# Patient Record
Sex: MALE | Race: WHITE | NOT HISPANIC OR LATINO | Employment: OTHER | URBAN - NONMETROPOLITAN AREA
[De-identification: names, ages, dates, MRNs, and addresses within clinical notes are randomized per-mention and may not be internally consistent; named-entity substitution may affect disease eponyms.]

---

## 2023-03-16 DIAGNOSIS — R91.8 MULTIPLE PULMONARY NODULES DETERMINED BY COMPUTED TOMOGRAPHY OF LUNG: Primary | ICD-10-CM

## 2023-03-16 DIAGNOSIS — J44.0 CHRONIC OBSTRUCTIVE PULMONARY DISEASE WITH ACUTE LOWER RESPIRATORY INFECTION (MULTI): Primary | ICD-10-CM

## 2023-03-16 PROBLEM — U07.1 COVID-19 VIRUS INFECTION: Status: RESOLVED | Noted: 2023-03-16 | Resolved: 2023-03-16

## 2023-03-16 PROBLEM — M79.10 MUSCLE PAIN: Status: RESOLVED | Noted: 2023-03-16 | Resolved: 2023-03-16

## 2023-03-16 PROBLEM — I25.10 CORONARY ARTERY DISEASE: Status: ACTIVE | Noted: 2023-03-16

## 2023-03-16 PROBLEM — G45.9 TRANSIENT ISCHEMIC ATTACK (TIA): Status: ACTIVE | Noted: 2023-03-16

## 2023-03-16 PROBLEM — S51.012A LACERATION OF LEFT ELBOW: Status: RESOLVED | Noted: 2023-03-16 | Resolved: 2023-03-16

## 2023-03-16 PROBLEM — J44.9 CHRONIC OBSTRUCTIVE PULMONARY DISEASE (MULTI): Status: ACTIVE | Noted: 2023-03-16

## 2023-03-16 RX ORDER — EZETIMIBE 10 MG/1
1 TABLET ORAL DAILY
COMMUNITY
Start: 2021-12-22

## 2023-03-16 RX ORDER — NITROGLYCERIN 0.4 MG/1
0.4 TABLET SUBLINGUAL EVERY 5 MIN PRN
COMMUNITY
Start: 2013-04-12

## 2023-03-16 RX ORDER — PREDNISONE 20 MG/1
40 TABLET ORAL DAILY
Qty: 10 TABLET | Refills: 0 | Status: SHIPPED | OUTPATIENT
Start: 2023-03-16 | End: 2023-03-21

## 2023-03-16 RX ORDER — ASPIRIN 81 MG/1
81 TABLET ORAL DAILY
COMMUNITY
Start: 2013-09-28

## 2023-03-16 RX ORDER — DOXYCYCLINE 100 MG/1
100 CAPSULE ORAL 2 TIMES DAILY
Qty: 20 CAPSULE | Refills: 0 | Status: SHIPPED | OUTPATIENT
Start: 2023-03-16 | End: 2023-03-26

## 2023-04-02 PROBLEM — E11.9 DIABETES MELLITUS, TYPE 2 (MULTI): Status: ACTIVE | Noted: 2023-04-02

## 2023-04-02 PROBLEM — R09.89 ABDOMINAL BRUIT: Status: ACTIVE | Noted: 2023-04-02

## 2023-04-02 PROBLEM — Z86.711 HISTORY OF PULMONARY EMBOLISM: Status: ACTIVE | Noted: 2023-04-02

## 2023-04-02 PROBLEM — K29.70 GASTRITIS: Status: ACTIVE | Noted: 2023-04-02

## 2023-04-02 PROBLEM — J40 BRONCHITIS: Status: ACTIVE | Noted: 2023-04-02

## 2023-04-02 PROBLEM — K21.9 GERD (GASTROESOPHAGEAL REFLUX DISEASE): Status: ACTIVE | Noted: 2023-04-02

## 2023-04-02 PROBLEM — M25.522 LEFT ELBOW PAIN: Status: ACTIVE | Noted: 2023-04-02

## 2023-04-02 PROBLEM — M25.552 HIP PAIN, LEFT: Status: ACTIVE | Noted: 2023-04-02

## 2023-04-02 PROBLEM — M13.0 POLYARTHRITIS: Status: ACTIVE | Noted: 2023-04-02

## 2023-04-02 PROBLEM — K22.70 BARRETT ESOPHAGUS: Status: ACTIVE | Noted: 2023-04-02

## 2023-04-02 PROBLEM — F17.200 NICOTINE DEPENDENCE: Status: ACTIVE | Noted: 2023-04-02

## 2023-04-02 PROBLEM — N52.9 ERECTILE DYSFUNCTION: Status: ACTIVE | Noted: 2023-04-02

## 2023-04-02 PROBLEM — E55.9 VITAMIN D DEFICIENCY: Status: ACTIVE | Noted: 2023-04-02

## 2023-04-02 PROBLEM — I10 HYPERTENSION: Status: ACTIVE | Noted: 2023-04-02

## 2023-04-02 PROBLEM — E78.5 HYPERLIPIDEMIA: Status: ACTIVE | Noted: 2023-04-02

## 2023-04-02 PROBLEM — J30.9 ALLERGIC RHINITIS: Status: ACTIVE | Noted: 2023-04-02

## 2023-04-02 RX ORDER — METOPROLOL SUCCINATE 25 MG/1
1 TABLET, EXTENDED RELEASE ORAL DAILY
COMMUNITY
Start: 2018-05-07

## 2023-04-02 RX ORDER — ROSUVASTATIN CALCIUM 20 MG/1
1 TABLET, COATED ORAL DAILY
COMMUNITY
Start: 2023-02-09

## 2023-04-03 ENCOUNTER — OFFICE VISIT (OUTPATIENT)
Dept: PRIMARY CARE | Facility: CLINIC | Age: 63
End: 2023-04-03
Payer: COMMERCIAL

## 2023-04-03 VITALS
DIASTOLIC BLOOD PRESSURE: 86 MMHG | BODY MASS INDEX: 28.31 KG/M2 | WEIGHT: 180.4 LBS | SYSTOLIC BLOOD PRESSURE: 122 MMHG | OXYGEN SATURATION: 96 % | HEART RATE: 65 BPM | HEIGHT: 67 IN

## 2023-04-03 DIAGNOSIS — R05.9 COUGH IN ADULT: ICD-10-CM

## 2023-04-03 DIAGNOSIS — R10.30 LOWER ABDOMINAL PAIN: ICD-10-CM

## 2023-04-03 DIAGNOSIS — J44.9 CHRONIC OBSTRUCTIVE PULMONARY DISEASE, UNSPECIFIED COPD TYPE (MULTI): Primary | ICD-10-CM

## 2023-04-03 PROCEDURE — 3074F SYST BP LT 130 MM HG: CPT | Performed by: FAMILY MEDICINE

## 2023-04-03 PROCEDURE — 3079F DIAST BP 80-89 MM HG: CPT | Performed by: FAMILY MEDICINE

## 2023-04-03 PROCEDURE — 99214 OFFICE O/P EST MOD 30 MIN: CPT | Performed by: FAMILY MEDICINE

## 2023-04-03 RX ORDER — LEVOFLOXACIN 500 MG/1
500 TABLET, FILM COATED ORAL DAILY
Qty: 10 TABLET | Refills: 0 | Status: SHIPPED | OUTPATIENT
Start: 2023-04-03 | End: 2023-04-13

## 2023-04-03 ASSESSMENT — ENCOUNTER SYMPTOMS: DEPRESSION: 0

## 2023-04-03 NOTE — PROGRESS NOTES
"Subjective   Patient ID: Jatinder Keenan is a 62 y.o. male who presents for Cough (Still has productive cough,also c/o across lower abdomen ).    HPI   Still coughing , some out at times clear whitish   Doxy and prednisone  did not help  Former heavy smoker  Had recent CT low-dose lung cancer screening which did show hazy area right lower lobe  Denies pain lung area  No fever chills    He does however have pain in abdomen  Lower mid abd pain   1yr ago belt buckle injury to lower abdomen in this area  Lifting object belt buckle came up sideways and went right in the abdomen had significant pain afterwards  Bms same   Does at times have some discomfort with full bladder  Otherwise significant change has not occurred regarding urinary function    Review of Systems    Objective   /86 (BP Location: Left arm, Patient Position: Sitting, BP Cuff Size: Large adult)   Pulse 65   Ht 1.689 m (5' 6.5\")   Wt 81.8 kg (180 lb 6.4 oz)   SpO2 96%   BMI 28.68 kg/m²     Physical Exam:  General: alert, no apparent distress, good hygiene   HEAD:  Normocephalic, atraumatic    EARS:  EAC patent, TMs normal,   EYES:  sclera white, YOBANI, conjunctiva noninjected  NOSE: Nasal passages patent   MOUTH: Pharynx clear, tongue uvula midline, grade 3 airway  Neck:  supple, no masses, thyroid non enlarged non nodular, no cervical adenopathy,  Lungs:  no wheezing, no rales , no rhonchi, normal respiratory pattern, breath sounds are  diminished  Heart:  regular rate and  rhythm, no murmur, no ectopy, no S3 or S4, no carotid bruits  Abdomen:  soft NT,BS + ,  no organomegaly, no masses, no bruits, area of pain is just suprapubic approximately area of 12 cm across lower abdomen midline.  Bladder does not feel enlarged no obvious hernia in this area or periumbilical  Extremities:  no edema, no cyanosis, no clubbing,  2+ posterior tibialis pulse      Assessment/Plan   Problem List Items Addressed This Visit       Chronic obstructive " pulmonary disease (CMS/HCC) - Primary    Relevant Medications    levoFLOXacin (Levaquin) 500 mg tablet     Other Visit Diagnoses       Lower abdominal pain        Relevant Orders    POCT UA (nonautomated) manually resulted    Cough in adult            Patient informed if cough does not improve he should have the CT low-dose lung follow-up approximately 1 month rather than 3  Former heavy smoker abnormalities on low-dose scan  Abdominal pain does not improved patient have UA possible further evaluation may need to consider CT scan.

## 2024-06-22 ENCOUNTER — OFFICE VISIT (OUTPATIENT)
Dept: PRIMARY CARE | Facility: CLINIC | Age: 64
End: 2024-06-22
Payer: COMMERCIAL

## 2024-06-22 VITALS
SYSTOLIC BLOOD PRESSURE: 117 MMHG | HEART RATE: 60 BPM | DIASTOLIC BLOOD PRESSURE: 74 MMHG | OXYGEN SATURATION: 96 % | BODY MASS INDEX: 26.71 KG/M2 | TEMPERATURE: 98.3 F | WEIGHT: 168 LBS

## 2024-06-22 DIAGNOSIS — K59.09 OTHER CONSTIPATION: Primary | ICD-10-CM

## 2024-06-22 PROBLEM — R73.9 HYPERGLYCEMIA: Status: ACTIVE | Noted: 2024-06-22

## 2024-06-22 PROBLEM — Z98.61 HISTORY OF PERCUTANEOUS CORONARY INTERVENTION: Status: ACTIVE | Noted: 2023-02-09

## 2024-06-22 PROBLEM — L23.7 CONTACT DERMATITIS DUE TO POISON IVY: Status: RESOLVED | Noted: 2024-06-22 | Resolved: 2024-06-22

## 2024-06-22 PROBLEM — R79.89 ABNORMAL LIVER FUNCTION TESTS: Status: ACTIVE | Noted: 2024-06-22

## 2024-06-22 PROBLEM — G81.94 LEFT HEMIPARESIS (MULTI): Status: ACTIVE | Noted: 2024-06-22

## 2024-06-22 PROBLEM — I25.2 HISTORY OF MYOCARDIAL INFARCTION: Status: ACTIVE | Noted: 2024-06-22

## 2024-06-22 PROCEDURE — 99213 OFFICE O/P EST LOW 20 MIN: CPT | Performed by: FAMILY MEDICINE

## 2024-06-22 PROCEDURE — 3078F DIAST BP <80 MM HG: CPT | Performed by: FAMILY MEDICINE

## 2024-06-22 PROCEDURE — 3074F SYST BP LT 130 MM HG: CPT | Performed by: FAMILY MEDICINE

## 2024-06-22 RX ORDER — SYRING-NEEDL,DISP,INSUL,0.3 ML 29 G X1/2"
296 SYRINGE, EMPTY DISPOSABLE MISCELLANEOUS ONCE
Qty: 296 ML | Refills: 0 | Status: ON HOLD | OUTPATIENT
Start: 2024-06-22 | End: 2024-06-22

## 2024-06-22 ASSESSMENT — PATIENT HEALTH QUESTIONNAIRE - PHQ9
1. LITTLE INTEREST OR PLEASURE IN DOING THINGS: NOT AT ALL
2. FEELING DOWN, DEPRESSED OR HOPELESS: NOT AT ALL
SUM OF ALL RESPONSES TO PHQ9 QUESTIONS 1 AND 2: 0

## 2024-06-22 NOTE — PROGRESS NOTES
Subjective   Patient ID: Jatinder Keenan is a 63 y.o. male who presents for Constipation (Last Bowel movement 10-11 days ago).  HPI  Having constipation for the last 10-11 days when thinks got the Viral GE (he got this instead of diarrhea)- wife had it  Can only pass some gas  Feels pressure  Some chills, fever off and on  No hematochezia, melena  No vomiting  Slight nausea    Has not tried anything OTC  No changes in diet    Current Outpatient Medications:     aspirin 81 mg EC tablet, Take 1 tablet (81 mg) by mouth once daily., Disp: , Rfl:     ezetimibe (Zetia) 10 mg tablet, Take 1 tablet (10 mg) by mouth once daily., Disp: , Rfl:     metoprolol succinate XL (Toprol-XL) 25 mg 24 hr tablet, Take 1 tablet (25 mg) by mouth once daily., Disp: , Rfl:     nitroglycerin (Nitrostat) 0.4 mg SL tablet, Place 1 tablet (0.4 mg) under the tongue every 5 minutes if needed., Disp: , Rfl:     rosuvastatin (Crestor) 20 mg tablet, Take 1 tablet (20 mg) by mouth once daily., Disp: , Rfl:     apixaban (Eliquis) 5 mg tablet, Take 1 tablet (5 mg) by mouth twice a day., Disp: , Rfl:     magnesium citrate solution, Take 296 mL by mouth 1 time for 1 dose., Disp: 296 mL, Rfl: 0   Past Surgical History:   Procedure Laterality Date    COLONOSCOPY  03/29/2017    Complete Colonoscopy    CORONARY ANGIOPLASTY WITH STENT PLACEMENT  02/06/2018    Cath Stent Placement    ESOPHAGOGASTRODUODENOSCOPY  03/29/2017    Diagnostic Esophagogastroduodenoscopy    HERNIA REPAIR  06/03/2013    Hernia Repair    LUNG SURGERY  09/28/2013    Lung Surgery    MR HEAD ANGIO WO IV CONTRAST  4/30/2018    MR HEAD ANGIO WO IV CONTRAST 4/30/2018 [facility]    MR NECK ANGIO WO IV CONTRAST  4/30/2018    MR NECK ANGIO WO IV CONTRAST 4/30/2018 [facility]    OTHER SURGICAL HISTORY  06/03/2013    Spinal Diskectomy Lumbar      Past Medical History:   Diagnosis Date    Acute maxillary sinusitis, unspecified 04/03/2015    Acute maxillary sinusitis    Body  mass index (BMI) 37.0-37.9, adult 07/17/2019    BMI 37.0-37.9, adult    Chest pain on breathing 09/10/2015    Chest pain on respiration    Contact dermatitis due to poison ivy 06/22/2024    COVID-19 virus infection 03/16/2023    Laceration of left elbow 03/16/2023    Lumbago with sciatica, left side 07/19/2017    Acute left-sided low back pain with left-sided sciatica    Muscle pain 03/16/2023    Other specified abnormal findings of blood chemistry     Abnormal liver function test    Personal history of diseases of the skin and subcutaneous tissue 01/30/2014    History of folliculitis    Personal history of other diseases of the digestive system 03/29/2017    History of lower gastrointestinal bleeding    Personal history of other diseases of the digestive system 02/12/2018    History of gastrointestinal hemorrhage    Personal history of other specified conditions 01/30/2014    History of headache    Personal history of other specified conditions 11/15/2018    History of epigastric pain    Personal history of pulmonary embolism 06/20/2022    History of pulmonary embolism    Polymyalgia rheumatica (Multi) 11/15/2018    Polymyalgia    Weakness 05/07/2018    Left-sided weakness     Social History     Tobacco Use    Smoking status: Some Days     Current packs/day: 0.25     Average packs/day: 0.3 packs/day for 15.0 years (3.8 ttl pk-yrs)     Types: Cigarettes    Smokeless tobacco: Never      Family History   Problem Relation Name Age of Onset    Diabetes Mother      Heart failure Father        Review of Systems    Objective   /74   Pulse 60   Temp 36.8 °C (98.3 °F)   Wt 76.2 kg (168 lb)   SpO2 96%   BMI 26.71 kg/m²    Physical Exam  Vitals and nursing note reviewed.   Constitutional:       General: He is not in acute distress.     Appearance: Normal appearance. He is not ill-appearing.   HENT:      Head: Normocephalic and atraumatic.   Cardiovascular:      Rate and Rhythm: Normal rate and regular rhythm.       Pulses: Normal pulses.      Heart sounds: Normal heart sounds.   Pulmonary:      Effort: Pulmonary effort is normal.      Breath sounds: Normal breath sounds.   Abdominal:      General: Abdomen is flat. Bowel sounds are normal.      Palpations: Abdomen is soft. There is no mass.      Tenderness: There is abdominal tenderness. There is no right CVA tenderness, left CVA tenderness, guarding or rebound.       Skin:     Capillary Refill: Capillary refill takes less than 2 seconds.   Neurological:      General: No focal deficit present.      Mental Status: He is alert and oriented to person, place, and time.   Psychiatric:         Mood and Affect: Mood normal.         Behavior: Behavior normal.         Assessment/Plan   Problem List Items Addressed This Visit    None  Visit Diagnoses       Other constipation    -  Primary    Relevant Medications    magnesium citrate solution    Other Relevant Orders    XR abdomen 3+ views        Magnesium citrate  Abdominal X-ray if that bell snot work  Fluids/fiber      Patient understands and agrees with treatment plan    Raza Sharma DO

## 2024-06-23 ENCOUNTER — HOSPITAL ENCOUNTER (INPATIENT)
Facility: HOSPITAL | Age: 64
End: 2024-06-23
Attending: EMERGENCY MEDICINE | Admitting: INTERNAL MEDICINE
Payer: COMMERCIAL

## 2024-06-23 ENCOUNTER — APPOINTMENT (OUTPATIENT)
Dept: RADIOLOGY | Facility: HOSPITAL | Age: 64
DRG: 331 | End: 2024-06-23
Payer: COMMERCIAL

## 2024-06-23 DIAGNOSIS — K57.92 DIVERTICULITIS: ICD-10-CM

## 2024-06-23 DIAGNOSIS — K57.20 DIVERTICULITIS OF LARGE INTESTINE WITH ABSCESS WITHOUT BLEEDING: ICD-10-CM

## 2024-06-23 DIAGNOSIS — K57.20 DIVERTICULITIS OF COLON WITH PERFORATION: Primary | ICD-10-CM

## 2024-06-23 LAB
ALBUMIN SERPL BCP-MCNC: 3.6 G/DL (ref 3.4–5)
ALP SERPL-CCNC: 53 U/L (ref 33–136)
ALT SERPL W P-5'-P-CCNC: 20 U/L (ref 10–52)
ANION GAP SERPL CALC-SCNC: 23 MMOL/L (ref 10–20)
AST SERPL W P-5'-P-CCNC: 34 U/L (ref 9–39)
BASOPHILS # BLD AUTO: 0.06 X10*3/UL (ref 0–0.1)
BASOPHILS NFR BLD AUTO: 0.4 %
BILIRUB SERPL-MCNC: 0.5 MG/DL (ref 0–1.2)
BUN SERPL-MCNC: 14 MG/DL (ref 6–23)
CALCIUM SERPL-MCNC: 9 MG/DL (ref 8.6–10.3)
CHLORIDE SERPL-SCNC: 97 MMOL/L (ref 98–107)
CO2 SERPL-SCNC: 21 MMOL/L (ref 21–32)
CREAT SERPL-MCNC: 0.82 MG/DL (ref 0.5–1.3)
CRP SERPL-MCNC: 12.31 MG/DL
EGFRCR SERPLBLD CKD-EPI 2021: >90 ML/MIN/1.73M*2
EOSINOPHIL # BLD AUTO: 0.12 X10*3/UL (ref 0–0.7)
EOSINOPHIL NFR BLD AUTO: 0.7 %
ERYTHROCYTE [DISTWIDTH] IN BLOOD BY AUTOMATED COUNT: 11.5 % (ref 11.5–14.5)
GLUCOSE SERPL-MCNC: 114 MG/DL (ref 74–99)
HCT VFR BLD AUTO: 41.1 % (ref 41–52)
HGB BLD-MCNC: 14.1 G/DL (ref 13.5–17.5)
IMM GRANULOCYTES # BLD AUTO: 0.07 X10*3/UL (ref 0–0.7)
IMM GRANULOCYTES NFR BLD AUTO: 0.4 % (ref 0–0.9)
LACTATE SERPL-SCNC: 0.7 MMOL/L (ref 0.4–2)
LYMPHOCYTES # BLD AUTO: 2.58 X10*3/UL (ref 1.2–4.8)
LYMPHOCYTES NFR BLD AUTO: 15.2 %
MAGNESIUM SERPL-MCNC: 2.32 MG/DL (ref 1.6–2.4)
MCH RBC QN AUTO: 33.1 PG (ref 26–34)
MCHC RBC AUTO-ENTMCNC: 34.3 G/DL (ref 32–36)
MCV RBC AUTO: 97 FL (ref 80–100)
MONOCYTES # BLD AUTO: 1.45 X10*3/UL (ref 0.1–1)
MONOCYTES NFR BLD AUTO: 8.5 %
NEUTROPHILS # BLD AUTO: 12.72 X10*3/UL (ref 1.2–7.7)
NEUTROPHILS NFR BLD AUTO: 74.8 %
NRBC BLD-RTO: 0 /100 WBCS (ref 0–0)
PLATELET # BLD AUTO: 323 X10*3/UL (ref 150–450)
POTASSIUM SERPL-SCNC: 3.8 MMOL/L (ref 3.5–5.3)
POTASSIUM SERPL-SCNC: 6.2 MMOL/L (ref 3.5–5.3)
PROT SERPL-MCNC: 7.4 G/DL (ref 6.4–8.2)
RBC # BLD AUTO: 4.26 X10*6/UL (ref 4.5–5.9)
SODIUM SERPL-SCNC: 135 MMOL/L (ref 136–145)
WBC # BLD AUTO: 17 X10*3/UL (ref 4.4–11.3)

## 2024-06-23 PROCEDURE — 2500000004 HC RX 250 GENERAL PHARMACY W/ HCPCS (ALT 636 FOR OP/ED): Performed by: PHYSICIAN ASSISTANT

## 2024-06-23 PROCEDURE — 1200000002 HC GENERAL ROOM WITH TELEMETRY DAILY

## 2024-06-23 PROCEDURE — 99285 EMERGENCY DEPT VISIT HI MDM: CPT | Mod: 25

## 2024-06-23 PROCEDURE — 96375 TX/PRO/DX INJ NEW DRUG ADDON: CPT

## 2024-06-23 PROCEDURE — 2500000004 HC RX 250 GENERAL PHARMACY W/ HCPCS (ALT 636 FOR OP/ED)

## 2024-06-23 PROCEDURE — 36415 COLL VENOUS BLD VENIPUNCTURE: CPT | Performed by: PHYSICIAN ASSISTANT

## 2024-06-23 PROCEDURE — 2500000004 HC RX 250 GENERAL PHARMACY W/ HCPCS (ALT 636 FOR OP/ED): Performed by: EMERGENCY MEDICINE

## 2024-06-23 PROCEDURE — 96374 THER/PROPH/DIAG INJ IV PUSH: CPT | Mod: 59

## 2024-06-23 PROCEDURE — 80053 COMPREHEN METABOLIC PANEL: CPT | Performed by: PHYSICIAN ASSISTANT

## 2024-06-23 PROCEDURE — 96361 HYDRATE IV INFUSION ADD-ON: CPT

## 2024-06-23 PROCEDURE — 83605 ASSAY OF LACTIC ACID: CPT | Performed by: PHYSICIAN ASSISTANT

## 2024-06-23 PROCEDURE — 83735 ASSAY OF MAGNESIUM: CPT | Performed by: PHYSICIAN ASSISTANT

## 2024-06-23 PROCEDURE — 86140 C-REACTIVE PROTEIN: CPT

## 2024-06-23 PROCEDURE — 2550000001 HC RX 255 CONTRASTS: Performed by: PHYSICIAN ASSISTANT

## 2024-06-23 PROCEDURE — 74177 CT ABD & PELVIS W/CONTRAST: CPT

## 2024-06-23 PROCEDURE — 85025 COMPLETE CBC W/AUTO DIFF WBC: CPT | Performed by: PHYSICIAN ASSISTANT

## 2024-06-23 PROCEDURE — 74177 CT ABD & PELVIS W/CONTRAST: CPT | Performed by: RADIOLOGY

## 2024-06-23 PROCEDURE — 84132 ASSAY OF SERUM POTASSIUM: CPT | Performed by: PHYSICIAN ASSISTANT

## 2024-06-23 PROCEDURE — 99222 1ST HOSP IP/OBS MODERATE 55: CPT

## 2024-06-23 PROCEDURE — 96365 THER/PROPH/DIAG IV INF INIT: CPT

## 2024-06-23 PROCEDURE — 87040 BLOOD CULTURE FOR BACTERIA: CPT | Mod: GEALAB | Performed by: EMERGENCY MEDICINE

## 2024-06-23 RX ORDER — POLYETHYLENE GLYCOL 3350 17 G/17G
17 POWDER, FOR SOLUTION ORAL DAILY
Status: DISPENSED | OUTPATIENT
Start: 2024-06-24

## 2024-06-23 RX ORDER — EZETIMIBE 10 MG/1
10 TABLET ORAL DAILY
Status: DISPENSED | OUTPATIENT
Start: 2024-06-24

## 2024-06-23 RX ORDER — METRONIDAZOLE 500 MG/100ML
500 INJECTION, SOLUTION INTRAVENOUS ONCE
Status: COMPLETED | OUTPATIENT
Start: 2024-06-23 | End: 2024-06-23

## 2024-06-23 RX ORDER — SODIUM CHLORIDE 9 MG/ML
125 INJECTION, SOLUTION INTRAVENOUS CONTINUOUS
Status: DISCONTINUED | OUTPATIENT
Start: 2024-06-23 | End: 2024-06-23

## 2024-06-23 RX ORDER — MORPHINE SULFATE 2 MG/ML
2 INJECTION, SOLUTION INTRAMUSCULAR; INTRAVENOUS EVERY 4 HOURS PRN
Status: DISCONTINUED | OUTPATIENT
Start: 2024-06-23 | End: 2024-06-28

## 2024-06-23 RX ORDER — SYRING-NEEDL,DISP,INSUL,0.3 ML 29 G X1/2"
296 SYRINGE, EMPTY DISPOSABLE MISCELLANEOUS ONCE
Qty: 296 ML | Refills: 0 | Status: ON HOLD | OUTPATIENT
Start: 2024-06-23 | End: 2024-06-23

## 2024-06-23 RX ORDER — NITROGLYCERIN 0.4 MG/1
0.4 TABLET SUBLINGUAL EVERY 5 MIN PRN
Status: ACTIVE | OUTPATIENT
Start: 2024-06-23

## 2024-06-23 RX ORDER — ROSUVASTATIN CALCIUM 20 MG/1
20 TABLET, COATED ORAL DAILY
Status: DISPENSED | OUTPATIENT
Start: 2024-06-24

## 2024-06-23 RX ORDER — MORPHINE SULFATE 4 MG/ML
4 INJECTION INTRAVENOUS ONCE
Status: COMPLETED | OUTPATIENT
Start: 2024-06-23 | End: 2024-06-23

## 2024-06-23 RX ORDER — ONDANSETRON 4 MG/1
4 TABLET, FILM COATED ORAL EVERY 8 HOURS PRN
Status: DISPENSED | OUTPATIENT
Start: 2024-06-23

## 2024-06-23 RX ORDER — ONDANSETRON HYDROCHLORIDE 2 MG/ML
4 INJECTION, SOLUTION INTRAVENOUS EVERY 8 HOURS PRN
Status: DISPENSED | OUTPATIENT
Start: 2024-06-23

## 2024-06-23 RX ORDER — SODIUM CHLORIDE, SODIUM LACTATE, POTASSIUM CHLORIDE, CALCIUM CHLORIDE 600; 310; 30; 20 MG/100ML; MG/100ML; MG/100ML; MG/100ML
75 INJECTION, SOLUTION INTRAVENOUS CONTINUOUS
Status: DISCONTINUED | OUTPATIENT
Start: 2024-06-23 | End: 2024-06-25

## 2024-06-23 RX ORDER — CIPROFLOXACIN 2 MG/ML
400 INJECTION, SOLUTION INTRAVENOUS ONCE
Status: COMPLETED | OUTPATIENT
Start: 2024-06-23 | End: 2024-06-23

## 2024-06-23 RX ORDER — MORPHINE SULFATE 4 MG/ML
4 INJECTION INTRAVENOUS EVERY 4 HOURS PRN
Status: DISCONTINUED | OUTPATIENT
Start: 2024-06-23 | End: 2024-06-28

## 2024-06-23 RX ORDER — CEFEPIME 1 G/50ML
2 INJECTION, SOLUTION INTRAVENOUS EVERY 8 HOURS
Status: DISPENSED | OUTPATIENT
Start: 2024-06-23

## 2024-06-23 RX ORDER — METOPROLOL SUCCINATE 25 MG/1
25 TABLET, EXTENDED RELEASE ORAL DAILY
Status: DISPENSED | OUTPATIENT
Start: 2024-06-24

## 2024-06-23 RX ORDER — ASPIRIN 81 MG/1
81 TABLET ORAL DAILY
Status: DISPENSED | OUTPATIENT
Start: 2024-06-24

## 2024-06-23 RX ORDER — KETOROLAC TROMETHAMINE 15 MG/ML
15 INJECTION, SOLUTION INTRAMUSCULAR; INTRAVENOUS EVERY 6 HOURS PRN
Status: DISCONTINUED | OUTPATIENT
Start: 2024-06-23 | End: 2024-06-24

## 2024-06-23 RX ORDER — HYDROMORPHONE HYDROCHLORIDE 1 MG/ML
1 INJECTION, SOLUTION INTRAMUSCULAR; INTRAVENOUS; SUBCUTANEOUS ONCE
Status: COMPLETED | OUTPATIENT
Start: 2024-06-23 | End: 2024-06-23

## 2024-06-23 RX ORDER — METRONIDAZOLE 500 MG/100ML
500 INJECTION, SOLUTION INTRAVENOUS EVERY 8 HOURS
Status: DISPENSED | OUTPATIENT
Start: 2024-06-23

## 2024-06-23 RX ORDER — ONDANSETRON HYDROCHLORIDE 2 MG/ML
4 INJECTION, SOLUTION INTRAVENOUS ONCE
Status: COMPLETED | OUTPATIENT
Start: 2024-06-23 | End: 2024-06-23

## 2024-06-23 RX ORDER — KETOROLAC TROMETHAMINE 15 MG/ML
15 INJECTION, SOLUTION INTRAMUSCULAR; INTRAVENOUS ONCE
Status: COMPLETED | OUTPATIENT
Start: 2024-06-23 | End: 2024-06-23

## 2024-06-23 RX ORDER — HEPARIN SODIUM 5000 [USP'U]/ML
5000 INJECTION, SOLUTION INTRAVENOUS; SUBCUTANEOUS EVERY 8 HOURS
Status: DISCONTINUED | OUTPATIENT
Start: 2024-06-23 | End: 2024-06-28

## 2024-06-23 RX ORDER — KETOROLAC TROMETHAMINE 15 MG/ML
INJECTION, SOLUTION INTRAMUSCULAR; INTRAVENOUS
Status: COMPLETED
Start: 2024-06-23 | End: 2024-06-23

## 2024-06-23 RX ADMIN — KETOROLAC TROMETHAMINE 15 MG: 15 INJECTION, SOLUTION INTRAMUSCULAR; INTRAVENOUS at 22:55

## 2024-06-23 RX ADMIN — CEFEPIME 2 G: 1 INJECTION, SOLUTION INTRAVENOUS at 23:35

## 2024-06-23 RX ADMIN — HYDROMORPHONE HYDROCHLORIDE 1 MG: 1 INJECTION, SOLUTION INTRAMUSCULAR; INTRAVENOUS; SUBCUTANEOUS at 20:55

## 2024-06-23 RX ADMIN — ONDANSETRON 4 MG: 2 INJECTION INTRAMUSCULAR; INTRAVENOUS at 15:54

## 2024-06-23 RX ADMIN — KETOROLAC TROMETHAMINE 15 MG: 15 INJECTION, SOLUTION INTRAMUSCULAR; INTRAVENOUS at 15:52

## 2024-06-23 RX ADMIN — MORPHINE SULFATE 4 MG: 4 INJECTION INTRAVENOUS at 23:56

## 2024-06-23 RX ADMIN — SODIUM CHLORIDE 1000 ML: 9 INJECTION, SOLUTION INTRAVENOUS at 15:57

## 2024-06-23 RX ADMIN — IOHEXOL 75 ML: 350 INJECTION, SOLUTION INTRAVENOUS at 18:23

## 2024-06-23 RX ADMIN — SODIUM CHLORIDE, POTASSIUM CHLORIDE, SODIUM LACTATE AND CALCIUM CHLORIDE 125 ML/HR: 600; 310; 30; 20 INJECTION, SOLUTION INTRAVENOUS at 22:54

## 2024-06-23 RX ADMIN — CIPROFLOXACIN 400 MG: 2 INJECTION, SOLUTION INTRAVENOUS at 20:56

## 2024-06-23 RX ADMIN — HEPARIN SODIUM 5000 UNITS: 5000 INJECTION INTRAVENOUS; SUBCUTANEOUS at 22:55

## 2024-06-23 RX ADMIN — MORPHINE SULFATE 4 MG: 4 INJECTION INTRAVENOUS at 19:01

## 2024-06-23 RX ADMIN — METRONIDAZOLE 500 MG: 500 INJECTION, SOLUTION INTRAVENOUS at 21:59

## 2024-06-23 SDOH — SOCIAL STABILITY: SOCIAL INSECURITY: ABUSE: ADULT

## 2024-06-23 SDOH — SOCIAL STABILITY: SOCIAL INSECURITY: ARE THERE ANY APPARENT SIGNS OF INJURIES/BEHAVIORS THAT COULD BE RELATED TO ABUSE/NEGLECT?: NO

## 2024-06-23 SDOH — SOCIAL STABILITY: SOCIAL INSECURITY: HAS ANYONE EVER THREATENED TO HURT YOUR FAMILY OR YOUR PETS?: NO

## 2024-06-23 SDOH — SOCIAL STABILITY: SOCIAL INSECURITY: ARE YOU OR HAVE YOU BEEN THREATENED OR ABUSED PHYSICALLY, EMOTIONALLY, OR SEXUALLY BY ANYONE?: NO

## 2024-06-23 SDOH — SOCIAL STABILITY: SOCIAL INSECURITY: HAVE YOU HAD THOUGHTS OF HARMING ANYONE ELSE?: NO

## 2024-06-23 SDOH — SOCIAL STABILITY: SOCIAL INSECURITY: DOES ANYONE TRY TO KEEP YOU FROM HAVING/CONTACTING OTHER FRIENDS OR DOING THINGS OUTSIDE YOUR HOME?: NO

## 2024-06-23 SDOH — SOCIAL STABILITY: SOCIAL INSECURITY: DO YOU FEEL UNSAFE GOING BACK TO THE PLACE WHERE YOU ARE LIVING?: NO

## 2024-06-23 SDOH — SOCIAL STABILITY: SOCIAL INSECURITY: DO YOU FEEL ANYONE HAS EXPLOITED OR TAKEN ADVANTAGE OF YOU FINANCIALLY OR OF YOUR PERSONAL PROPERTY?: NO

## 2024-06-23 SDOH — SOCIAL STABILITY: SOCIAL INSECURITY: HAVE YOU HAD ANY THOUGHTS OF HARMING ANYONE ELSE?: NO

## 2024-06-23 ASSESSMENT — ACTIVITIES OF DAILY LIVING (ADL)
GROOMING: DEPENDENT
WALKS IN HOME: DEPENDENT
JUDGMENT_ADEQUATE_SAFELY_COMPLETE_DAILY_ACTIVITIES: YES
DRESSING YOURSELF: DEPENDENT
FEEDING YOURSELF: DEPENDENT
BATHING: DEPENDENT
PATIENT'S MEMORY ADEQUATE TO SAFELY COMPLETE DAILY ACTIVITIES?: YES
TOILETING: DEPENDENT
HEARING - RIGHT EAR: FUNCTIONAL
ADEQUATE_TO_COMPLETE_ADL: YES
LACK_OF_TRANSPORTATION: NO
HEARING - LEFT EAR: FUNCTIONAL

## 2024-06-23 ASSESSMENT — PAIN SCALES - GENERAL
PAINLEVEL_OUTOF10: 10 - WORST POSSIBLE PAIN
PAINLEVEL_OUTOF10: 10 - WORST POSSIBLE PAIN
PAINLEVEL_OUTOF10: 7
PAINLEVEL_OUTOF10: 10 - WORST POSSIBLE PAIN

## 2024-06-23 ASSESSMENT — LIFESTYLE VARIABLES
HOW OFTEN DO YOU HAVE 6 OR MORE DRINKS ON ONE OCCASION: LESS THAN MONTHLY
EVER FELT BAD OR GUILTY ABOUT YOUR DRINKING: NO
AUDIT-C TOTAL SCORE: 2
EVER HAD A DRINK FIRST THING IN THE MORNING TO STEADY YOUR NERVES TO GET RID OF A HANGOVER: NO
HOW OFTEN DO YOU HAVE A DRINK CONTAINING ALCOHOL: MONTHLY OR LESS
HAVE PEOPLE ANNOYED YOU BY CRITICIZING YOUR DRINKING: NO
TOTAL SCORE: 0
HAVE YOU EVER FELT YOU SHOULD CUT DOWN ON YOUR DRINKING: NO
HOW MANY STANDARD DRINKS CONTAINING ALCOHOL DO YOU HAVE ON A TYPICAL DAY: 1 OR 2
SKIP TO QUESTIONS 9-10: 0
AUDIT-C TOTAL SCORE: 2

## 2024-06-23 ASSESSMENT — PAIN DESCRIPTION - FREQUENCY: FREQUENCY: CONSTANT/CONTINUOUS

## 2024-06-23 ASSESSMENT — COGNITIVE AND FUNCTIONAL STATUS - GENERAL
DAILY ACTIVITIY SCORE: 24
PATIENT BASELINE BEDBOUND: NO
MOBILITY SCORE: 24

## 2024-06-23 ASSESSMENT — PAIN DESCRIPTION - LOCATION
LOCATION: ABDOMEN

## 2024-06-23 ASSESSMENT — PATIENT HEALTH QUESTIONNAIRE - PHQ9
2. FEELING DOWN, DEPRESSED OR HOPELESS: NOT AT ALL
1. LITTLE INTEREST OR PLEASURE IN DOING THINGS: NOT AT ALL
SUM OF ALL RESPONSES TO PHQ9 QUESTIONS 1 & 2: 0

## 2024-06-23 ASSESSMENT — PAIN DESCRIPTION - PAIN TYPE: TYPE: ACUTE PAIN

## 2024-06-23 ASSESSMENT — PAIN DESCRIPTION - ONSET: ONSET: ONGOING

## 2024-06-23 ASSESSMENT — PAIN - FUNCTIONAL ASSESSMENT
PAIN_FUNCTIONAL_ASSESSMENT: 0-10
PAIN_FUNCTIONAL_ASSESSMENT: 0-10

## 2024-06-23 ASSESSMENT — PAIN DESCRIPTION - PROGRESSION: CLINICAL_PROGRESSION: NOT CHANGED

## 2024-06-23 NOTE — ED NOTES
Pt came into the ED today due to he was told to from his PCP.  He saw his PCP yesterday and has been constipated for the last two weeks.  Took mag-citrate yesterday and it did not move him  He states he has been burping more.  He states he has nausea denies vomiting he states his ABD pain is 10/10 Wife is bedside     Abbie Desouza RN  06/23/24 [id number]

## 2024-06-23 NOTE — ED PROVIDER NOTES
HPI   Chief Complaint   Patient presents with    Constipation     No BM X 2 weeks       Is a 63-year-old male coming in for abdominal discomfort.  He states that he has been constipated and has not had a bowel movement in 2 weeks.  Patient saw his PCP yesterday and was given magnesium citrate and was advised to get x-rays.  He is coming today because he called them again stating no bowel movement and worsening pain.  Patient's PCP advised him to come in.  Patient states he is no longer having flatulence.  He has had nausea but denies vomiting.  Patient denies any fevers.  He denies any urinary symptoms.      History provided by:  Patient and spouse                      Celine Coma Scale Score: 15                     Patient History   Past Medical History:   Diagnosis Date    Acute maxillary sinusitis, unspecified 04/03/2015    Acute maxillary sinusitis    Body mass index (BMI) 37.0-37.9, adult 07/17/2019    BMI 37.0-37.9, adult    Chest pain on breathing 09/10/2015    Chest pain on respiration    Contact dermatitis due to poison ivy 06/22/2024    COVID-19 virus infection 03/16/2023    Laceration of left elbow 03/16/2023    Lumbago with sciatica, left side 07/19/2017    Acute left-sided low back pain with left-sided sciatica    Muscle pain 03/16/2023    Other specified abnormal findings of blood chemistry     Abnormal liver function test    Personal history of diseases of the skin and subcutaneous tissue 01/30/2014    History of folliculitis    Personal history of other diseases of the digestive system 03/29/2017    History of lower gastrointestinal bleeding    Personal history of other diseases of the digestive system 02/12/2018    History of gastrointestinal hemorrhage    Personal history of other specified conditions 01/30/2014    History of headache    Personal history of other specified conditions 11/15/2018    History of epigastric pain    Personal history of pulmonary embolism 06/20/2022    History of  pulmonary embolism    Polymyalgia rheumatica (Multi) 11/15/2018    Polymyalgia    Weakness 05/07/2018    Left-sided weakness     Past Surgical History:   Procedure Laterality Date    COLONOSCOPY  03/29/2017    Complete Colonoscopy    CORONARY ANGIOPLASTY WITH STENT PLACEMENT  02/06/2018    Cath Stent Placement    ESOPHAGOGASTRODUODENOSCOPY  03/29/2017    Diagnostic Esophagogastroduodenoscopy    HERNIA REPAIR  06/03/2013    Hernia Repair    LUNG SURGERY  09/28/2013    Lung Surgery    MR HEAD ANGIO WO IV CONTRAST  4/30/2018    MR HEAD ANGIO WO IV CONTRAST 4/30/2018 [facility]    MR NECK ANGIO WO IV CONTRAST  4/30/2018    MR NECK ANGIO WO IV CONTRAST 4/30/2018 [facility]    OTHER SURGICAL HISTORY  06/03/2013    Spinal Diskectomy Lumbar     Family History   Problem Relation Name Age of Onset    Diabetes Mother      Heart failure Father       Social History     Tobacco Use    Smoking status: Some Days     Current packs/day: 0.25     Average packs/day: 0.3 packs/day for 15.0 years (3.8 ttl pk-yrs)     Types: Cigarettes    Smokeless tobacco: Never   Substance Use Topics    Alcohol use: Not on file    Drug use: Not on file       Physical Exam   ED Triage Vitals   Temperature Heart Rate Respirations BP   06/23/24 1532 06/23/24 1532 06/23/24 1532 06/23/24 1530   36.6 °C (97.9 °F) 84 18 129/78      Pulse Ox Temp Source Heart Rate Source Patient Position   06/23/24 1532 06/23/24 1532 -- 06/23/24 1532   97 % Temporal  Sitting      BP Location FiO2 (%)     06/23/24 1532 --     Left arm        Physical Exam  Vitals and nursing note reviewed.   Constitutional:       General: He is not in acute distress.     Appearance: Normal appearance. He is not toxic-appearing.   HENT:      Head: Normocephalic and atraumatic.      Nose: Nose normal.      Mouth/Throat:      Mouth: Mucous membranes are moist.      Pharynx: Oropharynx is clear.   Eyes:      Extraocular Movements: Extraocular movements intact.       Conjunctiva/sclera: Conjunctivae normal.      Pupils: Pupils are equal, round, and reactive to light.   Cardiovascular:      Rate and Rhythm: Normal rate and regular rhythm.      Pulses: Normal pulses.      Heart sounds: Normal heart sounds.   Pulmonary:      Effort: Pulmonary effort is normal. No respiratory distress.      Breath sounds: Normal breath sounds.   Abdominal:      General: Abdomen is flat. Bowel sounds are normal.      Palpations: Abdomen is soft.      Tenderness: There is abdominal tenderness in the right lower quadrant, suprapubic area and left lower quadrant. There is no guarding or rebound.   Musculoskeletal:         General: Normal range of motion.      Cervical back: Normal range of motion and neck supple.   Skin:     General: Skin is warm and dry.      Coloration: Skin is not jaundiced or pale.      Findings: No bruising.   Neurological:      General: No focal deficit present.      Mental Status: He is alert and oriented to person, place, and time. Mental status is at baseline.   Psychiatric:         Mood and Affect: Mood normal.         Behavior: Behavior normal.         ED Course & MDM        Medical Decision Making  Summary:  Medical Decision Making:   Patient presented as described in HPI. Patient case including ROS, PE, and treatment and plan discussed with ED attending if attached as cosigner. Results from labs and or imaging included below if completed. Jatinder ESPERANZA Keenan  is a 63 y.o. coming in for Patient presents with:  Constipation: No BM X 2 weeks  .  Patient reports no bowel movement for 2 weeks.  Lab work is completed as well as CT scan.  CT scan is pending.  Lab work showed elevated potassium however hemolyzed.  Redraw will be completed.  Redraw was normal.  Patient was given IV hydration. Patient's white blood cell count was elevated.  CT scan is pending.  He is given IV morphine because his pain started to return.  CT scan was reviewed and does show an abnormality on my  interpretation.  Results are pending.          Labs Reviewed  CBC WITH AUTO DIFFERENTIAL - Abnormal     WBC                           17.0 (*)               nRBC                          0.0                    RBC                           4.26 (*)               Hemoglobin                    14.1                   Hematocrit                    41.1                   MCV                           97                     MCH                           33.1                   MCHC                          34.3                   RDW                           11.5                   Platelets                     323                    Neutrophils %                 74.8                   Immature Granulocytes %, Automated   0.4                    Lymphocytes %                 15.2                   Monocytes %                   8.5                    Eosinophils %                 0.7                    Basophils %                   0.4                    Neutrophils Absolute          12.72 (*)               Immature Granulocytes Absolute, Au*   0.07                   Lymphocytes Absolute          2.58                   Monocytes Absolute            1.45 (*)               Eosinophils Absolute          0.12                   Basophils Absolute            0.06                COMPREHENSIVE METABOLIC PANEL - Abnormal     Glucose                       114 (*)                Sodium                        135 (*)                Potassium                     6.2 (*)                Chloride                      97 (*)                 Bicarbonate                   21                     Anion Gap                     23 (*)                 Urea Nitrogen                 14                     Creatinine                    0.82                   eGFR                          >90                    Calcium                       9.0                    Albumin                       3.6                    Alkaline Phosphatase          53                      Total Protein                 7.4                    AST                           34                     Bilirubin, Total              0.5                    ALT                           20                  MAGNESIUM - Normal     Magnesium                     2.32                LACTATE - Normal     Lactate                       0.7                      Narrative: Venipuncture immediately after or during the administration of Metamizole may lead to falsely low results. Testing should be performed immediately                prior to Metamizole dosing.  POTASSIUM - Normal     Potassium                     3.8                 URINALYSIS WITH REFLEX CULTURE AND MICROSCOPIC       Narrative: The following orders were created for panel order Urinalysis with Reflex Culture and Microscopic.                Procedure                               Abnormality         Status                                   ---------                               -----------         ------                                   Urinalysis with Reflex C...[[id number]]                                                               Extra Urine Gray Tube[[id number]]                                                                                     Please view results for these tests on the individual orders.  URINALYSIS WITH REFLEX CULTURE AND MICROSCOPIC  EXTRA URINE GRAY TUBE   CT abdomen pelvis w IV contrast    (Results Pending)                         Tests/Medications/Escalations of Care considered but not given: As in MDM    Patient care discussed with: N/A  Social Determinants affecting care: N/A    Final diagnosis and disposition as documented              This note has been transcribed using voice recognition and may contain grammatical errors, misplaced words, incorrect words, incorrect phrases or other errors.         Procedure  Procedures     Ricardo Newsome PA-C  06/23/24 [id number]

## 2024-06-24 LAB
APTT PPP: 31 SECONDS (ref 27–38)
ERYTHROCYTE [DISTWIDTH] IN BLOOD BY AUTOMATED COUNT: 11.2 % (ref 11.5–14.5)
HCT VFR BLD AUTO: 39.1 % (ref 41–52)
HGB BLD-MCNC: 12.8 G/DL (ref 13.5–17.5)
INR PPP: 1.4 (ref 0.9–1.1)
LACTATE SERPL-SCNC: 0.8 MMOL/L (ref 0.4–2)
MAGNESIUM SERPL-MCNC: 2 MG/DL (ref 1.6–2.4)
MCH RBC QN AUTO: 32.6 PG (ref 26–34)
MCHC RBC AUTO-ENTMCNC: 32.7 G/DL (ref 32–36)
MCV RBC AUTO: 100 FL (ref 80–100)
NRBC BLD-RTO: 0 /100 WBCS (ref 0–0)
PLATELET # BLD AUTO: 279 X10*3/UL (ref 150–450)
PROTHROMBIN TIME: 15.6 SECONDS (ref 9.8–12.8)
RBC # BLD AUTO: 3.93 X10*6/UL (ref 4.5–5.9)
WBC # BLD AUTO: 13.8 X10*3/UL (ref 4.4–11.3)

## 2024-06-24 PROCEDURE — 85027 COMPLETE CBC AUTOMATED: CPT

## 2024-06-24 PROCEDURE — 0W9G30Z DRAINAGE OF PERITONEAL CAVITY WITH DRAINAGE DEVICE, PERCUTANEOUS APPROACH: ICD-10-PCS | Performed by: RADIOLOGY

## 2024-06-24 PROCEDURE — 83735 ASSAY OF MAGNESIUM: CPT

## 2024-06-24 PROCEDURE — 85610 PROTHROMBIN TIME: CPT

## 2024-06-24 PROCEDURE — 2500000004 HC RX 250 GENERAL PHARMACY W/ HCPCS (ALT 636 FOR OP/ED): Performed by: FAMILY MEDICINE

## 2024-06-24 PROCEDURE — 2500000001 HC RX 250 WO HCPCS SELF ADMINISTERED DRUGS (ALT 637 FOR MEDICARE OP): Performed by: SURGERY

## 2024-06-24 PROCEDURE — 36415 COLL VENOUS BLD VENIPUNCTURE: CPT

## 2024-06-24 PROCEDURE — 2500000002 HC RX 250 W HCPCS SELF ADMINISTERED DRUGS (ALT 637 FOR MEDICARE OP, ALT 636 FOR OP/ED)

## 2024-06-24 PROCEDURE — 99232 SBSQ HOSP IP/OBS MODERATE 35: CPT | Performed by: FAMILY MEDICINE

## 2024-06-24 PROCEDURE — 99222 1ST HOSP IP/OBS MODERATE 55: CPT | Performed by: SURGERY

## 2024-06-24 PROCEDURE — 1200000002 HC GENERAL ROOM WITH TELEMETRY DAILY

## 2024-06-24 PROCEDURE — 83605 ASSAY OF LACTIC ACID: CPT

## 2024-06-24 PROCEDURE — 2500000004 HC RX 250 GENERAL PHARMACY W/ HCPCS (ALT 636 FOR OP/ED): Mod: JZ

## 2024-06-24 PROCEDURE — 2500000001 HC RX 250 WO HCPCS SELF ADMINISTERED DRUGS (ALT 637 FOR MEDICARE OP)

## 2024-06-24 RX ORDER — BISACODYL 10 MG/1
10 SUPPOSITORY RECTAL DAILY
Status: DISCONTINUED | OUTPATIENT
Start: 2024-06-24 | End: 2024-06-29

## 2024-06-24 RX ADMIN — MORPHINE SULFATE 4 MG: 4 INJECTION INTRAVENOUS at 18:19

## 2024-06-24 RX ADMIN — BISACODYL 10 MG: 10 SUPPOSITORY RECTAL at 09:01

## 2024-06-24 RX ADMIN — ROSUVASTATIN CALCIUM 20 MG: 20 TABLET, FILM COATED ORAL at 08:49

## 2024-06-24 RX ADMIN — METRONIDAZOLE 500 MG: 500 INJECTION, SOLUTION INTRAVENOUS at 05:01

## 2024-06-24 RX ADMIN — METOPROLOL SUCCINATE 25 MG: 25 TABLET, EXTENDED RELEASE ORAL at 08:49

## 2024-06-24 RX ADMIN — HEPARIN SODIUM 5000 UNITS: 5000 INJECTION INTRAVENOUS; SUBCUTANEOUS at 15:50

## 2024-06-24 RX ADMIN — EZETIMIBE 10 MG: 10 TABLET ORAL at 08:49

## 2024-06-24 RX ADMIN — CEFEPIME 2 G: 1 INJECTION, SOLUTION INTRAVENOUS at 12:30

## 2024-06-24 RX ADMIN — HEPARIN SODIUM 5000 UNITS: 5000 INJECTION INTRAVENOUS; SUBCUTANEOUS at 06:35

## 2024-06-24 RX ADMIN — CEFEPIME 2 G: 1 INJECTION, SOLUTION INTRAVENOUS at 21:26

## 2024-06-24 RX ADMIN — SODIUM CHLORIDE, POTASSIUM CHLORIDE, SODIUM LACTATE AND CALCIUM CHLORIDE 75 ML/HR: 600; 310; 30; 20 INJECTION, SOLUTION INTRAVENOUS at 19:39

## 2024-06-24 RX ADMIN — MORPHINE SULFATE 4 MG: 4 INJECTION INTRAVENOUS at 05:01

## 2024-06-24 RX ADMIN — HEPARIN SODIUM 5000 UNITS: 5000 INJECTION INTRAVENOUS; SUBCUTANEOUS at 22:18

## 2024-06-24 RX ADMIN — CEFEPIME 2 G: 1 INJECTION, SOLUTION INTRAVENOUS at 06:29

## 2024-06-24 RX ADMIN — ASPIRIN 81 MG: 81 TABLET, COATED ORAL at 08:49

## 2024-06-24 RX ADMIN — MORPHINE SULFATE 2 MG: 2 INJECTION, SOLUTION INTRAMUSCULAR; INTRAVENOUS at 12:19

## 2024-06-24 RX ADMIN — METRONIDAZOLE 500 MG: 500 INJECTION, SOLUTION INTRAVENOUS at 15:46

## 2024-06-24 RX ADMIN — SODIUM CHLORIDE, POTASSIUM CHLORIDE, SODIUM LACTATE AND CALCIUM CHLORIDE 125 ML/HR: 600; 310; 30; 20 INJECTION, SOLUTION INTRAVENOUS at 08:47

## 2024-06-24 RX ADMIN — METRONIDAZOLE 500 MG: 500 INJECTION, SOLUTION INTRAVENOUS at 22:18

## 2024-06-24 ASSESSMENT — COGNITIVE AND FUNCTIONAL STATUS - GENERAL
DAILY ACTIVITIY SCORE: 24
DAILY ACTIVITIY SCORE: 24
MOBILITY SCORE: 24
MOBILITY SCORE: 24

## 2024-06-24 ASSESSMENT — PAIN - FUNCTIONAL ASSESSMENT
PAIN_FUNCTIONAL_ASSESSMENT: 0-10

## 2024-06-24 ASSESSMENT — PAIN SCALES - GENERAL
PAINLEVEL_OUTOF10: 6
PAINLEVEL_OUTOF10: 4
PAINLEVEL_OUTOF10: 6
PAINLEVEL_OUTOF10: 7
PAINLEVEL_OUTOF10: 3
PAINLEVEL_OUTOF10: 5 - MODERATE PAIN
PAINLEVEL_OUTOF10: 4
PAINLEVEL_OUTOF10: 7
PAINLEVEL_OUTOF10: 5 - MODERATE PAIN
PAINLEVEL_OUTOF10: 6

## 2024-06-24 ASSESSMENT — PAIN DESCRIPTION - LOCATION
LOCATION: ABDOMEN

## 2024-06-24 ASSESSMENT — ACTIVITIES OF DAILY LIVING (ADL): LACK_OF_TRANSPORTATION: NO

## 2024-06-24 NOTE — PROGRESS NOTES
06/24/24 0810   Discharge Planning   Living Arrangements Spouse/significant other   Support Systems Spouse/significant other   Assistance Needed A&OX3; independent with ADLs with no DME; drives; room air baseline and currently room air   Type of Residence Private residence   Number of Stairs to Enter Residence 2   Number of Stairs Within Residence 14   Do you have animals or pets at home? No   Who is requesting discharge planning? Provider   Patient expects to be discharged to: Patient denies any home going needs   Does the patient need discharge transport arranged? No   Financial Resource Strain   How hard is it for you to pay for the very basics like food, housing, medical care, and heating? Not hard   Housing Stability   In the last 12 months, was there a time when you were not able to pay the mortgage or rent on time? N   In the last 12 months, how many places have you lived? 1   In the last 12 months, was there a time when you did not have a steady place to sleep or slept in a shelter (including now)? N   Transportation Needs   In the past 12 months, has lack of transportation kept you from medical appointments or from getting medications? no   In the past 12 months, has lack of transportation kept you from meetings, work, or from getting things needed for daily living? No

## 2024-06-24 NOTE — CONSULTS
Reason For Consult  Diverticulitis    History Of Present Illness  Jatinder Keenan is a 63 y.o. male presenting with a several day history of abdominal pain.  The patient felt that he was likely constipated.  He states he had not moved his bowels for up to 12 days.  He went to see his physician and was given some mag citrate which she took at home.  That caused him significant cramping but still no bowel movement.  He was then advised to come into the emergency room.  He denies any fever or chills no nausea or vomiting..     Past Medical History  He has a past medical history of Acute maxillary sinusitis, unspecified (04/03/2015), Body mass index (BMI) 37.0-37.9, adult (07/17/2019), Chest pain on breathing (09/10/2015), Contact dermatitis due to poison ivy (06/22/2024), COVID-19 virus infection (03/16/2023), Laceration of left elbow (03/16/2023), Lumbago with sciatica, left side (07/19/2017), Muscle pain (03/16/2023), Other specified abnormal findings of blood chemistry, Personal history of diseases of the skin and subcutaneous tissue (01/30/2014), Personal history of other diseases of the digestive system (03/29/2017), Personal history of other diseases of the digestive system (02/12/2018), Personal history of other specified conditions (01/30/2014), Personal history of other specified conditions (11/15/2018), Personal history of pulmonary embolism (06/20/2022), Polymyalgia rheumatica (Multi) (11/15/2018), and Weakness (05/07/2018).    Surgical History  He has a past surgical history that includes Hernia repair (06/03/2013); Other surgical history (06/03/2013); Lung surgery (09/28/2013); Coronary angioplasty with stent (02/06/2018); Colonoscopy (03/29/2017); Esophagogastroduodenoscopy (03/29/2017); MR angio head wo IV contrast (4/30/2018); and MR angio neck wo IV contrast (4/30/2018).     Social History  He reports that he has been smoking cigarettes. He has a 3.8 pack-year smoking history. He has never used  "smokeless tobacco. No history on file for alcohol use and drug use.    Family History  Family History   Problem Relation Name Age of Onset    Diabetes Mother      Heart failure Father          Allergies  Atorvastatin    Review of Systems  10 point review is otherwise negative     Physical Exam  On exam the patient is sitting up in bed he appears comfortable.  His lungs are clear anteriorly.  Heart is regular rate and rhythm.  Abdomen is flat and soft and he has good bowel sounds.  He is tender all across the lower abdomen.  Remedies do not reveal any gross deformities.     Last Recorded Vitals  Blood pressure 143/81, pulse 79, temperature 37 °C (98.6 °F), temperature source Oral, resp. rate 18, height 1.702 m (5' 7\"), weight 74.8 kg (165 lb), SpO2 93%.    Relevant Results  CAT scan reveals significant sigmoid diverticulitis.  There is intense inflammatory change all through the pelvis due to the sigmoid.  Apparently there is also a small abscess in the wall of the sigmoid.     Assessment/Plan     White blood cell count is slightly improved today to 13.8.  Agree with attempts at medical management of his diverticulitis.  The intramural abscess cannot be drained most likely therefore we will have to rely on antibiotics and bowel rest.  Low threshold to repeat CT scan in the next 3 to 4 days.    I spent 20 minutes in the professional and overall care of this patient.      Pallavi Nunn MD    "

## 2024-06-24 NOTE — H&P
Patient: Jatinder Keenan   Age: 63 y.o.   Gender: male   Room[facility]Bed: [facility]     Attending: Carola Chakraborty*  Code Status:  Full Code    Chief Complaint:  Patient: Jatinder Keenan is a 63 y.o. male who presented to the hospital for constipation and abdominal pain evaluation.     History of Presenting Illness  Jatinder Keenan is a 63 y.o. male Ex smoker with a history of CAD s/p MI and PCI to RCA x 2 (2016), HTN, HLD, remote PE, and CVA (not on Eliquis)  who presented to the hospital for constipation and abdominal pain evaluation. Patient was in pain and was not able to provide further information, according to the ED he was constipated for 2 weeks, he visited his PCP and was prescribed Metamucil and Abd XR was taken, as the patient didn't have a BM today and his pain got worse he was advised by his PCP to present to the ED for further Evaluation.  He also endorses nausea, but denies vomiting or urinary symptoms.     In the ED he was afebrile, HDS, labs with leukocytosis, initially elevated Potassium mostly a lab error but the repeat was normal, elevated AG, normal lactate and normal EKG. CT AP w/IV Cont showed  acute diverticulitis and evidence of perforation and a 3.7 cm x 2.8 cm abscess in the wall of the sigmoid colon. And abdominal LAD He did receive 1L NS, 15mg Toradol, morphine, 1 mg dilaudid, Zofran, Cipro, flagyl, Dr Nunn was contacted and agreed to see the patient tomorrow morning.        Past Medical History   Past Medical History:   Diagnosis Date    Acute maxillary sinusitis, unspecified 04/03/2015    Acute maxillary sinusitis    Body mass index (BMI) 37.0-37.9, adult 07/17/2019    BMI 37.0-37.9, adult    Chest pain on breathing 09/10/2015    Chest pain on respiration    Contact dermatitis due to poison ivy 06/22/2024    COVID-19 virus infection 03/16/2023    Laceration of left elbow 03/16/2023    Lumbago with sciatica, left side 07/19/2017    Acute left-sided low back pain  with left-sided sciatica    Muscle pain 03/16/2023    Other specified abnormal findings of blood chemistry     Abnormal liver function test    Personal history of diseases of the skin and subcutaneous tissue 01/30/2014    History of folliculitis    Personal history of other diseases of the digestive system 03/29/2017    History of lower gastrointestinal bleeding    Personal history of other diseases of the digestive system 02/12/2018    History of gastrointestinal hemorrhage    Personal history of other specified conditions 01/30/2014    History of headache    Personal history of other specified conditions 11/15/2018    History of epigastric pain    Personal history of pulmonary embolism 06/20/2022    History of pulmonary embolism    Polymyalgia rheumatica (Multi) 11/15/2018    Polymyalgia    Weakness 05/07/2018    Left-sided weakness      Past Surgical History:   Past Surgical History:   Procedure Laterality Date    COLONOSCOPY  03/29/2017    Complete Colonoscopy    CORONARY ANGIOPLASTY WITH STENT PLACEMENT  02/06/2018    Cath Stent Placement    ESOPHAGOGASTRODUODENOSCOPY  03/29/2017    Diagnostic Esophagogastroduodenoscopy    HERNIA REPAIR  06/03/2013    Hernia Repair    LUNG SURGERY  09/28/2013    Lung Surgery    MR HEAD ANGIO WO IV CONTRAST  4/30/2018    MR HEAD ANGIO WO IV CONTRAST 4/30/2018 [facility]    MR NECK ANGIO WO IV CONTRAST  4/30/2018    MR NECK ANGIO WO IV CONTRAST 4/30/2018 [facility]    OTHER SURGICAL HISTORY  06/03/2013    Spinal Diskectomy Lumbar     Family History:   Family History   Problem Relation Name Age of Onset    Diabetes Mother      Heart failure Father       Social History:   Tobacco Use: High Risk (6/22/2024)    Patient History     Smoking Tobacco Use: Some Days     Smokeless Tobacco Use: Never     Passive Exposure: Not on file      Social History     Substance and Sexual Activity   Alcohol Use None       Outpatient Medications:  Current Outpatient Medications    Medication Instructions    apixaban (ELIQUIS) 5 mg, oral, 2 times daily    aspirin 81 mg, oral, Daily    ezetimibe (Zetia) 10 mg tablet 1 tablet, oral, Daily    magnesium citrate solution 296 mL, oral, Once    metoprolol succinate XL (Toprol-XL) 25 mg 24 hr tablet 1 tablet, oral, Daily    nitroglycerin (NITROSTAT) 0.4 mg, sublingual, Every 5 min PRN    rosuvastatin (Crestor) 20 mg tablet 1 tablet, oral, Daily        ED Course   Vitals - /73 (BP Location: Left arm, Patient Position: Lying)   Pulse 74   Temp 36.5 °C (97.7 °F) (Temporal)   Resp 17   Wt 74.8 kg (165 lb)   SpO2 94%     Labs:   CBC:  Recent Labs     06/23/24  1541 06/20/22  0930 02/23/21  1108   WBC 17.0* 7.8 9.0   HGB 14.1 14.6 16.5   HCT 41.1 44.1 47.8    230 255   MCV 97 101* 97     CMP:  Recent Labs     06/23/24  1744 06/23/24  1633 06/20/22  0930 06/16/21  0737   NA  --  135* 139 138   K 3.8 6.2* 4.4 4.6   CL  --  97* 105 103   CO2  --  21 29 27   ANIONGAP  --  23* 9* 13   BUN  --  14 16 16   CREATININE  --  0.82 0.98 0.84   EGFR  --  >90  --   --    GLUCOSE  --  114* 85 93     Recent Labs     06/23/24  1633 06/20/22  0930 06/16/21  0737 02/23/21  1108 07/17/19  1221 02/03/18  0547   ALBUMIN 3.6 4.2 4.3 4.3   < > 3.7   ALKPHOS 53  --   --  75  --  46   ALT 20  --   --  27  --  32   AST 34  --   --  14  --  20   BILITOT 0.5  --   --  0.7  --  0.6    < > = values in this interval not displayed.     Calcium/Phos:   Lab Results   Component Value Date    CALCIUM 9.0 06/23/2024    PHOS 3.1 06/20/2022      COAG:   Recent Labs     04/29/18 2030 02/02/18  1330   INR 1.1 1.0     CRP:   Lab Results   Component Value Date    CRP 12.31 (H) 06/23/2024      [unfilled]   ENDO:  Recent Labs     04/30/18  0622   HGBA1C 6.4      CARDIAC:   Recent Labs     04/30/18  0622   BNP 10     Recent Labs     06/20/22  0930 06/16/21  0737 07/17/19  1221   CHOL 82 99 93   LDLF 30 43 32   HDL 42.5 36.4* 30.1*   TRIG 46 96 153*     No data  "recorded    Micro/ID:   No results found for the last 90 days.                   No lab exists for component: \"AGALPCRNB\"   .ID  No results found for: \"URINECULTURE\", \"BLOODCULT\", \"CSFCULTSMEAR\"    Imaging:   No orders to display   No results found for this or any previous visit (from the past 4464 hour(s)).  No echocardiogram results found for the past 12 months  CT abdomen pelvis w IV contrast    Result Date: 6/23/2024  Interpreted By:  Flavio Miranda, STUDY: CT ABDOMEN PELVIS W IV CONTRAST;  6/23/2024 6:28 pm   INDICATION: Signs/Symptoms:Constipation for 2 weeks.  Pain to bilateral lower quadrants.   COMPARISON: None.   ACCESSION NUMBER(S): [id number]   ORDERING CLINICIAN: AYAAN SHEETS   TECHNIQUE: Contiguous axial images of the abdomen and pelvis were obtained after the intravenous administration of  contrast. Coronal and sagittal reformatted images were obtained from the axial images.   FINDINGS: There is limited evaluation of the lung bases. Mild basilar atelectasis. Coronary artery atherosclerotic calcifications.   2.8 cm cyst in the left hepatic lobe and several subcentimeter hepatic hypodensities which are too small to characterize. The gallbladder is present. No dilatation common bile duct.   The pancreas, spleen, and adrenal glands appear unremarkable.   Symmetric enhancement of the kidneys. No hydronephrosis.   No evidence of bowel obstruction or acute appendicitis. Fluid-filled segments of colon. There is colonic diverticulosis with prominent wall thickening and edema of the surrounding the sigmoid colon with findings compatible with colonic perforation and there is a 3.7 x 2.8 cm abscess involving the wall of the sigmoid colon. There prominent lymph nodes superior to sigmoid colon which measure up to 1.6 cm.   Urinary bladder is underdistended and not well evaluated.   Multilevel degenerative change of the lumbar spine.       Colonic diverticulosis with prominent wall thickening and edema of the " sigmoid colon compatible with acute diverticulitis and evidence of component of perforation with prominent surrounding edema and a 3.7 cm x 2.8 cm abscess in the wall of the sigmoid colon. Prominent and enlarged lymph nodes superior to the sigmoid colon measure up to 1.6 cm.   Follow-up colonoscopy is recommended after treatment to exclude other underlying pathology including mass/malignancy.   MACRO: None   Signed by: Flavio Miranda 6/23/2024 7:35 PM Dictation workstation:   [id number]     Interventions:  Medications   metroNIDAZOLE (Flagyl) 500 mg in NaCl (iso-os) 100 mL (500 mg intravenous Continue to Inpatient Floor 6/23/24 2258)   polyethylene glycol (Glycolax, Miralax) packet 17 g ( oral Dose Auto Held 6/28/24 0900)   cefepime (Maxipime) IV 2 g (has no administration in time range)   metroNIDAZOLE (Flagyl) 500 mg in NaCl (iso-os) 100 mL (has no administration in time range)   sodium chloride 0.9% infusion (has no administration in time range)   morphine injection 2 mg (has no administration in time range)   morphine injection 4 mg (has no administration in time range)   ketorolac (Toradol) injection 15 mg (has no administration in time range)   sodium chloride 0.9 % bolus 1,000 mL (0 mL intravenous Stopped 6/23/24 1657)   ketorolac (Toradol) injection 15 mg (15 mg intravenous Given 6/23/24 1552)   ondansetron (Zofran) injection 4 mg (4 mg intravenous Given 6/23/24 1554)   iohexol (OMNIPaque) 350 mg iodine/mL solution 75 mL (75 mL intravenous Given 6/23/24 1823)   morphine injection 4 mg (4 mg intravenous Given 6/23/24 1901)   ciprofloxacin (Cipro) IVPB 400 mg (0 mg intravenous Stopped 6/23/24 2156)   HYDROmorphone (Dilaudid) injection 1 mg (1 mg intravenous Given 6/23/24 2055)       Objective Data  Physical Exam  Vitals and nursing note reviewed.   Constitutional:       Appearance: Normal appearance. He is normal weight.   HENT:      Head: Normocephalic and atraumatic.      Mouth/Throat:      Mouth: Mucous  membranes are dry.      Pharynx: Oropharynx is clear.   Eyes:      Extraocular Movements: Extraocular movements intact.      Conjunctiva/sclera: Conjunctivae normal.      Pupils: Pupils are equal, round, and reactive to light.   Cardiovascular:      Rate and Rhythm: Normal rate and regular rhythm.   Pulmonary:      Effort: Pulmonary effort is normal.      Breath sounds: Normal breath sounds.   Abdominal:      General: Abdomen is flat. Bowel sounds are normal.      Palpations: Abdomen is soft.      Tenderness: There is abdominal tenderness.   Musculoskeletal:         General: Normal range of motion.      Cervical back: Normal range of motion and neck supple.   Skin:     General: Skin is warm and dry.      Capillary Refill: Capillary refill takes less than 2 seconds.   Neurological:      General: No focal deficit present.      Mental Status: He is alert and oriented to person, place, and time. Mental status is at baseline.   Psychiatric:         Mood and Affect: Mood normal.         Behavior: Behavior normal.         Thought Content: Thought content normal.         Judgment: Judgment normal.                Assessment and Plan   Jatinder Keenan is a 63 y.o. male Ex smoker with a history of CAD s/p MI and PCI to RCA x 2 (2016), HTN, HLD, remote PE, and CVA (not on Eliquis)  who presented to the hospital for constipation and abdominal pain evaluation. In the ED he was afebrile, HDS, labs with leukocytosis, initially elevated Potassium mostly a lab error but the repeat was normal, elevated AG, normal lactate and normal EKG. CT AP w/IV Cont showed  acute diverticulitis and evidence of perforation and a 3.7 cm x 2.8 cm abscess in the wall of the sigmoid colon. And abdominal LAD He did receive 1L NS, 15mg Toradol, morphine, 1 mg dilaudid, Zofran, Cipro, flagyl, Dr Nunn was contacted and agreed to see the patient tomorrow morning.      Acute Medical Issues:  #abdominal pain   #Acute Diverticulitis with perforation    #sigmoid abscess  #abdominal LAD    Keep NPO, IVF @125ml/Hr   F/up Bcx   Start on Cefepime and Flagyl   Monitor electrolytes, RFP, lactate    Surgery Onboard, ID consulted   Pain: Morphine/Toradol and nausea control   Louisa require colonoscopy after resolution and GI evaluation     Chronic Medical Issues:   HLD: Crestor 20mg, Zetia 10mg   HTN/CAD: Metoprolol 25 mg, Aspirin 81mg   Hx of PE/DVT: remote patient not on meds now     DVT Prophylaxis: SOLANGE/SCD      Dispo: patient admitted for acute complicated diverticulitis will require surgical evaluation, ELOS> 48hrs

## 2024-06-24 NOTE — PROGRESS NOTES
Jatinder Keenan is a 63 y.o. male on day 1 of admission presenting with Diverticulitis of colon with perforation.      Subjective   Reports he has continued pain however this is improved from admission       Objective     Last Recorded Vitals  /81 (BP Location: Left arm, Patient Position: Lying)   Pulse 79   Temp 37 °C (98.6 °F) (Oral)   Resp 18   Wt 74.8 kg (165 lb)   SpO2 93%   Intake/Output last 3 Shifts:  No intake or output data in the 24 hours ending 06/24/24 0728    Admission Weight  Weight: 74.8 kg (165 lb) (06/23/24 1532)    Daily Weight  06/23/24 : 74.8 kg (165 lb)    Image Results  CT abdomen pelvis w IV contrast  Narrative: Interpreted By:  Flavio Miranda,   STUDY:  CT ABDOMEN PELVIS W IV CONTRAST;  6/23/2024 6:28 pm      INDICATION:  Signs/Symptoms:Constipation for 2 weeks.  Pain to bilateral lower  quadrants.      COMPARISON:  None.      ACCESSION NUMBER(S):  [id number]      ORDERING CLINICIAN:  AYAAN SHEETS      TECHNIQUE:  Contiguous axial images of the abdomen and pelvis were obtained after  the intravenous administration of  contrast. Coronal and sagittal  reformatted images were obtained from the axial images.      FINDINGS:  There is limited evaluation of the lung bases.  Mild basilar atelectasis.  Coronary artery atherosclerotic calcifications.      2.8 cm cyst in the left hepatic lobe and several subcentimeter  hepatic hypodensities which are too small to characterize. The  gallbladder is present. No dilatation common bile duct.      The pancreas, spleen, and adrenal glands appear unremarkable.      Symmetric enhancement of the kidneys.  No hydronephrosis.      No evidence of bowel obstruction or acute appendicitis. Fluid-filled  segments of colon. There is colonic diverticulosis with prominent  wall thickening and edema of the surrounding the sigmoid colon with  findings compatible with colonic perforation and there is a 3.7 x 2.8  cm abscess involving the wall of the sigmoid  colon. There prominent  lymph nodes superior to sigmoid colon which measure up to 1.6 cm.      Urinary bladder is underdistended and not well evaluated.      Multilevel degenerative change of the lumbar spine.      Impression: Colonic diverticulosis with prominent wall thickening and edema of  the sigmoid colon compatible with acute diverticulitis and evidence  of component of perforation with prominent surrounding edema and a  3.7 cm x 2.8 cm abscess in the wall of the sigmoid colon. Prominent  and enlarged lymph nodes superior to the sigmoid colon measure up to  1.6 cm.      Follow-up colonoscopy is recommended after treatment to exclude other  underlying pathology including mass/malignancy.      MACRO:  None      Signed by: Flavio Miranda 6/23/2024 7:35 PM  Dictation workstation:   [id number]      Physical Exam    Gen: lying comfortably in bed, not in acute distress  HEENT: atraumatic, normocephalic  Pulm: normal respiratory effort, clear to auscultation b/l  Cardiac: RRR, no murmurs noted, normal S1/S2  GI: Soft, nontender, BS+  MSK: normal ROM without joint swelling  Extremities: no LE edema, cyanosis  Neuro: AOX3, CN II-XII grossly intact, equal b/l strength, no loss in sensation   Psych: calm and appropriate for situation        Assessment/Plan      Jatinder Keenan is a 63 y.o. male Ex smoker with a history of CAD s/p MI and PCI to RCA x 2 (2016), HTN, HLD, remote PE, and CVA (not on Eliquis)  who presented to the hospital for constipation and abdominal pain and was found to have acute diverticulitis with perforation    Acute diverticulitis with perforation/sigmoid abscess  Continue cefepime and Flagyl  Blood cultures pending  Continue IV fluids at 125 cc an hour  Keep n.p.o. with sips and meds  Surgery consulted  Continue morphine Toradol as needed for pain  Hyperlipidemia  Continue Crestor and Zetia    Hypertension/coronary artery disease  Continue metoprolol, aspirin, Crestor and Zetia    DVT  prophylaxis  Heparin and SCDs        Jc Renner DO

## 2024-06-24 NOTE — H&P
Patient: Jatinder Keenan   Age: 63 y.o.   Gender: male   Room/Bed: 122/122-A     Attending: Jc Renenr DO  Code Status:  Full Code    History of Presenting Illness  Patient: Jatinder Keenan is a 63 y.o. male who presented to the hospital for acute diverticulitis with perforation and abscess.  Patient states that he developed lower abdominal pelvic pain and constipation 2 weeks ago.  He seen his PCP which gave him laxatives as well as bowel prep using endoscopy which only made his symptoms worse.  A week later he developed fever chills and progressive symptoms.  He denies any nausea or vomiting or sick contacts.  No new medications.  Does smoke tobacco denies any alcohol use denies any illicit drug use.      Past Medical History   Past Medical History:   Diagnosis Date    Acute maxillary sinusitis, unspecified 04/03/2015    Acute maxillary sinusitis    Body mass index (BMI) 37.0-37.9, adult 07/17/2019    BMI 37.0-37.9, adult    Chest pain on breathing 09/10/2015    Chest pain on respiration    Contact dermatitis due to poison ivy 06/22/2024    COVID-19 virus infection 03/16/2023    Laceration of left elbow 03/16/2023    Lumbago with sciatica, left side 07/19/2017    Acute left-sided low back pain with left-sided sciatica    Muscle pain 03/16/2023    Other specified abnormal findings of blood chemistry     Abnormal liver function test    Personal history of diseases of the skin and subcutaneous tissue 01/30/2014    History of folliculitis    Personal history of other diseases of the digestive system 03/29/2017    History of lower gastrointestinal bleeding    Personal history of other diseases of the digestive system 02/12/2018    History of gastrointestinal hemorrhage    Personal history of other specified conditions 01/30/2014    History of headache    Personal history of other specified conditions 11/15/2018    History of epigastric pain    Personal history of pulmonary embolism 06/20/2022     History of pulmonary embolism    Polymyalgia rheumatica (Multi) 11/15/2018    Polymyalgia    Weakness 05/07/2018    Left-sided weakness      Past Surgical History:   Past Surgical History:   Procedure Laterality Date    COLONOSCOPY  03/29/2017    Complete Colonoscopy    CORONARY ANGIOPLASTY WITH STENT PLACEMENT  02/06/2018    Cath Stent Placement    ESOPHAGOGASTRODUODENOSCOPY  03/29/2017    Diagnostic Esophagogastroduodenoscopy    HERNIA REPAIR  06/03/2013    Hernia Repair    LUNG SURGERY  09/28/2013    Lung Surgery    MR HEAD ANGIO WO IV CONTRAST  4/30/2018    MR HEAD ANGIO WO IV CONTRAST 4/30/2018 [facility]    MR NECK ANGIO WO IV CONTRAST  4/30/2018    MR NECK ANGIO WO IV CONTRAST 4/30/2018 [facility]    OTHER SURGICAL HISTORY  06/03/2013    Spinal Diskectomy Lumbar     Family History:   Family History   Problem Relation Name Age of Onset    Diabetes Mother      Heart failure Father       Social History:   Tobacco Use: High Risk (6/22/2024)    Patient History     Smoking Tobacco Use: Some Days     Smokeless Tobacco Use: Never     Passive Exposure: Not on file      Social History     Substance and Sexual Activity   Alcohol Use None       Outpatient Medications:  Current Outpatient Medications   Medication Instructions    aspirin 81 mg, oral, Daily    ezetimibe (Zetia) 10 mg tablet 1 tablet, oral, Daily    metoprolol succinate XL (Toprol-XL) 25 mg 24 hr tablet 1 tablet, oral, Daily    nitroglycerin (NITROSTAT) 0.4 mg, sublingual, Every 5 min PRN    rosuvastatin (Crestor) 20 mg tablet 1 tablet, oral, Daily        ED Course   Vitals - /71 (BP Location: Left arm, Patient Position: Sitting)   Pulse 74   Temp 36.5 °C (97.7 °F) (Temporal)   Resp 17   Wt 74.8 kg (165 lb)   SpO2 93%     Labs:   CBC:  Recent Labs     06/24/24  0525 06/23/24  1541 06/20/22  0930   WBC 13.8* 17.0* 7.8   HGB 12.8* 14.1 14.6   HCT 39.1* 41.1 44.1    323 230    97 101*     CMP:  Recent Labs      "06/23/24  1744 06/23/24  1633 06/20/22  0930 06/16/21  0737   NA  --  135* 139 138   K 3.8 6.2* 4.4 4.6   CL  --  97* 105 103   CO2  --  21 29 27   ANIONGAP  --  23* 9* 13   BUN  --  14 16 16   CREATININE  --  0.82 0.98 0.84   EGFR  --  >90  --   --    GLUCOSE  --  114* 85 93     Recent Labs     06/23/24  1633 06/20/22  0930 06/16/21  0737 02/23/21  1108 07/17/19  1221 02/03/18  0547   ALBUMIN 3.6 4.2 4.3 4.3   < > 3.7   ALKPHOS 53  --   --  75  --  46   ALT 20  --   --  27  --  32   AST 34  --   --  14  --  20   BILITOT 0.5  --   --  0.7  --  0.6    < > = values in this interval not displayed.     Calcium/Phos:   Lab Results   Component Value Date    CALCIUM 9.0 06/23/2024    PHOS 3.1 06/20/2022      COAG:   Recent Labs     06/24/24  0525 04/29/18  2030 02/02/18  1330   INR 1.4* 1.1 1.0     CRP:   Lab Results   Component Value Date    CRP 12.31 (H) 06/23/2024      [unfilled]   ENDO:  Recent Labs     04/30/18  0622   HGBA1C 6.4      CARDIAC:   Recent Labs     04/30/18  0622   BNP 10     Recent Labs     06/20/22  0930 06/16/21  0737 07/17/19  1221   CHOL 82 99 93   LDLF 30 43 32   HDL 42.5 36.4* 30.1*   TRIG 46 96 153*     No data recorded    Micro/ID:   No results found for the last 90 days.                   No lab exists for component: \"AGALPCRNB\"   .ID  Lab Results   Component Value Date    BLOODCULT Loaded on Instrument - Culture in progress 06/23/2024    BLOODCULT Loaded on Instrument - Culture in progress 06/23/2024       Imaging:   No orders to display   No results found for this or any previous visit (from the past 4464 hour(s)).  No echocardiogram results found for the past 12 months  CT abdomen pelvis w IV contrast    Result Date: 6/23/2024  Interpreted By:  Flavio Miranda, STUDY: CT ABDOMEN PELVIS W IV CONTRAST;  6/23/2024 6:28 pm   INDICATION: Signs/Symptoms:Constipation for 2 weeks.  Pain to bilateral lower quadrants.   COMPARISON: None.   ACCESSION NUMBER(S): [id number]   ORDERING CLINICIAN: " AYAAN SHEETS   TECHNIQUE: Contiguous axial images of the abdomen and pelvis were obtained after the intravenous administration of  contrast. Coronal and sagittal reformatted images were obtained from the axial images.   FINDINGS: There is limited evaluation of the lung bases. Mild basilar atelectasis. Coronary artery atherosclerotic calcifications.   2.8 cm cyst in the left hepatic lobe and several subcentimeter hepatic hypodensities which are too small to characterize. The gallbladder is present. No dilatation common bile duct.   The pancreas, spleen, and adrenal glands appear unremarkable.   Symmetric enhancement of the kidneys. No hydronephrosis.   No evidence of bowel obstruction or acute appendicitis. Fluid-filled segments of colon. There is colonic diverticulosis with prominent wall thickening and edema of the surrounding the sigmoid colon with findings compatible with colonic perforation and there is a 3.7 x 2.8 cm abscess involving the wall of the sigmoid colon. There prominent lymph nodes superior to sigmoid colon which measure up to 1.6 cm.   Urinary bladder is underdistended and not well evaluated.   Multilevel degenerative change of the lumbar spine.       Colonic diverticulosis with prominent wall thickening and edema of the sigmoid colon compatible with acute diverticulitis and evidence of component of perforation with prominent surrounding edema and a 3.7 cm x 2.8 cm abscess in the wall of the sigmoid colon. Prominent and enlarged lymph nodes superior to the sigmoid colon measure up to 1.6 cm.   Follow-up colonoscopy is recommended after treatment to exclude other underlying pathology including mass/malignancy.   MACRO: None   Signed by: Flavio Miranda 6/23/2024 7:35 PM Dictation workstation:   [id number]     Interventions:  Medications   polyethylene glycol (Glycolax, Miralax) packet 17 g ( oral Dose Auto Held 6/28/24 0900)   cefepime (Maxipime) IV 2 g (2 g intravenous New Bag 6/24/24 1230)    metroNIDAZOLE (Flagyl) 500 mg in NaCl (iso-os) 100 mL (0 mg intravenous Stopped 6/24/24 0601)   morphine injection 2 mg (2 mg intravenous Given 6/24/24 1219)   morphine injection 4 mg (4 mg intravenous Given 6/24/24 0501)   ondansetron (Zofran) tablet 4 mg (has no administration in time range)     Or   ondansetron (Zofran) injection 4 mg (has no administration in time range)   lactated Ringer's infusion (75 mL/hr intravenous Rate/Dose Change 6/24/24 1124)   aspirin EC tablet 81 mg (81 mg oral Given 6/24/24 0849)   ezetimibe (Zetia) tablet 10 mg (10 mg oral Given 6/24/24 0849)   metoprolol succinate XL (Toprol-XL) 24 hr tablet 25 mg (25 mg oral Given 6/24/24 0849)   nitroglycerin (Nitrostat) SL tablet 0.4 mg (has no administration in time range)   rosuvastatin (Crestor) tablet 20 mg (20 mg oral Given 6/24/24 0849)   heparin (porcine) injection 5,000 Units (5,000 Units subcutaneous Given 6/24/24 0635)   bisacodyl (Dulcolax) suppository 10 mg (10 mg rectal Given 6/24/24 0901)   sodium chloride 0.9 % bolus 1,000 mL (0 mL intravenous Stopped 6/23/24 1657)   ketorolac (Toradol) injection 15 mg (15 mg intravenous Given 6/23/24 1552)   ondansetron (Zofran) injection 4 mg (4 mg intravenous Given 6/23/24 1554)   iohexol (OMNIPaque) 350 mg iodine/mL solution 75 mL (75 mL intravenous Given 6/23/24 1823)   morphine injection 4 mg (4 mg intravenous Given 6/23/24 1901)   ciprofloxacin (Cipro) IVPB 400 mg (0 mg intravenous Stopped 6/23/24 2156)   metroNIDAZOLE (Flagyl) 500 mg in NaCl (iso-os) 100 mL (0 mg intravenous Stopped 6/23/24 2259)   HYDROmorphone (Dilaudid) injection 1 mg (1 mg intravenous Given 6/23/24 2055)       Objective Data  Physical Exam  Constitutional:       General: He is not in acute distress.     Appearance: Normal appearance.   HENT:      Head: Normocephalic and atraumatic.      Right Ear: Tympanic membrane, ear canal and external ear normal.      Left Ear: Tympanic membrane, ear canal and external ear  normal.      Nose: Nose normal.      Mouth/Throat:      Mouth: Mucous membranes are moist.      Pharynx: Oropharynx is clear.   Eyes:      General: No scleral icterus.     Extraocular Movements: Extraocular movements intact.      Conjunctiva/sclera: Conjunctivae normal.      Pupils: Pupils are equal, round, and reactive to light.   Cardiovascular:      Rate and Rhythm: Normal rate and regular rhythm.      Pulses: Normal pulses.      Heart sounds: Normal heart sounds. No murmur heard.     No gallop.   Pulmonary:      Effort: Pulmonary effort is normal.      Breath sounds: Normal breath sounds.   Abdominal:      General: Abdomen is flat. Bowel sounds are normal.      Palpations: Abdomen is soft.      Tenderness: There is abdominal tenderness. There is no guarding or rebound.   Musculoskeletal:         General: Normal range of motion.      Cervical back: Normal range of motion and neck supple.      Right lower leg: No edema.      Left lower leg: No edema.   Skin:     General: Skin is warm and dry.      Capillary Refill: Capillary refill takes less than 2 seconds.      Coloration: Skin is not jaundiced or pale.      Findings: No bruising, erythema, lesion or rash.   Neurological:      General: No focal deficit present.      Mental Status: He is alert and oriented to person, place, and time. Mental status is at baseline.      Cranial Nerves: No cranial nerve deficit.      Sensory: No sensory deficit.      Motor: No weakness.      Coordination: Coordination normal.      Deep Tendon Reflexes: Reflexes normal.   Psychiatric:         Mood and Affect: Mood normal.         Behavior: Behavior normal.                Assessment and Plan   Jatinder Keenan is a 63 y.o. male presenting for acute diverticulitis with perforation and abscess     Assessment:  #acute diverticulitis with perforation and abscess in the wall of sigmoid colon, blood cxs pending     Recommendations:   -continue cefepime and flagyl   -surgery follow.  Recommend IR consult to if abscess amendable to drainage     Curt Smith, DO   PGY-2, IM           Attending note : the patient was evaluated, the note was reviewed and updated  Sigmoid diverticulitis / abscess  Recommendations :  Continue Cefepime and Flagyl  Cultures  We may want to see if IR is able to drain  Bowel rest    Andressa Chery M.D

## 2024-06-25 ENCOUNTER — APPOINTMENT (OUTPATIENT)
Dept: RADIOLOGY | Facility: HOSPITAL | Age: 64
End: 2024-06-25
Payer: COMMERCIAL

## 2024-06-25 LAB
ALBUMIN SERPL BCP-MCNC: 3.1 G/DL (ref 3.4–5)
ANION GAP SERPL CALC-SCNC: 11 MMOL/L (ref 10–20)
BUN SERPL-MCNC: 11 MG/DL (ref 6–23)
CALCIUM SERPL-MCNC: 8.6 MG/DL (ref 8.6–10.3)
CHLORIDE SERPL-SCNC: 101 MMOL/L (ref 98–107)
CO2 SERPL-SCNC: 28 MMOL/L (ref 21–32)
CREAT SERPL-MCNC: 0.82 MG/DL (ref 0.5–1.3)
EGFRCR SERPLBLD CKD-EPI 2021: >90 ML/MIN/1.73M*2
ERYTHROCYTE [DISTWIDTH] IN BLOOD BY AUTOMATED COUNT: 11.2 % (ref 11.5–14.5)
GLUCOSE SERPL-MCNC: 88 MG/DL (ref 74–99)
HCT VFR BLD AUTO: 36.6 % (ref 41–52)
HGB BLD-MCNC: 12.1 G/DL (ref 13.5–17.5)
MCH RBC QN AUTO: 32.3 PG (ref 26–34)
MCHC RBC AUTO-ENTMCNC: 33.1 G/DL (ref 32–36)
MCV RBC AUTO: 98 FL (ref 80–100)
NRBC BLD-RTO: 0 /100 WBCS (ref 0–0)
PHOSPHATE SERPL-MCNC: 3.2 MG/DL (ref 2.5–4.9)
PLATELET # BLD AUTO: 335 X10*3/UL (ref 150–450)
POTASSIUM SERPL-SCNC: 4.1 MMOL/L (ref 3.5–5.3)
RBC # BLD AUTO: 3.75 X10*6/UL (ref 4.5–5.9)
SODIUM SERPL-SCNC: 136 MMOL/L (ref 136–145)
WBC # BLD AUTO: 9.8 X10*3/UL (ref 4.4–11.3)

## 2024-06-25 PROCEDURE — 85027 COMPLETE CBC AUTOMATED: CPT | Performed by: FAMILY MEDICINE

## 2024-06-25 PROCEDURE — 2500000004 HC RX 250 GENERAL PHARMACY W/ HCPCS (ALT 636 FOR OP/ED): Performed by: RADIOLOGY

## 2024-06-25 PROCEDURE — 1100000001 HC PRIVATE ROOM DAILY

## 2024-06-25 PROCEDURE — 99233 SBSQ HOSP IP/OBS HIGH 50: CPT | Performed by: STUDENT IN AN ORGANIZED HEALTH CARE EDUCATION/TRAINING PROGRAM

## 2024-06-25 PROCEDURE — 87070 CULTURE OTHR SPECIMN AEROBIC: CPT | Mod: GEALAB | Performed by: RADIOLOGY

## 2024-06-25 PROCEDURE — 10160 PNXR ASPIR ABSC HMTMA BULLA: CPT | Performed by: RADIOLOGY

## 2024-06-25 PROCEDURE — 2550000001 HC RX 255 CONTRASTS: Performed by: STUDENT IN AN ORGANIZED HEALTH CARE EDUCATION/TRAINING PROGRAM

## 2024-06-25 PROCEDURE — C1729 CATH, DRAINAGE: HCPCS

## 2024-06-25 PROCEDURE — 36415 COLL VENOUS BLD VENIPUNCTURE: CPT | Performed by: FAMILY MEDICINE

## 2024-06-25 PROCEDURE — 2500000004 HC RX 250 GENERAL PHARMACY W/ HCPCS (ALT 636 FOR OP/ED)

## 2024-06-25 PROCEDURE — 80069 RENAL FUNCTION PANEL: CPT | Performed by: FAMILY MEDICINE

## 2024-06-25 PROCEDURE — 99152 MOD SED SAME PHYS/QHP 5/>YRS: CPT

## 2024-06-25 PROCEDURE — 2500000002 HC RX 250 W HCPCS SELF ADMINISTERED DRUGS (ALT 637 FOR MEDICARE OP, ALT 636 FOR OP/ED)

## 2024-06-25 PROCEDURE — 77012 CT SCAN FOR NEEDLE BIOPSY: CPT

## 2024-06-25 PROCEDURE — 77012 CT SCAN FOR NEEDLE BIOPSY: CPT | Performed by: RADIOLOGY

## 2024-06-25 PROCEDURE — 87185 SC STD ENZYME DETCJ PER NZM: CPT | Mod: GEALAB | Performed by: RADIOLOGY

## 2024-06-25 PROCEDURE — 2720000007 HC OR 272 NO HCPCS

## 2024-06-25 PROCEDURE — 99152 MOD SED SAME PHYS/QHP 5/>YRS: CPT | Performed by: RADIOLOGY

## 2024-06-25 PROCEDURE — 2500000001 HC RX 250 WO HCPCS SELF ADMINISTERED DRUGS (ALT 637 FOR MEDICARE OP): Performed by: SURGERY

## 2024-06-25 PROCEDURE — 99231 SBSQ HOSP IP/OBS SF/LOW 25: CPT | Performed by: SURGERY

## 2024-06-25 RX ORDER — FENTANYL CITRATE 50 UG/ML
INJECTION, SOLUTION INTRAMUSCULAR; INTRAVENOUS
Status: COMPLETED
Start: 2024-06-25 | End: 2024-06-26

## 2024-06-25 RX ORDER — MIDAZOLAM HYDROCHLORIDE 1 MG/ML
INJECTION, SOLUTION INTRAMUSCULAR; INTRAVENOUS
Status: DISPENSED
Start: 2024-06-25 | End: 2024-06-25

## 2024-06-25 RX ORDER — FENTANYL CITRATE 50 UG/ML
INJECTION, SOLUTION INTRAMUSCULAR; INTRAVENOUS
Status: COMPLETED | OUTPATIENT
Start: 2024-06-25 | End: 2024-06-25

## 2024-06-25 RX ORDER — MIDAZOLAM HYDROCHLORIDE 5 MG/ML
INJECTION INTRAMUSCULAR; INTRAVENOUS
Status: COMPLETED | OUTPATIENT
Start: 2024-06-25 | End: 2024-06-25

## 2024-06-25 RX ADMIN — MIDAZOLAM 1 MG: 5 INJECTION INTRAMUSCULAR; INTRAVENOUS at 10:28

## 2024-06-25 RX ADMIN — FENTANYL CITRATE 100 MCG: 50 INJECTION, SOLUTION INTRAMUSCULAR; INTRAVENOUS at 10:27

## 2024-06-25 RX ADMIN — CEFEPIME 2 G: 1 INJECTION, SOLUTION INTRAVENOUS at 14:06

## 2024-06-25 RX ADMIN — IOHEXOL 35 ML: 350 INJECTION, SOLUTION INTRAVENOUS at 10:55

## 2024-06-25 RX ADMIN — METRONIDAZOLE 500 MG: 500 INJECTION, SOLUTION INTRAVENOUS at 05:35

## 2024-06-25 RX ADMIN — MORPHINE SULFATE 4 MG: 4 INJECTION INTRAVENOUS at 03:17

## 2024-06-25 RX ADMIN — HEPARIN SODIUM 5000 UNITS: 5000 INJECTION INTRAVENOUS; SUBCUTANEOUS at 05:36

## 2024-06-25 RX ADMIN — MORPHINE SULFATE 2 MG: 2 INJECTION, SOLUTION INTRAMUSCULAR; INTRAVENOUS at 18:23

## 2024-06-25 RX ADMIN — ONDANSETRON 4 MG: 2 INJECTION INTRAMUSCULAR; INTRAVENOUS at 03:26

## 2024-06-25 RX ADMIN — ROSUVASTATIN CALCIUM 20 MG: 20 TABLET, FILM COATED ORAL at 11:34

## 2024-06-25 RX ADMIN — EZETIMIBE 10 MG: 10 TABLET ORAL at 11:32

## 2024-06-25 RX ADMIN — CEFEPIME 2 G: 1 INJECTION, SOLUTION INTRAVENOUS at 20:39

## 2024-06-25 RX ADMIN — CEFEPIME 2 G: 1 INJECTION, SOLUTION INTRAVENOUS at 05:00

## 2024-06-25 RX ADMIN — METRONIDAZOLE 500 MG: 500 INJECTION, SOLUTION INTRAVENOUS at 17:06

## 2024-06-25 SDOH — ECONOMIC STABILITY: FOOD INSECURITY

## 2024-06-25 SDOH — ECONOMIC STABILITY: TRANSPORTATION INSECURITY

## 2024-06-25 SDOH — ECONOMIC STABILITY: HOUSING INSECURITY

## 2024-06-25 SDOH — ECONOMIC STABILITY: GENERAL

## 2024-06-25 ASSESSMENT — PAIN DESCRIPTION - LOCATION: LOCATION: ABDOMEN

## 2024-06-25 ASSESSMENT — COGNITIVE AND FUNCTIONAL STATUS - GENERAL
DAILY ACTIVITIY SCORE: 24
MOBILITY SCORE: 24
MOBILITY SCORE: 24
DAILY ACTIVITIY SCORE: 24

## 2024-06-25 ASSESSMENT — PAIN SCALES - GENERAL
PAINLEVEL_OUTOF10: 0 - NO PAIN
PAINLEVEL_OUTOF10: 0 - NO PAIN
PAINLEVEL_OUTOF10: 3
PAINLEVEL_OUTOF10: 0 - NO PAIN
PAINLEVEL_OUTOF10: 5 - MODERATE PAIN
PAINLEVEL_OUTOF10: 7
PAINLEVEL_OUTOF10: 2
PAINLEVEL_OUTOF10: 2

## 2024-06-25 ASSESSMENT — PAIN - FUNCTIONAL ASSESSMENT
PAIN_FUNCTIONAL_ASSESSMENT: 0-10

## 2024-06-25 NOTE — PROGRESS NOTES
"Jatinder Keenan is a 63 y.o. male on day 2 of admission presenting with Diverticulitis of colon with perforation.    Subjective   Pt is having intermittent abdominal pain. He said he had flatulence overnight.        Objective     Physical Exam  Vitals and nursing note reviewed.   Constitutional:       General: He is not in acute distress.     Appearance: Normal appearance. He is not ill-appearing or toxic-appearing.   HENT:      Head: Normocephalic and atraumatic.      Nose: Nose normal.      Mouth/Throat:      Mouth: Mucous membranes are moist.   Eyes:      Extraocular Movements: Extraocular movements intact.      Conjunctiva/sclera: Conjunctivae normal.      Pupils: Pupils are equal, round, and reactive to light.   Cardiovascular:      Rate and Rhythm: Normal rate and regular rhythm.      Heart sounds: No murmur heard.     No gallop.   Pulmonary:      Effort: Pulmonary effort is normal. No respiratory distress.      Breath sounds: Normal breath sounds. No wheezing, rhonchi or rales.   Abdominal:      General: Abdomen is flat. Bowel sounds are normal. There is no distension.      Palpations: Abdomen is soft. There is no mass.      Tenderness: There is abdominal tenderness.   Musculoskeletal:         General: No swelling or tenderness. Normal range of motion.      Cervical back: Normal range of motion and neck supple.   Skin:     General: Skin is warm and dry.   Neurological:      General: No focal deficit present.      Mental Status: He is alert and oriented to person, place, and time. Mental status is at baseline.   Psychiatric:         Mood and Affect: Mood normal.         Behavior: Behavior normal.         Thought Content: Thought content normal.         Judgment: Judgment normal.         Last Recorded Vitals:  /72 (BP Location: Right arm, Patient Position: Lying)   Pulse 60   Temp 36.8 °C (98.2 °F) (Temporal)   Resp 16   Ht 1.702 m (5' 7\")   Wt 74.8 kg (165 lb)   SpO2 93%   BMI 25.84 kg/m²  "     Scheduled medications:  [Held by provider] aspirin, 81 mg, oral, Daily  bisacodyl, 10 mg, rectal, Daily  cefepime, 2 g, intravenous, q8h  ezetimibe, 10 mg, oral, Daily  [Held by provider] heparin (porcine), 5,000 Units, subcutaneous, q8h  metoprolol succinate XL, 25 mg, oral, Daily  metroNIDAZOLE, 500 mg, intravenous, q8h  [Held by provider] polyethylene glycol, 17 g, oral, Daily  rosuvastatin, 20 mg, oral, Daily      Continuous medications:  lactated Ringer's, 75 mL/hr, Last Rate: 75 mL/hr (06/25/24 0152)      PRN medications:  PRN medications: morphine, morphine, nitroglycerin, ondansetron **OR** ondansetron     Relevant Results:  Results for orders placed or performed during the hospital encounter of 06/23/24 (from the past 24 hour(s))   Renal Function Panel   Result Value Ref Range    Glucose 88 74 - 99 mg/dL    Sodium 136 136 - 145 mmol/L    Potassium 4.1 3.5 - 5.3 mmol/L    Chloride 101 98 - 107 mmol/L    Bicarbonate 28 21 - 32 mmol/L    Anion Gap 11 10 - 20 mmol/L    Urea Nitrogen 11 6 - 23 mg/dL    Creatinine 0.82 0.50 - 1.30 mg/dL    eGFR >90 >60 mL/min/1.73m*2    Calcium 8.6 8.6 - 10.3 mg/dL    Phosphorus 3.2 2.5 - 4.9 mg/dL    Albumin 3.1 (L) 3.4 - 5.0 g/dL   CBC   Result Value Ref Range    WBC 9.8 4.4 - 11.3 x10*3/uL    nRBC 0.0 0.0 - 0.0 /100 WBCs    RBC 3.75 (L) 4.50 - 5.90 x10*6/uL    Hemoglobin 12.1 (L) 13.5 - 17.5 g/dL    Hematocrit 36.6 (L) 41.0 - 52.0 %    MCV 98 80 - 100 fL    MCH 32.3 26.0 - 34.0 pg    MCHC 33.1 32.0 - 36.0 g/dL    RDW 11.2 (L) 11.5 - 14.5 %    Platelets 335 150 - 450 x10*3/uL       CT abdomen pelvis w IV contrast    Result Date: 6/23/2024  Interpreted By:  Flavio Miranda, STUDY: CT ABDOMEN PELVIS W IV CONTRAST;  6/23/2024 6:28 pm   INDICATION: Signs/Symptoms:Constipation for 2 weeks.  Pain to bilateral lower quadrants.   COMPARISON: None.   ACCESSION NUMBER(S): [id number]   ORDERING CLINICIAN: AAYAN SHEETS   TECHNIQUE: Contiguous axial images of the abdomen and pelvis were  obtained after the intravenous administration of  contrast. Coronal and sagittal reformatted images were obtained from the axial images.   FINDINGS: There is limited evaluation of the lung bases. Mild basilar atelectasis. Coronary artery atherosclerotic calcifications.   2.8 cm cyst in the left hepatic lobe and several subcentimeter hepatic hypodensities which are too small to characterize. The gallbladder is present. No dilatation common bile duct.   The pancreas, spleen, and adrenal glands appear unremarkable.   Symmetric enhancement of the kidneys. No hydronephrosis.   No evidence of bowel obstruction or acute appendicitis. Fluid-filled segments of colon. There is colonic diverticulosis with prominent wall thickening and edema of the surrounding the sigmoid colon with findings compatible with colonic perforation and there is a 3.7 x 2.8 cm abscess involving the wall of the sigmoid colon. There prominent lymph nodes superior to sigmoid colon which measure up to 1.6 cm.   Urinary bladder is underdistended and not well evaluated.   Multilevel degenerative change of the lumbar spine.       Colonic diverticulosis with prominent wall thickening and edema of the sigmoid colon compatible with acute diverticulitis and evidence of component of perforation with prominent surrounding edema and a 3.7 cm x 2.8 cm abscess in the wall of the sigmoid colon. Prominent and enlarged lymph nodes superior to the sigmoid colon measure up to 1.6 cm.   Follow-up colonoscopy is recommended after treatment to exclude other underlying pathology including mass/malignancy.   MACRO: None   Signed by: Flavio Miranda 6/23/2024 7:35 PM Dictation workstation:   [id number]           Assessment/Plan   Principal Problem:    Diverticulitis of colon with perforation  Active Problems:    Diverticulitis of large intestine with abscess without bleeding    Jatinder MATA Porfirioleonardo is a 63 y.o. male Ex smoker with a history of CAD s/p MI and PCI to RCA x 2  (2016), HTN, HLD, remote PE, and CVA (not on Eliquis)  who presented to the hospital for constipation and abdominal pain and was found to have acute diverticulitis with perforation     Acute diverticulitis with perforation/sigmoid abscess  - surg following  - drain per IR today  - cefepime and Flagyl  - ID following  - IVF  - morphine/toradol PRN for pain    Normocytic anemia  - monitor    Leukocytosis  - resolved    CAD  - crestor  - zetia  - hold ASA    HTN  - metoprolol     DVT prophylaxis: SCD, hold SQ hep for drain  Dispo: monitor clinically, drain placement per IR         Sushila Ferguson MD  Hospitalist

## 2024-06-25 NOTE — CARE PLAN
The patient's goals for the shift include  having a bowel movement    The clinical goals for the shift include Pt will have bowel movement this shift      Problem: Pain - Adult  Goal: Verbalizes/displays adequate comfort level or baseline comfort level  Outcome: Progressing     Problem: Safety - Adult  Goal: Free from fall injury  Outcome: Progressing     Problem: Discharge Planning  Goal: Discharge to home or other facility with appropriate resources  Outcome: Progressing     Problem: Chronic Conditions and Co-morbidities  Goal: Patient's chronic conditions and co-morbidity symptoms are monitored and maintained or improved  Outcome: Progressing

## 2024-06-25 NOTE — PROGRESS NOTES
"Jatinder Keenan is a 63 y.o. male on day 2 of admission presenting with Diverticulitis of colon with perforation.    Subjective   The patient feels about the same.  He is sitting in a wheelchair outside the CAT scan or about to undergo a percutaneous drainage of a pelvic abscess.       Objective he remains afebrile    Physical Exam abdomen is soft but still has lower abdominal tenderness.    Last Recorded Vitals  Blood pressure 130/78, pulse 70, temperature 36.8 °C (98.2 °F), temperature source Temporal, resp. rate 18, height 1.702 m (5' 7\"), weight 74.8 kg (165 lb), SpO2 94%.  Intake/Output last 3 Shifts:  I/O last 3 completed shifts:  In: 3347.5 (44.7 mL/kg) [I.V.:2647.5 (35.4 mL/kg); IV Piggyback:700]  Out: - (0 mL/kg)   Weight: 74.8 kg     Relevant Results              White blood cell count 9.8 which is an improvement.               Assessment/Plan   Principal Problem:    Diverticulitis of colon with perforation  Active Problems:    Diverticulitis of large intestine with abscess without bleeding    Undergoing percutaneous drainage of abscess today.       I spent 10 minutes in the professional and overall care of this patient.      Pallavi Nunn MD      "

## 2024-06-25 NOTE — PROGRESS NOTES
Patient: Jatinder Keenan Age: 63 y.o.   Gender: male Room/bed: 122/122-A     Attending: Sushila Ferguson MD  Code Status:  Full Code    Subjective  No OVN. No fever or new complaints. Having Ct guided aspiration today.     Objective:  Physical Exam  Constitutional:       General: He is not in acute distress.     Appearance: Normal appearance.   HENT:      Head: Normocephalic and atraumatic.      Right Ear: Tympanic membrane, ear canal and external ear normal.      Left Ear: Tympanic membrane, ear canal and external ear normal.      Nose: Nose normal.      Mouth/Throat:      Mouth: Mucous membranes are moist.      Pharynx: Oropharynx is clear.   Eyes:      General: No scleral icterus.     Extraocular Movements: Extraocular movements intact.      Conjunctiva/sclera: Conjunctivae normal.      Pupils: Pupils are equal, round, and reactive to light.   Cardiovascular:      Rate and Rhythm: Normal rate and regular rhythm.      Pulses: Normal pulses.      Heart sounds: Normal heart sounds. No murmur heard.     No gallop.   Pulmonary:      Effort: Pulmonary effort is normal.      Breath sounds: Normal breath sounds.   Abdominal:      General: Abdomen is flat. Bowel sounds are normal.      Palpations: Abdomen is soft.      Tenderness: There is abdominal tenderness. There is no guarding or rebound.   Musculoskeletal:         General: Normal range of motion.      Cervical back: Normal range of motion and neck supple.      Right lower leg: No edema.      Left lower leg: No edema.   Skin:     General: Skin is warm and dry.      Capillary Refill: Capillary refill takes less than 2 seconds.      Coloration: Skin is not jaundiced or pale.      Findings: No bruising, erythema, lesion or rash.   Neurological:      General: No focal deficit present.      Mental Status: He is alert and oriented to person, place, and time. Mental status is at baseline.      Deep Tendon Reflexes: Reflexes normal.   Psychiatric:         Mood and Affect:  Mood normal.         Behavior: Behavior normal.          Temp:  [36.6 °C (97.9 °F)-37.2 °C (99 °F)] 36.8 °C (98.2 °F)  Heart Rate:  [59-70] 66  Resp:  [15-18] 16  BP: (103-130)/(61-79) 107/66      Intake/Output Summary (Last 24 hours) at 6/25/2024 1207  Last data filed at 6/25/2024 0152  Gross per 24 hour   Intake 3347.5 ml   Output --   Net 3347.5 ml       Vitals:    06/25/24 0958   Weight: 74.8 kg (165 lb)             I/Os    Intake/Output Summary (Last 24 hours) at 6/25/2024 1207  Last data filed at 6/25/2024 0152  Gross per 24 hour   Intake 3347.5 ml   Output --   Net 3347.5 ml       Labs:   CBC:  Recent Labs     06/25/24  0646 06/24/24  0525 06/23/24  1541   WBC 9.8 13.8* 17.0*   HGB 12.1* 12.8* 14.1   HCT 36.6* 39.1* 41.1    279 323   MCV 98 100 97     CMP:  Recent Labs     06/25/24  0646 06/23/24  1744 06/23/24  1633 06/20/22  0930     --  135* 139   K 4.1 3.8 6.2* 4.4     --  97* 105   CO2 28  --  21 29   ANIONGAP 11  --  23* 9*   BUN 11  --  14 16   CREATININE 0.82  --  0.82 0.98   EGFR >90  --  >90  --    GLUCOSE 88  --  114* 85     Recent Labs     06/25/24  0646 06/23/24  1633 06/20/22  0930 06/16/21  0737 02/23/21  1108 07/17/19  1221 02/03/18  0547   ALBUMIN 3.1* 3.6 4.2   < > 4.3   < > 3.7   ALKPHOS  --  53  --   --  75  --  46   ALT  --  20  --   --  27  --  32   AST  --  34  --   --  14  --  20   BILITOT  --  0.5  --   --  0.7  --  0.6    < > = values in this interval not displayed.     Calcium/Phos:   Lab Results   Component Value Date    CALCIUM 8.6 06/25/2024    PHOS 3.2 06/25/2024      COAG:   Recent Labs     06/24/24  0525 04/29/18  2030 02/02/18  1330   INR 1.4* 1.1 1.0     CRP:   Lab Results   Component Value Date    CRP 12.31 (H) 06/23/2024      [unfilled]   ENDO:  Recent Labs     04/30/18  0622   HGBA1C 6.4      CARDIAC:   Recent Labs     04/30/18  0622   BNP 10     Recent Labs     06/20/22  0930 06/16/21  0737 07/17/19  1221   CHOL 82 99 93   LDLF 30 43 32   HDL 42.5  "36.4* 30.1*   TRIG 46 96 153*     No data recorded    Micro/ID:   No results found for the last 90 days.                   No lab exists for component: \"AGALPCRNB\"   .ID  Lab Results   Component Value Date    BLOODCULT No growth at 1 day 06/23/2024    BLOODCULT No growth at 1 day 06/23/2024       Images:  CT guided aspiration of abscess, hematoma, cyst  Narrative: Interpreted By:  Vaughn Mccarthy,   STUDY:  CT GUIDED ASPIRATION OF ABSCESS, HEMATOMA, CYST;  6/25/2024 10:55 am      INDICATION:  Signs/Symptoms:Intra-abdominal abscess drain.      COMPARISON:  CT abdomen from 06/23/2024      ACCESSION NUMBER(S):  [id number]      ORDERING CLINICIAN:  PORTER ROSE      TECHNIQUE:      CONSENT:  The patient/patient's POA/next of kin was informed of the nature of  the proposed procedure. The purposes, alternatives, risks, and  benefits were explained and discussed. All questions were answered  and consent was obtained.      RADIATION EXPOSURE:      SEDATION:  Moderate conscious IV sedation services (supervision of  administration, induction, and maintenance) were provided by the  physician performing the procedure with intravenous fentanyl 100 mcg  and versed 1 mg for 20 minutes. The physician was assisted by an  independent trained observer, an interventional radiology nurse, in  the continuous monitoring of patient level of consciousness and  physiologic status.      MEDICATION/CONTRAST:  No additional      TIME OUT:  A time out was performed immediately prior to procedure start with  the interventional team, correctly identifying the patient name, date  of birth, MRN, procedure, anatomy (including marking of site and  side), patient position, procedure consent form, relevant laboratory  and imaging test results, antibiotic administration, safety  precautions, and procedure-specific equipment needs.      COMPLICATIONS:  No immediate adverse events identified.      FINDINGS:  Limited axial CT images were obtained through " the abdomen at 5 mm  slice thickness. The images demonstrated  air pocket adjacent to the  sigmoid colon with minimal amount of fluid but no visible air-fluid  levels. There has been mild interval improvement in pericolonic fat  stranding and sigmoid wall edema/thickening when compared to previous  exam. No free air is seen to suggest perforation.. The patient was  placed on supine positioning and prepped in the usual sterile manner.  1% lidocaine was used for local anesthesia at the planned skin  insertion site.      Under direct CT guidance, a 5 Citizen of the [place] Yueh needle/catheter was placed  into the collection via  midline anterior abdominal wall approach.  After confirmation of position of the needle within the collection,  the inner needle/stylette was withdrawn. The content was aspirated  and demonstrates purulent fluid.  Subsequently, 10 mL of Omnipaque  350 contrast material was injected into the pocket. The contrast  material freely drains into the sigmoid colon. Given these findings  the Yueh catheter was removed and no drain was placed in the  collection.      Postprocedure images demonstrated no evidence of hemorrhage.      Patient tolerated the procedure without immediate complication. Fluid  was sent to the laboratory for further analysis.      Impression: Successful CT-guided aspiration of fluid collection in the  perisigmoid region as above.      Signed by: Vaughn Mccarthy 6/25/2024 11:22 AM  Dictation workstation:   [id number]       Medications:  Scheduled medications  [Held by provider] aspirin, 81 mg, oral, Daily  bisacodyl, 10 mg, rectal, Daily  cefepime, 2 g, intravenous, q8h  ezetimibe, 10 mg, oral, Daily  fentaNYL PF, , ,   [Held by provider] heparin (porcine), 5,000 Units, subcutaneous, q8h  metoprolol succinate XL, 25 mg, oral, Daily  metroNIDAZOLE, 500 mg, intravenous, q8h  midazolam, , ,   [Held by provider] polyethylene glycol, 17 g, oral, Daily  rosuvastatin, 20 mg, oral, Daily      Continuous  medications  lactated Ringer's, 75 mL/hr, Last Rate: 75 mL/hr (06/25/24 0152)      PRN medications  PRN medications: fentaNYL PF, midazolam, morphine, morphine, nitroglycerin, ondansetron **OR** ondansetron     Assessment and Plan:  Jatinder Keenan is a 63 y.o. male presenting for acute diverticulitis with perforation and wall abscess      Assessment:  #acute diverticulitis with perforation and abscess in the wall of sigmoid colon sp Ct guided aspiration, blood cxs negative x 1 day      Recommendations:   -continue cefepime and flagyl, follow cxs   -surgery following, bowel rest      Curt Smith, DO   PGY-2, IM         Attending note : the patient was evaluated, the note was reviewed and updated  Sigmoid diverticulitis / abscess, for ct drainage  Recommendations :  Continue Cefepime and Flagyl  Discussed with the medical team     Andressa Chery M.D

## 2024-06-25 NOTE — CARE PLAN
"The patient's goals for the shift include  \"feeling better\"    The clinical goals for the shift include Pt will pass flatus and have BM this shift.      Problem: Pain - Adult  Goal: Verbalizes/displays adequate comfort level or baseline comfort level  Outcome: Progressing  Flowsheets (Taken 6/25/2024 1310)  Verbalizes/displays adequate comfort level or baseline comfort level:   Assess pain using appropriate pain scale   Administer analgesics based on type and severity of pain and evaluate response   Encourage patient to monitor pain and request assistance     Problem: Chronic Conditions and Co-morbidities  Goal: Patient's chronic conditions and co-morbidity symptoms are monitored and maintained or improved  Outcome: Progressing  Flowsheets (Taken 6/25/2024 1310)  Care Plan - Patient's Chronic Conditions and Co-Morbidity Symptoms are Monitored and Maintained or Improved: Monitor and assess patient's chronic conditions and comorbid symptoms for stability, deterioration, or improvement     Problem: Constipation  Goal: I will have a regular, well-formed bowel movement every 2-3 days  Outcome: Met  Flowsheets (Taken 6/25/2024 1310)  Resident will have a regular, well-formed bowel movement every 2-3 days:   Consider implementing a bowel movement monitoring plan   Nursing staff will encourage fluid intake during and between meals for hydration support     Problem: Infection related to problem list condition  Goal: Infection will resolve through treatment  Outcome: Progressing  Flowsheets (Taken 6/25/2024 1310)  Infection will resolve through treatment:   Administer treatments as ordered by provider   Obtain labs/cultures as ordered by provider   Nurse will administer medications as ordered by provider   Observe for and document signs and symptoms of infection       "

## 2024-06-26 ENCOUNTER — ANESTHESIA (OUTPATIENT)
Dept: OPERATING ROOM | Facility: HOSPITAL | Age: 64
End: 2024-06-26
Payer: COMMERCIAL

## 2024-06-26 ENCOUNTER — ANESTHESIA EVENT (OUTPATIENT)
Dept: OPERATING ROOM | Facility: HOSPITAL | Age: 64
End: 2024-06-26
Payer: COMMERCIAL

## 2024-06-26 ENCOUNTER — APPOINTMENT (OUTPATIENT)
Dept: RADIOLOGY | Facility: HOSPITAL | Age: 64
DRG: 331 | End: 2024-06-26
Payer: COMMERCIAL

## 2024-06-26 LAB
ALBUMIN SERPL BCP-MCNC: 3.2 G/DL (ref 3.4–5)
ALP SERPL-CCNC: 45 U/L (ref 33–136)
ALT SERPL W P-5'-P-CCNC: 16 U/L (ref 10–52)
ANION GAP SERPL CALC-SCNC: 13 MMOL/L (ref 10–20)
AST SERPL W P-5'-P-CCNC: 19 U/L (ref 9–39)
BASOPHILS # BLD AUTO: 0.05 X10*3/UL (ref 0–0.1)
BASOPHILS NFR BLD AUTO: 0.6 %
BILIRUB SERPL-MCNC: 0.4 MG/DL (ref 0–1.2)
BUN SERPL-MCNC: 10 MG/DL (ref 6–23)
CALCIUM SERPL-MCNC: 8.7 MG/DL (ref 8.6–10.3)
CHLORIDE SERPL-SCNC: 102 MMOL/L (ref 98–107)
CO2 SERPL-SCNC: 27 MMOL/L (ref 21–32)
CREAT SERPL-MCNC: 0.77 MG/DL (ref 0.5–1.3)
EGFRCR SERPLBLD CKD-EPI 2021: >90 ML/MIN/1.73M*2
EOSINOPHIL # BLD AUTO: 0.3 X10*3/UL (ref 0–0.7)
EOSINOPHIL NFR BLD AUTO: 3.6 %
ERYTHROCYTE [DISTWIDTH] IN BLOOD BY AUTOMATED COUNT: 11.1 % (ref 11.5–14.5)
ERYTHROCYTE [DISTWIDTH] IN BLOOD BY AUTOMATED COUNT: 11.1 % (ref 11.5–14.5)
GLUCOSE SERPL-MCNC: 80 MG/DL (ref 74–99)
HCT VFR BLD AUTO: 37.7 % (ref 41–52)
HCT VFR BLD AUTO: 39.1 % (ref 41–52)
HGB BLD-MCNC: 12.7 G/DL (ref 13.5–17.5)
HGB BLD-MCNC: 13.3 G/DL (ref 13.5–17.5)
IMM GRANULOCYTES # BLD AUTO: 0.06 X10*3/UL (ref 0–0.7)
IMM GRANULOCYTES NFR BLD AUTO: 0.7 % (ref 0–0.9)
LYMPHOCYTES # BLD AUTO: 2.47 X10*3/UL (ref 1.2–4.8)
LYMPHOCYTES NFR BLD AUTO: 29.4 %
MCH RBC QN AUTO: 32.7 PG (ref 26–34)
MCH RBC QN AUTO: 32.7 PG (ref 26–34)
MCHC RBC AUTO-ENTMCNC: 33.7 G/DL (ref 32–36)
MCHC RBC AUTO-ENTMCNC: 34 G/DL (ref 32–36)
MCV RBC AUTO: 96 FL (ref 80–100)
MCV RBC AUTO: 97 FL (ref 80–100)
MONOCYTES # BLD AUTO: 0.74 X10*3/UL (ref 0.1–1)
MONOCYTES NFR BLD AUTO: 8.8 %
NEUTROPHILS # BLD AUTO: 4.78 X10*3/UL (ref 1.2–7.7)
NEUTROPHILS NFR BLD AUTO: 56.9 %
NRBC BLD-RTO: 0 /100 WBCS (ref 0–0)
NRBC BLD-RTO: 0 /100 WBCS (ref 0–0)
PLATELET # BLD AUTO: 366 X10*3/UL (ref 150–450)
PLATELET # BLD AUTO: 382 X10*3/UL (ref 150–450)
POTASSIUM SERPL-SCNC: 5.1 MMOL/L (ref 3.5–5.3)
PROT SERPL-MCNC: 6.5 G/DL (ref 6.4–8.2)
RBC # BLD AUTO: 3.88 X10*6/UL (ref 4.5–5.9)
RBC # BLD AUTO: 4.07 X10*6/UL (ref 4.5–5.9)
SODIUM SERPL-SCNC: 137 MMOL/L (ref 136–145)
WBC # BLD AUTO: 8.4 X10*3/UL (ref 4.4–11.3)
WBC # BLD AUTO: 9.5 X10*3/UL (ref 4.4–11.3)

## 2024-06-26 PROCEDURE — 99406 BEHAV CHNG SMOKING 3-10 MIN: CPT | Performed by: NURSE PRACTITIONER

## 2024-06-26 PROCEDURE — 44146 PARTIAL REMOVAL OF COLON: CPT | Performed by: SURGERY

## 2024-06-26 PROCEDURE — 3700000001 HC GENERAL ANESTHESIA TIME - INITIAL BASE CHARGE: Performed by: SURGERY

## 2024-06-26 PROCEDURE — 3600000003 HC OR TIME - INITIAL BASE CHARGE - PROCEDURE LEVEL THREE: Performed by: SURGERY

## 2024-06-26 PROCEDURE — 2500000004 HC RX 250 GENERAL PHARMACY W/ HCPCS (ALT 636 FOR OP/ED): Mod: JZ

## 2024-06-26 PROCEDURE — 0DBN0ZZ EXCISION OF SIGMOID COLON, OPEN APPROACH: ICD-10-PCS | Performed by: SURGERY

## 2024-06-26 PROCEDURE — 2500000005 HC RX 250 GENERAL PHARMACY W/O HCPCS: Performed by: NURSE ANESTHETIST, CERTIFIED REGISTERED

## 2024-06-26 PROCEDURE — 2500000005 HC RX 250 GENERAL PHARMACY W/O HCPCS

## 2024-06-26 PROCEDURE — 99232 SBSQ HOSP IP/OBS MODERATE 35: CPT | Performed by: NURSE PRACTITIONER

## 2024-06-26 PROCEDURE — 1100000001 HC PRIVATE ROOM DAILY

## 2024-06-26 PROCEDURE — 74176 CT ABD & PELVIS W/O CONTRAST: CPT | Performed by: RADIOLOGY

## 2024-06-26 PROCEDURE — 84075 ASSAY ALKALINE PHOSPHATASE: CPT | Performed by: STUDENT IN AN ORGANIZED HEALTH CARE EDUCATION/TRAINING PROGRAM

## 2024-06-26 PROCEDURE — 36415 COLL VENOUS BLD VENIPUNCTURE: CPT | Performed by: SURGERY

## 2024-06-26 PROCEDURE — 2720000007 HC OR 272 NO HCPCS: Performed by: SURGERY

## 2024-06-26 PROCEDURE — 88307 TISSUE EXAM BY PATHOLOGIST: CPT | Mod: TC,GEALAB | Performed by: SURGERY

## 2024-06-26 PROCEDURE — 85027 COMPLETE CBC AUTOMATED: CPT | Performed by: SURGERY

## 2024-06-26 PROCEDURE — 2500000005 HC RX 250 GENERAL PHARMACY W/O HCPCS: Performed by: ANESTHESIOLOGY

## 2024-06-26 PROCEDURE — 2500000004 HC RX 250 GENERAL PHARMACY W/ HCPCS (ALT 636 FOR OP/ED): Performed by: STUDENT IN AN ORGANIZED HEALTH CARE EDUCATION/TRAINING PROGRAM

## 2024-06-26 PROCEDURE — 2500000004 HC RX 250 GENERAL PHARMACY W/ HCPCS (ALT 636 FOR OP/ED): Performed by: SURGERY

## 2024-06-26 PROCEDURE — 74176 CT ABD & PELVIS W/O CONTRAST: CPT

## 2024-06-26 PROCEDURE — 3700000002 HC GENERAL ANESTHESIA TIME - EACH INCREMENTAL 1 MINUTE: Performed by: SURGERY

## 2024-06-26 PROCEDURE — 7100000002 HC RECOVERY ROOM TIME - EACH INCREMENTAL 1 MINUTE: Performed by: SURGERY

## 2024-06-26 PROCEDURE — 85025 COMPLETE CBC W/AUTO DIFF WBC: CPT | Performed by: STUDENT IN AN ORGANIZED HEALTH CARE EDUCATION/TRAINING PROGRAM

## 2024-06-26 PROCEDURE — 0DNN0ZZ RELEASE SIGMOID COLON, OPEN APPROACH: ICD-10-PCS | Performed by: SURGERY

## 2024-06-26 PROCEDURE — 36415 COLL VENOUS BLD VENIPUNCTURE: CPT | Performed by: STUDENT IN AN ORGANIZED HEALTH CARE EDUCATION/TRAINING PROGRAM

## 2024-06-26 PROCEDURE — 2500000004 HC RX 250 GENERAL PHARMACY W/ HCPCS (ALT 636 FOR OP/ED): Performed by: NURSE ANESTHETIST, CERTIFIED REGISTERED

## 2024-06-26 PROCEDURE — 88307 TISSUE EXAM BY PATHOLOGIST: CPT | Performed by: PATHOLOGY

## 2024-06-26 PROCEDURE — 0D1N0Z4 BYPASS SIGMOID COLON TO CUTANEOUS, OPEN APPROACH: ICD-10-PCS | Performed by: SURGERY

## 2024-06-26 PROCEDURE — 7100000001 HC RECOVERY ROOM TIME - INITIAL BASE CHARGE: Performed by: SURGERY

## 2024-06-26 PROCEDURE — 99232 SBSQ HOSP IP/OBS MODERATE 35: CPT | Performed by: STUDENT IN AN ORGANIZED HEALTH CARE EDUCATION/TRAINING PROGRAM

## 2024-06-26 PROCEDURE — 2500000002 HC RX 250 W HCPCS SELF ADMINISTERED DRUGS (ALT 637 FOR MEDICARE OP, ALT 636 FOR OP/ED)

## 2024-06-26 PROCEDURE — 3600000008 HC OR TIME - EACH INCREMENTAL 1 MINUTE - PROCEDURE LEVEL THREE: Performed by: SURGERY

## 2024-06-26 RX ORDER — ROCURONIUM BROMIDE 10 MG/ML
INJECTION, SOLUTION INTRAVENOUS AS NEEDED
Status: DISCONTINUED | OUTPATIENT
Start: 2024-06-26 | End: 2024-06-26

## 2024-06-26 RX ORDER — BUPIVACAINE HYDROCHLORIDE 5 MG/ML
INJECTION, SOLUTION EPIDURAL; INTRACAUDAL AS NEEDED
Status: DISCONTINUED | OUTPATIENT
Start: 2024-06-26 | End: 2024-06-26 | Stop reason: HOSPADM

## 2024-06-26 RX ORDER — LIDOCAINE HCL/PF 100 MG/5ML
SYRINGE (ML) INTRAVENOUS AS NEEDED
Status: DISCONTINUED | OUTPATIENT
Start: 2024-06-26 | End: 2024-06-26

## 2024-06-26 RX ORDER — SODIUM CHLORIDE 9 MG/ML
INJECTION, SOLUTION INTRAVENOUS CONTINUOUS PRN
Status: DISCONTINUED | OUTPATIENT
Start: 2024-06-26 | End: 2024-06-26

## 2024-06-26 RX ORDER — HYDROMORPHONE HYDROCHLORIDE 2 MG/ML
INJECTION, SOLUTION INTRAMUSCULAR; INTRAVENOUS; SUBCUTANEOUS AS NEEDED
Status: DISCONTINUED | OUTPATIENT
Start: 2024-06-26 | End: 2024-06-26

## 2024-06-26 RX ORDER — MORPHINE SULFATE 2 MG/ML
2 INJECTION, SOLUTION INTRAMUSCULAR; INTRAVENOUS ONCE
Status: COMPLETED | OUTPATIENT
Start: 2024-06-26 | End: 2024-06-26

## 2024-06-26 RX ORDER — ONDANSETRON HYDROCHLORIDE 2 MG/ML
4 INJECTION, SOLUTION INTRAVENOUS ONCE AS NEEDED
Status: DISCONTINUED | OUTPATIENT
Start: 2024-06-26 | End: 2024-06-27 | Stop reason: HOSPADM

## 2024-06-26 RX ORDER — SUCCINYLCHOLINE CHLORIDE 20 MG/ML
INJECTION INTRAMUSCULAR; INTRAVENOUS AS NEEDED
Status: DISCONTINUED | OUTPATIENT
Start: 2024-06-26 | End: 2024-06-26

## 2024-06-26 RX ORDER — SODIUM CHLORIDE, SODIUM LACTATE, POTASSIUM CHLORIDE, CALCIUM CHLORIDE 600; 310; 30; 20 MG/100ML; MG/100ML; MG/100ML; MG/100ML
100 INJECTION, SOLUTION INTRAVENOUS CONTINUOUS
Status: DISCONTINUED | OUTPATIENT
Start: 2024-06-26 | End: 2024-06-27 | Stop reason: HOSPADM

## 2024-06-26 RX ORDER — DROPERIDOL 2.5 MG/ML
0.62 INJECTION, SOLUTION INTRAMUSCULAR; INTRAVENOUS ONCE AS NEEDED
Status: DISCONTINUED | OUTPATIENT
Start: 2024-06-26 | End: 2024-06-27 | Stop reason: HOSPADM

## 2024-06-26 RX ORDER — PROPOFOL 10 MG/ML
INJECTION, EMULSION INTRAVENOUS AS NEEDED
Status: DISCONTINUED | OUTPATIENT
Start: 2024-06-26 | End: 2024-06-26

## 2024-06-26 RX ORDER — OXYCODONE HYDROCHLORIDE 5 MG/1
5 TABLET ORAL EVERY 4 HOURS PRN
Status: DISCONTINUED | OUTPATIENT
Start: 2024-06-26 | End: 2024-06-27 | Stop reason: HOSPADM

## 2024-06-26 RX ORDER — ONDANSETRON HYDROCHLORIDE 2 MG/ML
INJECTION, SOLUTION INTRAVENOUS AS NEEDED
Status: DISCONTINUED | OUTPATIENT
Start: 2024-06-26 | End: 2024-06-26

## 2024-06-26 RX ORDER — SODIUM CHLORIDE, SODIUM LACTATE, POTASSIUM CHLORIDE, CALCIUM CHLORIDE 600; 310; 30; 20 MG/100ML; MG/100ML; MG/100ML; MG/100ML
INJECTION, SOLUTION INTRAVENOUS CONTINUOUS PRN
Status: DISCONTINUED | OUTPATIENT
Start: 2024-06-26 | End: 2024-06-26

## 2024-06-26 RX ORDER — FENTANYL CITRATE 50 UG/ML
INJECTION, SOLUTION INTRAMUSCULAR; INTRAVENOUS AS NEEDED
Status: DISCONTINUED | OUTPATIENT
Start: 2024-06-26 | End: 2024-06-26

## 2024-06-26 RX ORDER — MIDAZOLAM HYDROCHLORIDE 1 MG/ML
INJECTION INTRAMUSCULAR; INTRAVENOUS AS NEEDED
Status: DISCONTINUED | OUTPATIENT
Start: 2024-06-26 | End: 2024-06-26

## 2024-06-26 RX ORDER — GLYCOPYRROLATE 0.2 MG/ML
INJECTION INTRAMUSCULAR; INTRAVENOUS AS NEEDED
Status: DISCONTINUED | OUTPATIENT
Start: 2024-06-26 | End: 2024-06-26

## 2024-06-26 RX ADMIN — POLYETHYLENE GLYCOL 3350 17 G: 17 POWDER, FOR SOLUTION ORAL at 10:33

## 2024-06-26 RX ADMIN — MORPHINE SULFATE 2 MG: 2 INJECTION, SOLUTION INTRAMUSCULAR; INTRAVENOUS at 17:57

## 2024-06-26 RX ADMIN — CEFEPIME 2 G: 1 INJECTION, SOLUTION INTRAVENOUS at 04:56

## 2024-06-26 RX ADMIN — HEPARIN SODIUM 5000 UNITS: 5000 INJECTION INTRAVENOUS; SUBCUTANEOUS at 14:15

## 2024-06-26 RX ADMIN — CEFEPIME 2 G: 1 INJECTION, SOLUTION INTRAVENOUS at 14:15

## 2024-06-26 RX ADMIN — METRONIDAZOLE 500 MG: 500 INJECTION, SOLUTION INTRAVENOUS at 17:56

## 2024-06-26 RX ADMIN — MORPHINE SULFATE 2 MG: 2 INJECTION, SOLUTION INTRAMUSCULAR; INTRAVENOUS at 16:03

## 2024-06-26 RX ADMIN — EZETIMIBE 10 MG: 10 TABLET ORAL at 10:27

## 2024-06-26 RX ADMIN — METRONIDAZOLE 500 MG: 500 INJECTION, SOLUTION INTRAVENOUS at 10:27

## 2024-06-26 RX ADMIN — POLYETHYLENE GLYCOL 3350 17 G: 17 POWDER, FOR SOLUTION ORAL at 10:31

## 2024-06-26 RX ADMIN — ROSUVASTATIN CALCIUM 20 MG: 20 TABLET, FILM COATED ORAL at 10:28

## 2024-06-26 RX ADMIN — METRONIDAZOLE 500 MG: 500 INJECTION, SOLUTION INTRAVENOUS at 01:18

## 2024-06-26 RX ADMIN — ONDANSETRON HYDROCHLORIDE 4 MG: 4 TABLET, FILM COATED ORAL at 16:00

## 2024-06-26 SDOH — HEALTH STABILITY: MENTAL HEALTH: CURRENT SMOKER: 1

## 2024-06-26 ASSESSMENT — PAIN SCALES - GENERAL
PAINLEVEL_OUTOF10: 5 - MODERATE PAIN
PAINLEVEL_OUTOF10: 4
PAINLEVEL_OUTOF10: 3
PAINLEVEL_OUTOF10: 1

## 2024-06-26 ASSESSMENT — ACTIVITIES OF DAILY LIVING (ADL): LACK_OF_TRANSPORTATION: NO

## 2024-06-26 ASSESSMENT — PAIN - FUNCTIONAL ASSESSMENT: PAIN_FUNCTIONAL_ASSESSMENT: 0-10

## 2024-06-26 NOTE — PROGRESS NOTES
Patient: Jatinder Keenan Age: 63 y.o.   Gender: male Room/bed: 122/122-A     Attending: Sushila Ferguson MD  Code Status:  Full Code    Subjective  No OVN. No fever or new complaints.      Objective:  Physical Exam  Constitutional:       General: He is not in acute distress.     Appearance: Normal appearance.   HENT:      Head: Normocephalic and atraumatic.      Right Ear: Tympanic membrane, ear canal and external ear normal.      Left Ear: Tympanic membrane, ear canal and external ear normal.      Nose: Nose normal.      Mouth/Throat:      Mouth: Mucous membranes are moist.      Pharynx: Oropharynx is clear.   Eyes:      General: No scleral icterus.     Extraocular Movements: Extraocular movements intact.      Conjunctiva/sclera: Conjunctivae normal.      Pupils: Pupils are equal, round, and reactive to light.   Cardiovascular:      Rate and Rhythm: Normal rate and regular rhythm.      Pulses: Normal pulses.      Heart sounds: Normal heart sounds. No murmur heard.     No gallop.   Pulmonary:      Effort: Pulmonary effort is normal.      Breath sounds: Normal breath sounds.   Abdominal:      General: Abdomen is flat. Bowel sounds are normal.      Palpations: Abdomen is soft.      Tenderness: There is abdominal tenderness. There is no guarding or rebound.   Musculoskeletal:         General: Normal range of motion.      Cervical back: Normal range of motion and neck supple.      Right lower leg: No edema.      Left lower leg: No edema.   Skin:     General: Skin is warm and dry.      Capillary Refill: Capillary refill takes less than 2 seconds.      Coloration: Skin is not jaundiced or pale.      Findings: No bruising, erythema, lesion or rash.   Neurological:      General: No focal deficit present.      Mental Status: He is alert and oriented to person, place, and time. Mental status is at baseline.      Deep Tendon Reflexes: Reflexes normal.   Psychiatric:         Mood and Affect: Mood normal.         Behavior:  Behavior normal.          Temp:  [36.1 °C (97 °F)-36.7 °C (98.1 °F)] 36.5 °C (97.7 °F)  Heart Rate:  [57-69] 69  Resp:  [16-18] 17  BP: (114-134)/(54-81) 114/68      Intake/Output Summary (Last 24 hours) at 6/26/2024 1224  Last data filed at 6/26/2024 0908  Gross per 24 hour   Intake 960 ml   Output --   Net 960 ml       Vitals:    06/25/24 0958   Weight: 74.8 kg (165 lb)             I/Os    Intake/Output Summary (Last 24 hours) at 6/26/2024 1224  Last data filed at 6/26/2024 0908  Gross per 24 hour   Intake 960 ml   Output --   Net 960 ml       Labs:   CBC:  Recent Labs     06/26/24  0500 06/25/24  0646 06/24/24  0525   WBC 8.4 9.8 13.8*   HGB 12.7* 12.1* 12.8*   HCT 37.7* 36.6* 39.1*    335 279   MCV 97 98 100     CMP:  Recent Labs     06/26/24  0500 06/25/24  0646 06/23/24  1744 06/23/24  1633    136  --  135*   K 5.1 4.1 3.8 6.2*    101  --  97*   CO2 27 28  --  21   ANIONGAP 13 11  --  23*   BUN 10 11  --  14   CREATININE 0.77 0.82  --  0.82   EGFR >90 >90  --  >90   GLUCOSE 80 88  --  114*     Recent Labs     06/26/24  0500 06/25/24  0646 06/23/24  1633 06/16/21  0737 02/23/21  1108   ALBUMIN 3.2* 3.1* 3.6   < > 4.3   ALKPHOS 45  --  53  --  75   ALT 16  --  20  --  27   AST 19  --  34  --  14   BILITOT 0.4  --  0.5  --  0.7    < > = values in this interval not displayed.     Calcium/Phos:   Lab Results   Component Value Date    CALCIUM 8.7 06/26/2024    PHOS 3.2 06/25/2024      COAG:   Recent Labs     06/24/24  0525 04/29/18  2030 02/02/18  1330   INR 1.4* 1.1 1.0     CRP:   Lab Results   Component Value Date    CRP 12.31 (H) 06/23/2024      [unfilled]   ENDO:  Recent Labs     04/30/18  0622   HGBA1C 6.4      CARDIAC:   Recent Labs     04/30/18  0622   BNP 10     Recent Labs     06/20/22  0930 06/16/21  0737 07/17/19  1221   CHOL 82 99 93   LDLF 30 43 32   HDL 42.5 36.4* 30.1*   TRIG 46 96 153*     No data recorded    Micro/ID:   No results found for the last 90 days.                "    No lab exists for component: \"AGALPCRNB\"   .ID  Lab Results   Component Value Date    BLOODCULT No growth at 2 days 06/23/2024    BLOODCULT No growth at 2 days 06/23/2024       Images:  CT guided aspiration of abscess, hematoma, cyst  Narrative: Interpreted By:  Vaughn Mccarthy,   STUDY:  CT GUIDED ASPIRATION OF ABSCESS, HEMATOMA, CYST;  6/25/2024 10:55 am      INDICATION:  Signs/Symptoms:Intra-abdominal abscess drain.      COMPARISON:  CT abdomen from 06/23/2024      ACCESSION NUMBER(S):  [id number]      ORDERING CLINICIAN:  PORTER ROSE      TECHNIQUE:      CONSENT:  The patient/patient's POA/next of kin was informed of the nature of  the proposed procedure. The purposes, alternatives, risks, and  benefits were explained and discussed. All questions were answered  and consent was obtained.      RADIATION EXPOSURE:      SEDATION:  Moderate conscious IV sedation services (supervision of  administration, induction, and maintenance) were provided by the  physician performing the procedure with intravenous fentanyl 100 mcg  and versed 1 mg for 20 minutes. The physician was assisted by an  independent trained observer, an interventional radiology nurse, in  the continuous monitoring of patient level of consciousness and  physiologic status.      MEDICATION/CONTRAST:  No additional      TIME OUT:  A time out was performed immediately prior to procedure start with  the interventional team, correctly identifying the patient name, date  of birth, MRN, procedure, anatomy (including marking of site and  side), patient position, procedure consent form, relevant laboratory  and imaging test results, antibiotic administration, safety  precautions, and procedure-specific equipment needs.      COMPLICATIONS:  No immediate adverse events identified.      FINDINGS:  Limited axial CT images were obtained through the abdomen at 5 mm  slice thickness. The images demonstrated  air pocket adjacent to the  sigmoid colon with " minimal amount of fluid but no visible air-fluid  levels. There has been mild interval improvement in pericolonic fat  stranding and sigmoid wall edema/thickening when compared to previous  exam. No free air is seen to suggest perforation.. The patient was  placed on supine positioning and prepped in the usual sterile manner.  1% lidocaine was used for local anesthesia at the planned skin  insertion site.      Under direct CT guidance, a 5 [place] Yueh needle/catheter was placed  into the collection via  midline anterior abdominal wall approach.  After confirmation of position of the needle within the collection,  the inner needle/stylette was withdrawn. The content was aspirated  and demonstrates purulent fluid.  Subsequently, 10 mL of Omnipaque  350 contrast material was injected into the pocket. The contrast  material freely drains into the sigmoid colon. Given these findings  the Yueh catheter was removed and no drain was placed in the  collection.      Postprocedure images demonstrated no evidence of hemorrhage.      Patient tolerated the procedure without immediate complication. Fluid  was sent to the laboratory for further analysis.      Impression: Successful CT-guided aspiration of fluid collection in the  perisigmoid region as above.      Signed by: Vaughn Mccarthy 6/25/2024 11:22 AM  Dictation workstation:   [id number]       Medications:  Scheduled medications  [Held by provider] aspirin, 81 mg, oral, Daily  bisacodyl, 10 mg, rectal, Daily  cefepime, 2 g, intravenous, q8h  ezetimibe, 10 mg, oral, Daily  [Held by provider] heparin (porcine), 5,000 Units, subcutaneous, q8h  metoprolol succinate XL, 25 mg, oral, Daily  metroNIDAZOLE, 500 mg, intravenous, q8h  polyethylene glycol, 17 g, oral, Daily  rosuvastatin, 20 mg, oral, Daily      Continuous medications       PRN medications  PRN medications: morphine, morphine, nitroglycerin, ondansetron **OR** ondansetron     Assessment and Plan:  Jatinder Keenan  is a 63 y.o. male presenting for acute diverticulitis with perforation and wall abscess      Assessment:  #acute diverticulitis with perforation and abscess in the wall of sigmoid colon sp Ct guided aspiration, blood cxs NTD. Aspiration cx with gram neg rods      Recommendations:   -continue cefepime and flagyl. Augmentin for 10 days at dc   -IS      Curt Smith DO   PGY-2, IM         Attending note : the patient was evaluated, the note was reviewed and updated  Sigmoid diverticulitis / abscess, sp ct drainage, free communication to colon cavity  Recommendations :  Continue Cefepime and Flagyl  Discussed with the medical team     Andressa Chery M.D

## 2024-06-26 NOTE — CARE PLAN
"The patient's goals for the shift include  \"to advance my diet and go home.\"    The clinical goals for the shift include Pt will have controlled pain throughout shift.    Problem: Pain - Adult  Goal: Verbalizes/displays adequate comfort level or baseline comfort level  Outcome: Progressing  Flowsheets (Taken 6/26/2024 1258)  Verbalizes/displays adequate comfort level or baseline comfort level:   Implement non-pharmacological measures as appropriate and evaluate response   Encourage patient to monitor pain and request assistance   Assess pain using appropriate pain scale     Problem: Safety - Adult  Goal: Free from fall injury  Outcome: Met    Problem: Discharge Planning  Goal: Discharge to home or other facility with appropriate resources  Outcome: Progressing  Flowsheets (Taken 6/26/2024 1258)  Discharge to home or other facility with appropriate resources:   Identify barriers to discharge with patient and caregiver   Identify discharge learning needs (meds, wound care, etc)    Problem: Chronic Conditions and Co-morbidities  Goal: Patient's chronic conditions and co-morbidity symptoms are monitored and maintained or improved  Outcome: Progressing     Problem: Infection related to problem list condition  Goal: Infection will resolve through treatment  Outcome: Progressing  Flowsheets (Taken 6/26/2024 1258)  Infection will resolve through treatment: Nurse will administer medications as ordered by provider           "

## 2024-06-26 NOTE — CARE PLAN
The patient's goals for the shift include  have a bowel movement, no pain    The clinical goals for the shift include Pt will have bowel movement this shift      Problem: Pain - Adult  Goal: Verbalizes/displays adequate comfort level or baseline comfort level  Outcome: Progressing     Problem: Safety - Adult  Goal: Free from fall injury  Outcome: Progressing     Problem: Discharge Planning  Goal: Discharge to home or other facility with appropriate resources  Outcome: Progressing     Problem: Chronic Conditions and Co-morbidities  Goal: Patient's chronic conditions and co-morbidity symptoms are monitored and maintained or improved  Outcome: Progressing     Problem: Respiratory  Goal: Clear secretions with interventions this shift  Outcome: Progressing  Goal: Minimize anxiety/maximize coping throughout shift  Outcome: Progressing  Goal: Minimal/no exertional discomfort or dyspnea this shift  Outcome: Progressing  Goal: No signs of respiratory distress (eg. Use of accessory muscles. Peds grunting)  Outcome: Progressing  Goal: Patent airway maintained this shift  Outcome: Progressing  Goal: Tolerate mechanical ventilation evidenced by VS/agitation level this shift  Outcome: Progressing  Goal: Tolerate pulmonary toileting this shift  Outcome: Progressing  Goal: Verbalize decreased shortness of breath this shift  Outcome: Progressing  Goal: Wean oxygen to maintain O2 saturation per order/standard this shift  Outcome: Progressing  Goal: Increase self care and/or family involvement in next 24 hours  Outcome: Progressing     Problem: Infection related to problem list condition  Goal: Infection will resolve through treatment  Outcome: Progressing

## 2024-06-26 NOTE — PROGRESS NOTES
"Jatinder Keenan is a 63 y.o. male on day 3 of admission presenting with Diverticulitis of colon with perforation.    Subjective   Pt says her abdominal pain has improved.        Objective     Physical Exam  Vitals and nursing note reviewed.   Constitutional:       General: He is not in acute distress.     Appearance: Normal appearance. He is not ill-appearing or toxic-appearing.   HENT:      Head: Normocephalic and atraumatic.      Nose: Nose normal.      Mouth/Throat:      Mouth: Mucous membranes are moist.   Eyes:      Extraocular Movements: Extraocular movements intact.      Conjunctiva/sclera: Conjunctivae normal.      Pupils: Pupils are equal, round, and reactive to light.   Cardiovascular:      Rate and Rhythm: Normal rate and regular rhythm.      Heart sounds: No murmur heard.     No gallop.   Pulmonary:      Effort: Pulmonary effort is normal. No respiratory distress.      Breath sounds: Normal breath sounds. No wheezing, rhonchi or rales.   Abdominal:      General: Abdomen is flat. Bowel sounds are normal. There is no distension.      Palpations: Abdomen is soft. There is no mass.      Tenderness: There is no abdominal tenderness.   Musculoskeletal:         General: No swelling or tenderness. Normal range of motion.      Cervical back: Normal range of motion and neck supple.   Skin:     General: Skin is warm and dry.   Neurological:      General: No focal deficit present.      Mental Status: He is alert and oriented to person, place, and time. Mental status is at baseline.   Psychiatric:         Mood and Affect: Mood normal.         Behavior: Behavior normal.         Thought Content: Thought content normal.         Judgment: Judgment normal.         Last Recorded Vitals:  /68 (BP Location: Left arm, Patient Position: Sitting)   Pulse 69   Temp 36.5 °C (97.7 °F) (Temporal)   Resp 17   Ht 1.702 m (5' 7\")   Wt 74.8 kg (165 lb)   SpO2 93%   BMI 25.84 kg/m²      Scheduled medications:  [Held " by provider] aspirin, 81 mg, oral, Daily  bisacodyl, 10 mg, rectal, Daily  cefepime, 2 g, intravenous, q8h  ezetimibe, 10 mg, oral, Daily  [Held by provider] heparin (porcine), 5,000 Units, subcutaneous, q8h  metoprolol succinate XL, 25 mg, oral, Daily  metroNIDAZOLE, 500 mg, intravenous, q8h  polyethylene glycol, 17 g, oral, Daily  rosuvastatin, 20 mg, oral, Daily      Continuous medications:       PRN medications:  PRN medications: morphine, morphine, nitroglycerin, ondansetron **OR** ondansetron     Relevant Results:  Results for orders placed or performed during the hospital encounter of 06/23/24 (from the past 24 hour(s))   Comprehensive Metabolic Panel   Result Value Ref Range    Glucose 80 74 - 99 mg/dL    Sodium 137 136 - 145 mmol/L    Potassium 5.1 3.5 - 5.3 mmol/L    Chloride 102 98 - 107 mmol/L    Bicarbonate 27 21 - 32 mmol/L    Anion Gap 13 10 - 20 mmol/L    Urea Nitrogen 10 6 - 23 mg/dL    Creatinine 0.77 0.50 - 1.30 mg/dL    eGFR >90 >60 mL/min/1.73m*2    Calcium 8.7 8.6 - 10.3 mg/dL    Albumin 3.2 (L) 3.4 - 5.0 g/dL    Alkaline Phosphatase 45 33 - 136 U/L    Total Protein 6.5 6.4 - 8.2 g/dL    AST 19 9 - 39 U/L    Bilirubin, Total 0.4 0.0 - 1.2 mg/dL    ALT 16 10 - 52 U/L   CBC and Auto Differential   Result Value Ref Range    WBC 8.4 4.4 - 11.3 x10*3/uL    nRBC 0.0 0.0 - 0.0 /100 WBCs    RBC 3.88 (L) 4.50 - 5.90 x10*6/uL    Hemoglobin 12.7 (L) 13.5 - 17.5 g/dL    Hematocrit 37.7 (L) 41.0 - 52.0 %    MCV 97 80 - 100 fL    MCH 32.7 26.0 - 34.0 pg    MCHC 33.7 32.0 - 36.0 g/dL    RDW 11.1 (L) 11.5 - 14.5 %    Platelets 366 150 - 450 x10*3/uL    Neutrophils % 56.9 40.0 - 80.0 %    Immature Granulocytes %, Automated 0.7 0.0 - 0.9 %    Lymphocytes % 29.4 13.0 - 44.0 %    Monocytes % 8.8 2.0 - 10.0 %    Eosinophils % 3.6 0.0 - 6.0 %    Basophils % 0.6 0.0 - 2.0 %    Neutrophils Absolute 4.78 1.20 - 7.70 x10*3/uL    Immature Granulocytes Absolute, Automated 0.06 0.00 - 0.70 x10*3/uL    Lymphocytes Absolute  2.47 1.20 - 4.80 x10*3/uL    Monocytes Absolute 0.74 0.10 - 1.00 x10*3/uL    Eosinophils Absolute 0.30 0.00 - 0.70 x10*3/uL    Basophils Absolute 0.05 0.00 - 0.10 x10*3/uL       CT guided aspiration of abscess, hematoma, cyst    Result Date: 6/25/2024  Interpreted By:  Vaughn Mccarthy, STUDY: CT GUIDED ASPIRATION OF ABSCESS, HEMATOMA, CYST;  6/25/2024 10:55 am   INDICATION: Signs/Symptoms:Intra-abdominal abscess drain.   COMPARISON: CT abdomen from 06/23/2024   ACCESSION NUMBER(S): [id number]   ORDERING CLINICIAN: PORTER ROSE   TECHNIQUE:   CONSENT: The patient/patient's POA/next of kin was informed of the nature of the proposed procedure. The purposes, alternatives, risks, and benefits were explained and discussed. All questions were answered and consent was obtained.   RADIATION EXPOSURE:   SEDATION: Moderate conscious IV sedation services (supervision of administration, induction, and maintenance) were provided by the physician performing the procedure with intravenous fentanyl 100 mcg and versed 1 mg for 20 minutes. The physician was assisted by an independent trained observer, an interventional radiology nurse, in the continuous monitoring of patient level of consciousness and physiologic status.   MEDICATION/CONTRAST: No additional   TIME OUT: A time out was performed immediately prior to procedure start with the interventional team, correctly identifying the patient name, date of birth, MRN, procedure, anatomy (including marking of site and side), patient position, procedure consent form, relevant laboratory and imaging test results, antibiotic administration, safety precautions, and procedure-specific equipment needs.   COMPLICATIONS: No immediate adverse events identified.   FINDINGS: Limited axial CT images were obtained through the abdomen at 5 mm slice thickness. The images demonstrated  air pocket adjacent to the sigmoid colon with minimal amount of fluid but no visible air-fluid levels. There has  been mild interval improvement in pericolonic fat stranding and sigmoid wall edema/thickening when compared to previous exam. No free air is seen to suggest perforation.. The patient was placed on supine positioning and prepped in the usual sterile manner. 1% lidocaine was used for local anesthesia at the planned skin insertion site.   Under direct CT guidance, a 5 [place] Yueh needle/catheter was placed into the collection via  midline anterior abdominal wall approach. After confirmation of position of the needle within the collection, the inner needle/stylette was withdrawn. The content was aspirated and demonstrates purulent fluid.  Subsequently, 10 mL of Omnipaque 350 contrast material was injected into the pocket. The contrast material freely drains into the sigmoid colon. Given these findings the Yueh catheter was removed and no drain was placed in the collection.   Postprocedure images demonstrated no evidence of hemorrhage.   Patient tolerated the procedure without immediate complication. Fluid was sent to the laboratory for further analysis.       Successful CT-guided aspiration of fluid collection in the perisigmoid region as above.   Signed by: Vaughn Mccarthy 6/25/2024 11:22 AM Dictation workstation:   [id number]           Assessment/Plan   Principal Problem:    Diverticulitis of colon with perforation  Active Problems:    Diverticulitis of large intestine with abscess without bleeding    Jatinder Keenan is a 63 y.o. male Ex smoker with a history of CAD s/p MI and PCI to RCA x 2 (2016), HTN, HLD, remote PE, and CVA (not on Eliquis)  who presented to the hospital for constipation and abdominal pain and was found to have acute diverticulitis with perforation     Acute diverticulitis with perforation/sigmoid abscess sp I and D 6/25  - surg following  - cefepime and Flagyl  - ID following  - IVF  - morphine/toradol PRN for pain    Normocytic anemia  - monitor    Leukocytosis  - resolved    CAD  -  crestor  - zetia  - hold ASA    HTN  BP low  - hold metoprolol     DVT prophylaxis: SCD, can resume SQ hep  Dispo: monitor clinically       Sushila Ferguson MD  Hospitalist

## 2024-06-26 NOTE — PROGRESS NOTES
Jatinder Keenan is a 63 y.o. male on day 3 of admission presenting with Diverticulitis of colon with perforation.    Subjective   Pt is day 3 of hospitalization for diverticulitis with intramural abscess.   He has been receiving IV ABX. Receiving IV flagyl and cefepime.    No acute overnight events.   His pain is less, no pain medicine since last evening  and in the middle of the night to help him with pain overnight to help him sleep.     Nausea is improving. No vomiting.     Tolerating liquid diet.     Has been ambulating without diff.     Did have BM today. Some inc pain to RLQ with BM.          Objective     Physical Exam  Vitals reviewed.   Constitutional:       General: He is not in acute distress.     Appearance: Normal appearance. He is normal weight. He is not ill-appearing, toxic-appearing or diaphoretic.   HENT:      Head: Normocephalic and atraumatic.   Eyes:      General: No scleral icterus.        Right eye: No discharge.         Left eye: No discharge.      Conjunctiva/sclera: Conjunctivae normal.   Cardiovascular:      Rate and Rhythm: Normal rate and regular rhythm.      Pulses: Normal pulses.      Heart sounds: Normal heart sounds. No murmur heard.     No friction rub. No gallop.   Pulmonary:      Effort: Pulmonary effort is normal. No respiratory distress.      Breath sounds: Normal breath sounds. No stridor. No wheezing, rhonchi or rales.   Chest:      Chest wall: No tenderness.   Abdominal:      Comments:   Abd distended, + BS x 4 q.    Musculoskeletal:      Right lower leg: No edema.      Left lower leg: No edema.   Skin:     General: Skin is warm and dry.   Neurological:      Mental Status: He is alert and oriented to person, place, and time.      Motor: No weakness.      Gait: Gait normal.   Psychiatric:         Mood and Affect: Mood normal.         Behavior: Behavior normal.         Thought Content: Thought content normal.         Judgment: Judgment normal.         Last Recorded  "Vitals  Blood pressure 114/68, pulse 69, temperature 36.5 °C (97.7 °F), temperature source Temporal, resp. rate 17, height 1.702 m (5' 7\"), weight 74.8 kg (165 lb), SpO2 93%.  At time of exam HR 68 and pulse ox 95%    Intake/Output last 3 Shifts:  I/O last 3 completed shifts:  In: 4547.5 (60.8 mL/kg) [P.O.:900; I.V.:2647.5 (35.4 mL/kg); IV Piggyback:1000]  Out: - (0 mL/kg)   Weight: 74.8 kg     Relevant Results      CT guided aspiration of abscess, hematoma, cyst    Result Date: 6/25/2024  Interpreted By:  Vaughn Mccarthy, STUDY: CT GUIDED ASPIRATION OF ABSCESS, HEMATOMA, CYST;  6/25/2024 10:55 am   INDICATION: Signs/Symptoms:Intra-abdominal abscess drain.   COMPARISON: CT abdomen from 06/23/2024   ACCESSION NUMBER(S): [id number]   ORDERING CLINICIAN: PORTER ROSE   TECHNIQUE:   CONSENT: The patient/patient's POA/next of kin was informed of the nature of the proposed procedure. The purposes, alternatives, risks, and benefits were explained and discussed. All questions were answered and consent was obtained.   RADIATION EXPOSURE:   SEDATION: Moderate conscious IV sedation services (supervision of administration, induction, and maintenance) were provided by the physician performing the procedure with intravenous fentanyl 100 mcg and versed 1 mg for 20 minutes. The physician was assisted by an independent trained observer, an interventional radiology nurse, in the continuous monitoring of patient level of consciousness and physiologic status.   MEDICATION/CONTRAST: No additional   TIME OUT: A time out was performed immediately prior to procedure start with the interventional team, correctly identifying the patient name, date of birth, MRN, procedure, anatomy (including marking of site and side), patient position, procedure consent form, relevant laboratory and imaging test results, antibiotic administration, safety precautions, and procedure-specific equipment needs.   COMPLICATIONS: No immediate adverse events " identified.   FINDINGS: Limited axial CT images were obtained through the abdomen at 5 mm slice thickness. The images demonstrated  air pocket adjacent to the sigmoid colon with minimal amount of fluid but no visible air-fluid levels. There has been mild interval improvement in pericolonic fat stranding and sigmoid wall edema/thickening when compared to previous exam. No free air is seen to suggest perforation.. The patient was placed on supine positioning and prepped in the usual sterile manner. 1% lidocaine was used for local anesthesia at the planned skin insertion site.   Under direct CT guidance, a 5 Y[place] Yueh needle/catheter was placed into the collection via  midline anterior abdominal wall approach. After confirmation of position of the needle within the collection, the inner needle/stylette was withdrawn. The content was aspirated and demonstrates purulent fluid.  Subsequently, 10 mL of Omnipaque 350 contrast material was injected into the pocket. The contrast material freely drains into the sigmoid colon. Given these findings the Yueh catheter was removed and no drain was placed in the collection.   Postprocedure images demonstrated no evidence of hemorrhage.   Patient tolerated the procedure without immediate complication. Fluid was sent to the laboratory for further analysis.       Successful CT-guided aspiration of fluid collection in the perisigmoid region as above.   Signed by: Vaughn Mccarthy 6/25/2024 11:22 AM Dictation workstation:   [id number]    CT abdomen pelvis w IV contrast    Result Date: 6/23/2024  Interpreted By:  Flavio Miranda, STUDY: CT ABDOMEN PELVIS W IV CONTRAST;  6/23/2024 6:28 pm   INDICATION: Signs/Symptoms:Constipation for 2 weeks.  Pain to bilateral lower quadrants.   COMPARISON: None.   ACCESSION NUMBER(S): [id number]   ORDERING CLINICIAN: AYAAN SHEETS   TECHNIQUE: Contiguous axial images of the abdomen and pelvis were obtained after the intravenous administration of   contrast. Coronal and sagittal reformatted images were obtained from the axial images.   FINDINGS: There is limited evaluation of the lung bases. Mild basilar atelectasis. Coronary artery atherosclerotic calcifications.   2.8 cm cyst in the left hepatic lobe and several subcentimeter hepatic hypodensities which are too small to characterize. The gallbladder is present. No dilatation common bile duct.   The pancreas, spleen, and adrenal glands appear unremarkable.   Symmetric enhancement of the kidneys. No hydronephrosis.   No evidence of bowel obstruction or acute appendicitis. Fluid-filled segments of colon. There is colonic diverticulosis with prominent wall thickening and edema of the surrounding the sigmoid colon with findings compatible with colonic perforation and there is a 3.7 x 2.8 cm abscess involving the wall of the sigmoid colon. There prominent lymph nodes superior to sigmoid colon which measure up to 1.6 cm.   Urinary bladder is underdistended and not well evaluated.   Multilevel degenerative change of the lumbar spine.       Colonic diverticulosis with prominent wall thickening and edema of the sigmoid colon compatible with acute diverticulitis and evidence of component of perforation with prominent surrounding edema and a 3.7 cm x 2.8 cm abscess in the wall of the sigmoid colon. Prominent and enlarged lymph nodes superior to the sigmoid colon measure up to 1.6 cm.   Follow-up colonoscopy is recommended after treatment to exclude other underlying pathology including mass/malignancy.   MACRO: None   Signed by: Flavio Miranda 6/23/2024 7:35 PM Dictation workstation:   [id number]    Scheduled medications  aspirin, 81 mg, oral, Daily  bisacodyl, 10 mg, rectal, Daily  cefepime, 2 g, intravenous, q8h  ezetimibe, 10 mg, oral, Daily  heparin (porcine), 5,000 Units, subcutaneous, q8h  metoprolol succinate XL, 25 mg, oral, Daily  metroNIDAZOLE, 500 mg, intravenous, q8h  polyethylene glycol, 17 g, oral,  Daily  rosuvastatin, 20 mg, oral, Daily      Continuous medications     PRN medications  PRN medications: morphine, morphine, nitroglycerin, ondansetron **OR** ondansetron  Results for orders placed or performed during the hospital encounter of 06/23/24 (from the past 24 hour(s))   Comprehensive Metabolic Panel   Result Value Ref Range    Glucose 80 74 - 99 mg/dL    Sodium 137 136 - 145 mmol/L    Potassium 5.1 3.5 - 5.3 mmol/L    Chloride 102 98 - 107 mmol/L    Bicarbonate 27 21 - 32 mmol/L    Anion Gap 13 10 - 20 mmol/L    Urea Nitrogen 10 6 - 23 mg/dL    Creatinine 0.77 0.50 - 1.30 mg/dL    eGFR >90 >60 mL/min/1.73m*2    Calcium 8.7 8.6 - 10.3 mg/dL    Albumin 3.2 (L) 3.4 - 5.0 g/dL    Alkaline Phosphatase 45 33 - 136 U/L    Total Protein 6.5 6.4 - 8.2 g/dL    AST 19 9 - 39 U/L    Bilirubin, Total 0.4 0.0 - 1.2 mg/dL    ALT 16 10 - 52 U/L   CBC and Auto Differential   Result Value Ref Range    WBC 8.4 4.4 - 11.3 x10*3/uL    nRBC 0.0 0.0 - 0.0 /100 WBCs    RBC 3.88 (L) 4.50 - 5.90 x10*6/uL    Hemoglobin 12.7 (L) 13.5 - 17.5 g/dL    Hematocrit 37.7 (L) 41.0 - 52.0 %    MCV 97 80 - 100 fL    MCH 32.7 26.0 - 34.0 pg    MCHC 33.7 32.0 - 36.0 g/dL    RDW 11.1 (L) 11.5 - 14.5 %    Platelets 366 150 - 450 x10*3/uL    Neutrophils % 56.9 40.0 - 80.0 %    Immature Granulocytes %, Automated 0.7 0.0 - 0.9 %    Lymphocytes % 29.4 13.0 - 44.0 %    Monocytes % 8.8 2.0 - 10.0 %    Eosinophils % 3.6 0.0 - 6.0 %    Basophils % 0.6 0.0 - 2.0 %    Neutrophils Absolute 4.78 1.20 - 7.70 x10*3/uL    Immature Granulocytes Absolute, Automated 0.06 0.00 - 0.70 x10*3/uL    Lymphocytes Absolute 2.47 1.20 - 4.80 x10*3/uL    Monocytes Absolute 0.74 0.10 - 1.00 x10*3/uL    Eosinophils Absolute 0.30 0.00 - 0.70 x10*3/uL    Basophils Absolute 0.05 0.00 - 0.10 x10*3/uL                            Assessment/Plan   Principal Problem:    Diverticulitis of colon with perforation  Active Problems:    Diverticulitis of large intestine with abscess  without bleeding    Pt is day 3 of hospital admission for diverticulitis with abscess  - reviewed labs and imaging: anemia stable, WBC count normal.  - pain is improving, cont pain medicine as ordered PRN.   - nausea is improving, cont zofran as ordered PRN.  - advance diet to soft low residue diet.  - consult dietitian.  - cont IV ABX.  - activity as tolerated.  - remainder of care per primary.     Tobacco use disorder  - enc pt to quit smoking, he is interested in quitting.  - declines patches or gum at this time.  - time spent counseling 3 minutes.         Pt seen with Dr. Nunn. Plan of care discussed with Dr. Nunn.     Pebbles Webster, APRN-CNP

## 2024-06-26 NOTE — PROGRESS NOTES
06/26/24 0819   Discharge Planning   Living Arrangements Spouse/significant other   Support Systems Spouse/significant other   Assistance Needed A&OX3; independent with ADLs with no DME; drives; room air baseline and currently room air   Type of Residence Private residence   Number of Stairs to Enter Residence 2   Number of Stairs Within Residence 14   Do you have animals or pets at home? No   Who is requesting discharge planning? Provider   Patient expects to be discharged to: Patient denies any home going needs at this time   Financial Resource Strain   How hard is it for you to pay for the very basics like food, housing, medical care, and heating? Not hard   Housing Stability   In the last 12 months, was there a time when you were not able to pay the mortgage or rent on time? N   In the last 12 months, how many places have you lived? 1   In the last 12 months, was there a time when you did not have a steady place to sleep or slept in a shelter (including now)? N   Transportation Needs   In the past 12 months, has lack of transportation kept you from medical appointments or from getting medications? no   In the past 12 months, has lack of transportation kept you from meetings, work, or from getting things needed for daily living? No

## 2024-06-26 NOTE — ANESTHESIA PREPROCEDURE EVALUATION
Patient: Jatinder Keenan    Procedure Information       Date/Time: 06/26/24 2045    Procedure: Resection Sigmoid Colon    Location: [facility] / [facility]    Surgeons: Pallavi Nunn MD          Vitals:    06/26/24 1431   BP: 119/72   Pulse: 66   Resp: 16   Temp: 36.2 °C (97.2 °F)   SpO2: 95%       Past Surgical History:   Procedure Laterality Date    COLONOSCOPY  03/29/2017    Complete Colonoscopy    CORONARY ANGIOPLASTY WITH STENT PLACEMENT  02/06/2018    Cath Stent Placement    ESOPHAGOGASTRODUODENOSCOPY  03/29/2017    Diagnostic Esophagogastroduodenoscopy    HERNIA REPAIR  06/03/2013    Hernia Repair    LUNG SURGERY  09/28/2013    Lung Surgery    MR HEAD ANGIO WO IV CONTRAST  4/30/2018    MR HEAD ANGIO WO IV CONTRAST 4/30/2018 [facility]    MR NECK ANGIO WO IV CONTRAST  4/30/2018    MR NECK ANGIO WO IV CONTRAST 4/30/2018 [facility]    OTHER SURGICAL HISTORY  06/03/2013    Spinal Diskectomy Lumbar     Past Medical History:   Diagnosis Date    Acute maxillary sinusitis, unspecified 04/03/2015    Acute maxillary sinusitis    Body mass index (BMI) 37.0-37.9, adult 07/17/2019    BMI 37.0-37.9, adult    Chest pain on breathing 09/10/2015    Chest pain on respiration    Contact dermatitis due to poison ivy 06/22/2024    COVID-19 virus infection 03/16/2023    Laceration of left elbow 03/16/2023    Lumbago with sciatica, left side 07/19/2017    Acute left-sided low back pain with left-sided sciatica    Muscle pain 03/16/2023    Other specified abnormal findings of blood chemistry     Abnormal liver function test    Personal history of diseases of the skin and subcutaneous tissue 01/30/2014    History of folliculitis    Personal history of other diseases of the digestive system 03/29/2017    History of lower gastrointestinal bleeding    Personal history of other diseases of the digestive system 02/12/2018    History of gastrointestinal hemorrhage    Personal history of other specified  conditions 01/30/2014    History of headache    Personal history of other specified conditions 11/15/2018    History of epigastric pain    Personal history of pulmonary embolism 06/20/2022    History of pulmonary embolism    Polymyalgia rheumatica (Multi) 11/15/2018    Polymyalgia    Weakness 05/07/2018    Left-sided weakness       Current Facility-Administered Medications:     aspirin EC tablet 81 mg, 81 mg, oral, Daily, Sushila Ferguson MD, 81 mg at 06/24/24 0849    bisacodyl (Dulcolax) suppository 10 mg, 10 mg, rectal, Daily, Pallavi Nunn MD, 10 mg at 06/24/24 0901    cefepime (Maxipime) IV 2 g, 2 g, intravenous, q8h, Lisa Guerrero MD, Stopped at 06/26/24 1445    ezetimibe (Zetia) tablet 10 mg, 10 mg, oral, Daily, Lisa Guerrero MD, 10 mg at 06/26/24 1027    heparin (porcine) injection 5,000 Units, 5,000 Units, subcutaneous, q8h, Sushila Ferguson MD, 5,000 Units at 06/26/24 1415    metoprolol succinate XL (Toprol-XL) 24 hr tablet 25 mg, 25 mg, oral, Daily, Lisa Guerrero MD, 25 mg at 06/24/24 0849    metroNIDAZOLE (Flagyl) 500 mg in NaCl (iso-os) 100 mL, 500 mg, intravenous, q8h, Lisa Guerrero MD, Stopped at 06/26/24 1856    morphine injection 2 mg, 2 mg, intravenous, q4h PRN, Lisa Guerrero MD, 2 mg at 06/26/24 1603    morphine injection 4 mg, 4 mg, intravenous, q4h PRN, Lisa Guerrero MD, 4 mg at 06/25/24 0317    nitroglycerin (Nitrostat) SL tablet 0.4 mg, 0.4 mg, sublingual, q5 min PRN, Lisa Guerrero MD    ondansetron (Zofran) tablet 4 mg, 4 mg, oral, q8h PRN, 4 mg at 06/26/24 1600 **OR** ondansetron (Zofran) injection 4 mg, 4 mg, intravenous, q8h PRN, Lisa Guerrero MD, 4 mg at 06/25/24 0326    polyethylene glycol (Glycolax, Miralax) packet 17 g, 17 g, oral, Daily, Sushila Ferguson MD, 17 g at 06/26/24 1033    rosuvastatin (Crestor) tablet 20 mg, 20 mg, oral, Daily, Lisa Guerrero MD, 20 mg at 06/26/24 1028  Prior to Admission medications    Medication Sig Start Date End Date Taking?  Authorizing Provider   aspirin 81 mg EC tablet Take 1 tablet (81 mg) by mouth once daily. 9/28/13  Yes Historical Provider, MD   ezetimibe (Zetia) 10 mg tablet Take 1 tablet (10 mg) by mouth once daily. 12/22/21  Yes Historical Provider, MD   metoprolol succinate XL (Toprol-XL) 25 mg 24 hr tablet Take 1 tablet (25 mg) by mouth once daily. 5/7/18  Yes Historical Provider, MD   rosuvastatin (Crestor) 20 mg tablet Take 1 tablet (20 mg) by mouth once daily. 2/9/23  Yes Historical Provider, MD   magnesium citrate solution Take 296 mL by mouth 1 time for 1 dose. 6/22/24 6/22/24  Raza Sharma,    magnesium citrate solution Take 296 mL by mouth 1 time for 1 dose. 6/23/24 6/23/24  Raza Sharma,    nitroglycerin (Nitrostat) 0.4 mg SL tablet Place 1 tablet (0.4 mg) under the tongue every 5 minutes if needed. 4/12/13   Historical Provider, MD   apixaban (Eliquis) 5 mg tablet Take 1 tablet (5 mg) by mouth twice a day.  6/23/24  Historical Provider, MD     Allergies   Allergen Reactions    Atorvastatin Unknown     Social History     Tobacco Use    Smoking status: Some Days     Current packs/day: 0.25     Average packs/day: 0.3 packs/day for 15.0 years (3.8 ttl pk-yrs)     Types: Cigarettes    Smokeless tobacco: Never   Substance Use Topics    Alcohol use: Not on file         Chemistry    Lab Results   Component Value Date/Time     06/26/2024 0500    K 5.1 06/26/2024 0500     06/26/2024 0500    CO2 27 06/26/2024 0500    BUN 10 06/26/2024 0500    CREATININE 0.77 06/26/2024 0500    Lab Results   Component Value Date/Time    CALCIUM 8.7 06/26/2024 0500    ALKPHOS 45 06/26/2024 0500    AST 19 06/26/2024 0500    ALT 16 06/26/2024 0500    BILITOT 0.4 06/26/2024 0500          Lab Results   Component Value Date/Time    WBC 9.5 06/26/2024 1830    HGB 13.3 (L) 06/26/2024 1830    HCT 39.1 (L) 06/26/2024 1830     06/26/2024 1830     Lab Results   Component Value Date/Time    PROTIME 15.6 (H) 06/24/2024 0501     INR 1.4 (H) 06/24/2024 0525     No results found for this or any previous visit (from the past 4464 hour(s)).  No results found for this or any previous visit from the past 1095 days.    Relevant Problems   Cardiac   (+) Coronary artery disease   (+) Hyperlipidemia   (+) Hypertension      Pulmonary   (+) Chronic obstructive pulmonary disease (Multi)      GI   (+) GERD (gastroesophageal reflux disease)      Endocrine   (+) Diabetes mellitus, type 2 (Multi)      Musculoskeletal   (+) Degeneration of lumbar or lumbosacral intervertebral disc       Clinical information reviewed:    Allergies  Meds               NPO Detail:  NPO/Void Status  Date of Last Liquid: 06/17/24         Physical Exam    Airway  Mallampati: II     Cardiovascular - normal exam     Dental    Pulmonary    Abdominal            Anesthesia Plan    History of general anesthesia?: yes  History of complications of general anesthesia?: no    ASA 3 - emergent     general     The patient is a current smoker.  Patient was previously instructed to abstain from smoking on day of procedure.  Patient did not smoke on day of procedure.  Education provided regarding risk of obstructive sleep apnea.  intravenous induction   Anesthetic plan and risks discussed with patient.    Plan discussed with CRNA.

## 2024-06-27 ENCOUNTER — APPOINTMENT (OUTPATIENT)
Dept: RADIOLOGY | Facility: HOSPITAL | Age: 64
DRG: 331 | End: 2024-06-27
Payer: COMMERCIAL

## 2024-06-27 LAB
ALBUMIN SERPL BCP-MCNC: 3.2 G/DL (ref 3.4–5)
ALP SERPL-CCNC: 47 U/L (ref 33–136)
ALT SERPL W P-5'-P-CCNC: 17 U/L (ref 10–52)
ANION GAP SERPL CALC-SCNC: 13 MMOL/L (ref 10–20)
AST SERPL W P-5'-P-CCNC: 16 U/L (ref 9–39)
BASOPHILS # BLD AUTO: 0.04 X10*3/UL (ref 0–0.1)
BASOPHILS NFR BLD AUTO: 0.3 %
BILIRUB SERPL-MCNC: 0.4 MG/DL (ref 0–1.2)
BUN SERPL-MCNC: 10 MG/DL (ref 6–23)
CALCIUM SERPL-MCNC: 8.6 MG/DL (ref 8.6–10.3)
CHLORIDE SERPL-SCNC: 100 MMOL/L (ref 98–107)
CO2 SERPL-SCNC: 25 MMOL/L (ref 21–32)
CREAT SERPL-MCNC: 0.76 MG/DL (ref 0.5–1.3)
EGFRCR SERPLBLD CKD-EPI 2021: >90 ML/MIN/1.73M*2
EOSINOPHIL # BLD AUTO: 0 X10*3/UL (ref 0–0.7)
EOSINOPHIL NFR BLD AUTO: 0 %
ERYTHROCYTE [DISTWIDTH] IN BLOOD BY AUTOMATED COUNT: 11 % (ref 11.5–14.5)
GLUCOSE SERPL-MCNC: 136 MG/DL (ref 74–99)
HCT VFR BLD AUTO: 38.7 % (ref 41–52)
HGB BLD-MCNC: 13.1 G/DL (ref 13.5–17.5)
IMM GRANULOCYTES # BLD AUTO: 0.1 X10*3/UL (ref 0–0.7)
IMM GRANULOCYTES NFR BLD AUTO: 0.9 % (ref 0–0.9)
LYMPHOCYTES # BLD AUTO: 0.95 X10*3/UL (ref 1.2–4.8)
LYMPHOCYTES NFR BLD AUTO: 8.1 %
MCH RBC QN AUTO: 32.8 PG (ref 26–34)
MCHC RBC AUTO-ENTMCNC: 33.9 G/DL (ref 32–36)
MCV RBC AUTO: 97 FL (ref 80–100)
MONOCYTES # BLD AUTO: 0.36 X10*3/UL (ref 0.1–1)
MONOCYTES NFR BLD AUTO: 3.1 %
NEUTROPHILS # BLD AUTO: 10.28 X10*3/UL (ref 1.2–7.7)
NEUTROPHILS NFR BLD AUTO: 87.6 %
NRBC BLD-RTO: 0 /100 WBCS (ref 0–0)
PLATELET # BLD AUTO: 405 X10*3/UL (ref 150–450)
POTASSIUM SERPL-SCNC: 4.6 MMOL/L (ref 3.5–5.3)
PROT SERPL-MCNC: 6.3 G/DL (ref 6.4–8.2)
RBC # BLD AUTO: 4 X10*6/UL (ref 4.5–5.9)
SODIUM SERPL-SCNC: 133 MMOL/L (ref 136–145)
WBC # BLD AUTO: 11.7 X10*3/UL (ref 4.4–11.3)

## 2024-06-27 PROCEDURE — 99233 SBSQ HOSP IP/OBS HIGH 50: CPT | Performed by: STUDENT IN AN ORGANIZED HEALTH CARE EDUCATION/TRAINING PROGRAM

## 2024-06-27 PROCEDURE — 74018 RADEX ABDOMEN 1 VIEW: CPT

## 2024-06-27 PROCEDURE — 2500000004 HC RX 250 GENERAL PHARMACY W/ HCPCS (ALT 636 FOR OP/ED): Performed by: STUDENT IN AN ORGANIZED HEALTH CARE EDUCATION/TRAINING PROGRAM

## 2024-06-27 PROCEDURE — 80053 COMPREHEN METABOLIC PANEL: CPT | Performed by: SURGERY

## 2024-06-27 PROCEDURE — 2500000004 HC RX 250 GENERAL PHARMACY W/ HCPCS (ALT 636 FOR OP/ED): Performed by: SURGERY

## 2024-06-27 PROCEDURE — 36415 COLL VENOUS BLD VENIPUNCTURE: CPT | Performed by: SURGERY

## 2024-06-27 PROCEDURE — 85025 COMPLETE CBC W/AUTO DIFF WBC: CPT | Performed by: SURGERY

## 2024-06-27 PROCEDURE — 99232 SBSQ HOSP IP/OBS MODERATE 35: CPT | Performed by: NURSE PRACTITIONER

## 2024-06-27 PROCEDURE — 1100000001 HC PRIVATE ROOM DAILY

## 2024-06-27 PROCEDURE — 2500000001 HC RX 250 WO HCPCS SELF ADMINISTERED DRUGS (ALT 637 FOR MEDICARE OP): Performed by: SURGERY

## 2024-06-27 PROCEDURE — 2500000002 HC RX 250 W HCPCS SELF ADMINISTERED DRUGS (ALT 637 FOR MEDICARE OP, ALT 636 FOR OP/ED): Performed by: SURGERY

## 2024-06-27 PROCEDURE — 74018 RADEX ABDOMEN 1 VIEW: CPT | Performed by: RADIOLOGY

## 2024-06-27 RX ORDER — DEXTROSE MONOHYDRATE AND SODIUM CHLORIDE 5; .9 G/100ML; G/100ML
50 INJECTION, SOLUTION INTRAVENOUS CONTINUOUS
Status: ACTIVE | OUTPATIENT
Start: 2024-06-28

## 2024-06-27 RX ADMIN — HEPARIN SODIUM 5000 UNITS: 5000 INJECTION INTRAVENOUS; SUBCUTANEOUS at 06:08

## 2024-06-27 RX ADMIN — METOPROLOL SUCCINATE 25 MG: 25 TABLET, EXTENDED RELEASE ORAL at 08:46

## 2024-06-27 RX ADMIN — HYDROMORPHONE HYDROCHLORIDE 0.2 MG: 1 INJECTION, SOLUTION INTRAMUSCULAR; INTRAVENOUS; SUBCUTANEOUS at 15:34

## 2024-06-27 RX ADMIN — HYDROMORPHONE HYDROCHLORIDE 0.2 MG: 1 INJECTION, SOLUTION INTRAMUSCULAR; INTRAVENOUS; SUBCUTANEOUS at 21:16

## 2024-06-27 RX ADMIN — MORPHINE SULFATE 4 MG: 4 INJECTION INTRAVENOUS at 07:35

## 2024-06-27 RX ADMIN — MORPHINE SULFATE 4 MG: 4 INJECTION INTRAVENOUS at 00:25

## 2024-06-27 RX ADMIN — MORPHINE SULFATE 4 MG: 4 INJECTION INTRAVENOUS at 03:26

## 2024-06-27 RX ADMIN — MORPHINE SULFATE 4 MG: 4 INJECTION INTRAVENOUS at 11:58

## 2024-06-27 RX ADMIN — ONDANSETRON 4 MG: 2 INJECTION INTRAMUSCULAR; INTRAVENOUS at 15:33

## 2024-06-27 RX ADMIN — CEFEPIME 2 G: 1 INJECTION, SOLUTION INTRAVENOUS at 06:07

## 2024-06-27 RX ADMIN — EZETIMIBE 10 MG: 10 TABLET ORAL at 08:46

## 2024-06-27 RX ADMIN — METRONIDAZOLE 500 MG: 500 INJECTION, SOLUTION INTRAVENOUS at 18:30

## 2024-06-27 RX ADMIN — METRONIDAZOLE 500 MG: 500 INJECTION, SOLUTION INTRAVENOUS at 10:25

## 2024-06-27 RX ADMIN — CEFEPIME 2 G: 1 INJECTION, SOLUTION INTRAVENOUS at 12:31

## 2024-06-27 RX ADMIN — ROSUVASTATIN CALCIUM 20 MG: 20 TABLET, FILM COATED ORAL at 08:46

## 2024-06-27 RX ADMIN — POLYETHYLENE GLYCOL 3350 17 G: 17 POWDER, FOR SOLUTION ORAL at 08:46

## 2024-06-27 RX ADMIN — MORPHINE SULFATE 4 MG: 4 INJECTION INTRAVENOUS at 16:55

## 2024-06-27 RX ADMIN — METRONIDAZOLE 500 MG: 500 INJECTION, SOLUTION INTRAVENOUS at 01:30

## 2024-06-27 RX ADMIN — HYDROMORPHONE HYDROCHLORIDE 0.2 MG: 1 INJECTION, SOLUTION INTRAMUSCULAR; INTRAVENOUS; SUBCUTANEOUS at 10:54

## 2024-06-27 RX ADMIN — CEFEPIME 2 G: 1 INJECTION, SOLUTION INTRAVENOUS at 21:13

## 2024-06-27 ASSESSMENT — PAIN SCALES - GENERAL
PAINLEVEL_OUTOF10: 10 - WORST POSSIBLE PAIN
PAINLEVEL_OUTOF10: 6
PAINLEVEL_OUTOF10: 8
PAINLEVEL_OUTOF10: 8
PAINLEVEL_OUTOF10: 10 - WORST POSSIBLE PAIN
PAINLEVEL_OUTOF10: 3
PAINLEVEL_OUTOF10: 6
PAINLEVEL_OUTOF10: 6
PAINLEVEL_OUTOF10: 8
PAINLEVEL_OUTOF10: 5 - MODERATE PAIN
PAINLEVEL_OUTOF10: 10 - WORST POSSIBLE PAIN
PAINLEVEL_OUTOF10: 5 - MODERATE PAIN
PAINLEVEL_OUTOF10: 4
PAINLEVEL_OUTOF10: 3
PAINLEVEL_OUTOF10: 10 - WORST POSSIBLE PAIN
PAINLEVEL_OUTOF10: 4
PAINLEVEL_OUTOF10: 10 - WORST POSSIBLE PAIN

## 2024-06-27 ASSESSMENT — COGNITIVE AND FUNCTIONAL STATUS - GENERAL
MOBILITY SCORE: 15
DRESSING REGULAR UPPER BODY CLOTHING: A LITTLE
MOVING TO AND FROM BED TO CHAIR: A LITTLE
MOBILITY SCORE: 15
DRESSING REGULAR LOWER BODY CLOTHING: A LITTLE
MOVING TO AND FROM BED TO CHAIR: A LITTLE
WALKING IN HOSPITAL ROOM: A LOT
MOVING FROM LYING ON BACK TO SITTING ON SIDE OF FLAT BED WITH BEDRAILS: A LITTLE
DAILY ACTIVITIY SCORE: 21
TURNING FROM BACK TO SIDE WHILE IN FLAT BAD: A LITTLE
HELP NEEDED FOR BATHING: A LITTLE
DRESSING REGULAR LOWER BODY CLOTHING: A LITTLE
MOVING FROM LYING ON BACK TO SITTING ON SIDE OF FLAT BED WITH BEDRAILS: A LITTLE
DAILY ACTIVITIY SCORE: 21
WALKING IN HOSPITAL ROOM: A LOT
CLIMB 3 TO 5 STEPS WITH RAILING: A LOT
CLIMB 3 TO 5 STEPS WITH RAILING: A LOT
HELP NEEDED FOR BATHING: A LITTLE
STANDING UP FROM CHAIR USING ARMS: A LOT
TURNING FROM BACK TO SIDE WHILE IN FLAT BAD: A LITTLE
STANDING UP FROM CHAIR USING ARMS: A LOT
DRESSING REGULAR UPPER BODY CLOTHING: A LITTLE

## 2024-06-27 ASSESSMENT — PAIN DESCRIPTION - ORIENTATION: ORIENTATION: RIGHT

## 2024-06-27 ASSESSMENT — PAIN - FUNCTIONAL ASSESSMENT

## 2024-06-27 ASSESSMENT — PAIN DESCRIPTION - LOCATION
LOCATION: ABDOMEN

## 2024-06-27 NOTE — PROGRESS NOTES
"Jatinder Keenan is a 63 y.o. male on day 4 of admission presenting with Diverticulitis of colon with perforation.    Subjective   Pt had abdominal pain and ended up going to OR last night. He continues to have pain.        Objective     Physical Exam  Vitals and nursing note reviewed.   Constitutional:       General: He is not in acute distress.     Appearance: Normal appearance. He is not ill-appearing or toxic-appearing.   HENT:      Head: Normocephalic and atraumatic.      Nose: Nose normal.      Mouth/Throat:      Mouth: Mucous membranes are moist.   Eyes:      Extraocular Movements: Extraocular movements intact.      Conjunctiva/sclera: Conjunctivae normal.      Pupils: Pupils are equal, round, and reactive to light.   Cardiovascular:      Rate and Rhythm: Normal rate and regular rhythm.      Heart sounds: No murmur heard.     No gallop.   Pulmonary:      Effort: Pulmonary effort is normal. No respiratory distress.      Breath sounds: Normal breath sounds. No wheezing, rhonchi or rales.   Abdominal:      General: Abdomen is flat. There is no distension.      Palpations: Abdomen is soft. There is no mass.      Tenderness: There is abdominal tenderness.      Comments: Surgical site: dressing: some drainage appreciated, +ostomy   Musculoskeletal:         General: No swelling or tenderness. Normal range of motion.      Cervical back: Normal range of motion and neck supple.   Skin:     General: Skin is warm and dry.   Neurological:      General: No focal deficit present.      Mental Status: He is alert and oriented to person, place, and time. Mental status is at baseline.   Psychiatric:         Mood and Affect: Mood normal.         Behavior: Behavior normal.         Thought Content: Thought content normal.         Judgment: Judgment normal.         Last Recorded Vitals:  /80 (BP Location: Right arm, Patient Position: Lying)   Pulse 63   Temp 36.8 °C (98.2 °F) (Temporal)   Resp 16   Ht 1.702 m (5' 7\") "   Wt 74.8 kg (165 lb)   SpO2 93%   BMI 25.84 kg/m²      Scheduled medications:  [Held by provider] aspirin, 81 mg, oral, Daily  bisacodyl, 10 mg, rectal, Daily  cefepime, 2 g, intravenous, q8h  ezetimibe, 10 mg, oral, Daily  [Held by provider] heparin (porcine), 5,000 Units, subcutaneous, q8h  metoprolol succinate XL, 25 mg, oral, Daily  metroNIDAZOLE, 500 mg, intravenous, q8h  polyethylene glycol, 17 g, oral, Daily  rosuvastatin, 20 mg, oral, Daily      Continuous medications:       PRN medications:  PRN medications: morphine, morphine, nitroglycerin, ondansetron **OR** ondansetron     Relevant Results:  Results for orders placed or performed during the hospital encounter of 06/23/24 (from the past 24 hour(s))   CBC   Result Value Ref Range    WBC 9.5 4.4 - 11.3 x10*3/uL    nRBC 0.0 0.0 - 0.0 /100 WBCs    RBC 4.07 (L) 4.50 - 5.90 x10*6/uL    Hemoglobin 13.3 (L) 13.5 - 17.5 g/dL    Hematocrit 39.1 (L) 41.0 - 52.0 %    MCV 96 80 - 100 fL    MCH 32.7 26.0 - 34.0 pg    MCHC 34.0 32.0 - 36.0 g/dL    RDW 11.1 (L) 11.5 - 14.5 %    Platelets 382 150 - 450 x10*3/uL   Comprehensive Metabolic Panel   Result Value Ref Range    Glucose 136 (H) 74 - 99 mg/dL    Sodium 133 (L) 136 - 145 mmol/L    Potassium 4.6 3.5 - 5.3 mmol/L    Chloride 100 98 - 107 mmol/L    Bicarbonate 25 21 - 32 mmol/L    Anion Gap 13 10 - 20 mmol/L    Urea Nitrogen 10 6 - 23 mg/dL    Creatinine 0.76 0.50 - 1.30 mg/dL    eGFR >90 >60 mL/min/1.73m*2    Calcium 8.6 8.6 - 10.3 mg/dL    Albumin 3.2 (L) 3.4 - 5.0 g/dL    Alkaline Phosphatase 47 33 - 136 U/L    Total Protein 6.3 (L) 6.4 - 8.2 g/dL    AST 16 9 - 39 U/L    Bilirubin, Total 0.4 0.0 - 1.2 mg/dL    ALT 17 10 - 52 U/L   CBC and Auto Differential   Result Value Ref Range    WBC 11.7 (H) 4.4 - 11.3 x10*3/uL    nRBC 0.0 0.0 - 0.0 /100 WBCs    RBC 4.00 (L) 4.50 - 5.90 x10*6/uL    Hemoglobin 13.1 (L) 13.5 - 17.5 g/dL    Hematocrit 38.7 (L) 41.0 - 52.0 %    MCV 97 80 - 100 fL    MCH 32.8 26.0 - 34.0 pg     MCHC 33.9 32.0 - 36.0 g/dL    RDW 11.0 (L) 11.5 - 14.5 %    Platelets 405 150 - 450 x10*3/uL    Neutrophils % 87.6 40.0 - 80.0 %    Immature Granulocytes %, Automated 0.9 0.0 - 0.9 %    Lymphocytes % 8.1 13.0 - 44.0 %    Monocytes % 3.1 2.0 - 10.0 %    Eosinophils % 0.0 0.0 - 6.0 %    Basophils % 0.3 0.0 - 2.0 %    Neutrophils Absolute 10.28 (H) 1.20 - 7.70 x10*3/uL    Immature Granulocytes Absolute, Automated 0.10 0.00 - 0.70 x10*3/uL    Lymphocytes Absolute 0.95 (L) 1.20 - 4.80 x10*3/uL    Monocytes Absolute 0.36 0.10 - 1.00 x10*3/uL    Eosinophils Absolute 0.00 0.00 - 0.70 x10*3/uL    Basophils Absolute 0.04 0.00 - 0.10 x10*3/uL       CT abdomen pelvis wo IV contrast    Result Date: 6/26/2024  Interpreted By:  Jc Elaine, STUDY: CT ABDOMEN PELVIS WO IV CONTRAST;  6/26/2024 5:19 pm   INDICATION: Signs/Symptoms:increase in abd pain.   COMPARISON: Selected CT abdomen and pelvis examinations as far back as March 26, 2017 including June 23, 2024 CT examination. Images from 05/20/2024 CT-guided pelvic aspiration procedure.   ACCESSION NUMBER(S): [id number]   ORDERING CLINICIAN: PILO JORDAN   TECHNIQUE: CT of the abdomen and pelvis was performed. Contiguous axial images were obtained at 3 mm slice thickness through the abdomen and pelvis. Coronal and sagittal reconstructions at 3 mm slice thickness were performed.  No intravenous or oral contrast agents were administered.   FINDINGS: Please note that the evaluation of vessels, lymph nodes and organs is limited without intravenous contrast.   LOWER CHEST: Increase bronchial impaction right lower lobe. Bibasilar discoid atelectasis increased on the right.   ABDOMEN:   LIVER: Similar appearance with mild scattered homogeneous hypodensities most compatible with cysts although some are too small to characterize.   BILE DUCTS: Normal caliber.   GALLBLADDER: Nondistended containing radiopaque material likely vicarious excretion of contrast.   PANCREAS: Within normal  limits.   SPLEEN: Within normal limits.   ADRENAL GLANDS: Within normal limits.   KIDNEYS AND URETERS: The kidneys are normal in size and unremarkable in appearance.  No hydroureteronephrosis or nephroureterolithiasis is identified.   PELVIS:   BLADDER: Unremarkable   REPRODUCTIVE ORGANS: Prostate minimally indents the base urinary bladder.   BOWEL: Persistent segmental wall thickening involving the sigmoid colon with indistinctness of the surrounding fat compatible with inflammation. There is persistent lack of normal visualization of a portion of the superior wall at site of prior abscess drainage series 203, image 67 with potential contained perforation without remaining drainable abscess. There is similar thickening of the adjacent peritoneum. There is similar prominence of the adjacent lymph nodes including lymph node anterior to the iliac bifurcation measuring 12 mm transverse diameter series 21, image 91. Findings remain compatible with complicated diverticulitis with potential adjacent peritonitis. Underlying mass not reliably excluded. The proximal bowel is nondistended.   VESSELS: Scattered arterial vascular calcifications.   PERITONEUM/RETROPERITONEUM: Minimal persistent free fluid dependent portion of the pelvis inferior to the sigmoid colon coronal series 202, image 73.   ABDOMINAL WALL: Similar indirect fat containing inguinal hernias.   BONES: No acute osseous abnormalities.       1.  Findings compatible with complex sigmoid diverticulitis with persistent segmental wall thickening, and wall abnormality with suggestion of small contained perforation without residual abscess with similar adjacent mesenteric inflammation and peritonitis. 2. Similar adjacent prominent lymph nodes. 3. Underlying mass not reliably excluded. 4. Increase mucous impaction right lower lobe with increase adjacent subsegmental atelectasis.   Jc Elaine discussed the significance and urgency of this critical finding by secure  chat with  PILO JORDAN on 6/26/2024 at 7:08 pm.  (**-RCF-**) Findings:  See findings.   Signed by: Jc Elaine 6/26/2024 7:08 PM Dictation workstation:   [id number]    CT guided aspiration of abscess, hematoma, cyst    Result Date: 6/25/2024  Interpreted By:  Vaughn Mccarthy, STUDY: CT GUIDED ASPIRATION OF ABSCESS, HEMATOMA, CYST;  6/25/2024 10:55 am   INDICATION: Signs/Symptoms:Intra-abdominal abscess drain.   COMPARISON: CT abdomen from 06/23/2024   ACCESSION NUMBER(S): [id number]   ORDERING CLINICIAN: JC ROSE   TECHNIQUE:   CONSENT: The patient/patient's POA/next of kin was informed of the nature of the proposed procedure. The purposes, alternatives, risks, and benefits were explained and discussed. All questions were answered and consent was obtained.   RADIATION EXPOSURE:   SEDATION: Moderate conscious IV sedation services (supervision of administration, induction, and maintenance) were provided by the physician performing the procedure with intravenous fentanyl 100 mcg and versed 1 mg for 20 minutes. The physician was assisted by an independent trained observer, an interventional radiology nurse, in the continuous monitoring of patient level of consciousness and physiologic status.   MEDICATION/CONTRAST: No additional   TIME OUT: A time out was performed immediately prior to procedure start with the interventional team, correctly identifying the patient name, date of birth, MRN, procedure, anatomy (including marking of site and side), patient position, procedure consent form, relevant laboratory and imaging test results, antibiotic administration, safety precautions, and procedure-specific equipment needs.   COMPLICATIONS: No immediate adverse events identified.   FINDINGS: Limited axial CT images were obtained through the abdomen at 5 mm slice thickness. The images demonstrated  air pocket adjacent to the sigmoid colon with minimal amount of fluid but no visible air-fluid levels. There has been  mild interval improvement in pericolonic fat stranding and sigmoid wall edema/thickening when compared to previous exam. No free air is seen to suggest perforation.. The patient was placed on supine positioning and prepped in the usual sterile manner. 1% lidocaine was used for local anesthesia at the planned skin insertion site.   Under direct CT guidance, a 5 [place] Yueh needle/catheter was placed into the collection via  midline anterior abdominal wall approach. After confirmation of position of the needle within the collection, the inner needle/stylette was withdrawn. The content was aspirated and demonstrates purulent fluid.  Subsequently, 10 mL of Omnipaque 350 contrast material was injected into the pocket. The contrast material freely drains into the sigmoid colon. Given these findings the Yueh catheter was removed and no drain was placed in the collection.   Postprocedure images demonstrated no evidence of hemorrhage.   Patient tolerated the procedure without immediate complication. Fluid was sent to the laboratory for further analysis.       Successful CT-guided aspiration of fluid collection in the perisigmoid region as above.   Signed by: Vaughn Mccarthy 6/25/2024 11:22 AM Dictation workstation:   [id number]           Assessment/Plan   Principal Problem:    Diverticulitis of colon with perforation  Active Problems:    Diverticulitis of large intestine with abscess without bleeding    Jatinder Keenan is a 63 y.o. male Ex smoker with a history of CAD s/p MI and PCI to RCA x 2 (2016), HTN, HLD, remote PE, and CVA (not on Eliquis)  who presented to the hospital for constipation and abdominal pain and was found to have acute diverticulitis with perforation     Acute diverticulitis with perforation/sigmoid abscess sp I and D by IR 6/25, sp sigmoid resection, colostomy  - surg following  - cefepime and Flagyl  - ID following  - IVF  - morphine/toradol PRN for pain    Normocytic anemia  -  monitor    Leukocytosis  - monitor    CAD  - crestor  - zetia  - hold ASA    HTN  BP improved off meds  - hold metoprolol     DVT prophylaxis: SCD, hold SQ hep  Dispo: monitor clinically       Sushila Ferguson MD  Hospitalist

## 2024-06-27 NOTE — OP NOTE
Resection Sigmoid Colon, Creation Colostomy Operative Note     Date: 2024 - 2024  OR Location: [facility] OR    Name: Jatinder Keenan, : 1960, Age: 63 y.o., MRN: [id number], Sex: male    Diagnosis  * No Diagnosis Codes entered * * No Diagnosis Codes entered *     Procedures  Resection Sigmoid Colon  85841 - SC COLECTOMY PARTIAL W/ANASTOMOSIS    Creation Colostomy  25282 - SC COLOSTOMY/SKIN LEVEL CECOSTOMY    tap block  Surgeons      * Pallavi Nunn - Primary    Resident/Fellow/Other Assistant:  Surgeons and Role:  * No surgeons found with a matching role *    Procedure Summary  Anesthesia: General  ASA: III  Anesthesia Staff: Anesthesiologist: Tyrone Vang MD  CRNA: МАРИНА Husain-CRNA  Estimated Blood Loss: 20mL  Intra-op Medications:   Administrations occurring from  to  on 24:   Medication Name Total Dose   bupivacaine PF (Marcaine) 0.5 % (5 mg/mL) injection 18 mL   aspirin EC tablet 81 mg Cannot be calculated   bisacodyl (Dulcolax) suppository 10 mg Cannot be calculated   cefepime (Maxipime) IV 2 g Cannot be calculated   ezetimibe (Zetia) tablet 10 mg Cannot be calculated   heparin (porcine) injection 5,000 Units Cannot be calculated   metoprolol succinate XL (Toprol-XL) 24 hr tablet 25 mg Cannot be calculated   metroNIDAZOLE (Flagyl) 500 mg in NaCl (iso-os) 100 mL Cannot be calculated   morphine injection 2 mg Cannot be calculated   morphine injection 4 mg Cannot be calculated   nitroglycerin (Nitrostat) SL tablet 0.4 mg Cannot be calculated   polyethylene glycol (Glycolax, Miralax) packet 17 g Cannot be calculated   rosuvastatin (Crestor) tablet 20 mg Cannot be calculated   fentaNYL PF (Sublimaze) injection  - Omnicell Override Pull Cannot be calculated              Anesthesia Record               Intraprocedure I/O Totals          Intake    LR infusion 1000.00 mL    NaCl 0.9 % infusion 400.00 mL    Total Intake 1400 mL       Output    Urine 50 mL    Total Output 50 mL        Net    Net Volume 1350 mL          Specimen:   ID Type Source Tests Collected by Time   1 : SIGMOID COLON Tissue COLON - SIGMOID RESECTION SURGICAL PATHOLOGY EXAM Pallavi Nunn MD 6/26/2024 8429        Staff:   Circulator: Dahlia Sosa Person: Cristina  RNFA: Hector         Drains and/or Catheters:   NG/OG/Feeding Tube NG - [place] sump 18 Fr Left nostril (Active)       Urethral Catheter 16 Fr. (Active)       Tourniquet Times:         Implants:     Findings: [place] area in the sigmoid colon presumably secondary to diverticulitis spanning a length of at least 8 inches.    Indications: Jatinder Keenan is an 63 y.o. male who is having surgery for * No pre-op diagnosis entered *.  This is a patient admitted with perforated diverticulitis who failed medical management.    The patient was seen in the preoperative area. The risks, benefits, complications, treatment options, non-operative alternatives, expected recovery and outcomes were discussed with the patient. The possibilities of reaction to medication, pulmonary aspiration, injury to surrounding structures, bleeding, recurrent infection, the need for additional procedures, failure to diagnose a condition, and creating a complication requiring transfusion or operation were discussed with the patient. The patient concurred with the proposed plan, giving informed consent.  The site of surgery was properly noted/marked if necessary per policy. The patient has been actively warmed in preoperative area. Preoperative antibiotics have been ordered and given within 1 hours of incision. Venous thrombosis prophylaxis have been ordered including bilateral sequential compression devices    Procedure Details: The patient was brought to the operating theater and placed on the operating table in the supine position after sufficient general anesthesia was administered the patient's abdomen was prepped and draped in the usual sterile manner the incision was made in the  midline and carried down through the subcutaneous tissue using cautery.  We then incised in the midline taking care not to injure any intra-abdominal contents.  The patient on exploration did not have any fecal soilage or gross purulence however he did have just a huge softball sized area of the sigmoid that was rockhard spanning at least 6 to 8 inches of the sigmoid colon and thickened with a terribly thickened mesentery and the surrounding tissue was edematous as well in the pelvis and the lateral pelvic walls.  The sigmoid and this area of diverticulitis was mobilized away from the anterior abdominal wall and in the pelvis it was mobilized off of the rectum we then divided across the distal descending colon and used a DEVONTE to achieve this we then used a LigaSure to divide the mesentery to the sigmoid serially and taking care to stay as close to the colon wall as possible.  Eventually we were able to free this up down to the rectum and identified some fairly normal rectum and went across the rectum somewhat proximally using a TA stapler.  The sigmoid was then submitted as specimen the abdomen was copiously irrigated with 2 L of saline there was no bleeding I did secure the mesentery using some interrupted mattress sutures of 2-0 Vicryl.  We then selected an area in the left lower abdomen made a circular incision in the skin remove some of the subcutaneous tissue made a cruciate incision in the anterior fascia and then this spread the rectus muscle and created an incision in the posterior sheath.  Through this we were able to bring out the proximal segment of the colon without too much more mobilization along the white line of T[place]t.  The abdomen was further inspected all appeared to be well therefore the closure was carried out using a running looped 0 PDS suture 1 from the top 1 from the bottom we also did a transversus abdominis plane block on both sides using half percent Marcaine.  We then closed the skin and  subcu using 3-0 Vicryl for the subcu staples and katherine for the skin and in some areas some 4-0 Vicryl subcuticular sutures I then matured the colostomy using interrupted 3-0 Vicryl sutures and an ostomy appliance was applied as well as a dressing.  He tolerated this well.  Complications:  None; patient tolerated the procedure well.    Disposition: PACU - hemodynamically stable.  Condition: stable         Additional Details:     Attending Attestation:     Pallavi Nunn  Phone Number: [phone]

## 2024-06-27 NOTE — ANESTHESIA PROCEDURE NOTES
Airway  Date/Time: 6/26/2024 9:00 PM  Urgency: elective    Airway not difficult    Staffing  Performed: CRNA   Authorized by: Tyorne Vang MD    Performed by: МАРИНА Husain-CRNA  Patient location during procedure: OR    Indications and Patient Condition  Indications for airway management: anesthesia and airway protection  Spontaneous Ventilation: absent  Sedation level: deep  Preoxygenated: yes  Mask difficulty assessment: 0 - not attempted  Planned trial extubation    Final Airway Details  Final airway type: endotracheal airway      Successful airway: ETT  Cuffed: yes   Successful intubation technique: video laryngoscopy (Wayne #4)  Facilitating devices/methods: intubating stylet and cricoid pressure  Endotracheal tube insertion site: oral  Blade size: #4  ETT size (mm): 7.5  Cormack-Lehane Classification: grade I - full view of glottis  Placement verified by: chest auscultation, capnometry and palpation of cuff   Measured from: lips  ETT to lips (cm): 22  Number of attempts at approach: 1    Additional Comments  RSI performed.  Easy intubation with Wayne #4.  Teeth and lips in preanesthetic condition.

## 2024-06-27 NOTE — CARE PLAN
The patient's goals for the shift include      The clinical goals for the shift include patient pain will remain controled through out shift    Over the shift, the patient did not make progress toward the following goals. Barriers to progression include . Recommendations to address these barriers include .  Problem: Pain - Adult  Goal: Verbalizes/displays adequate comfort level or baseline comfort level  Outcome: Progressing     Problem: Safety - Adult  Goal: Free from fall injury  Outcome: Progressing

## 2024-06-27 NOTE — ANESTHESIA POSTPROCEDURE EVALUATION
Patient: Jatinder Keenan    Procedure Summary       Date: 06/26/24 Room / Location: [facility] OR 08 / [facility]    Anesthesia Start: 2051 Anesthesia Stop: 2312    Procedures:       Resection Sigmoid Colon      Creation Colostomy (Abdomen) Diagnosis:     Surgeons: Pallavi Nunn MD Responsible Provider: Tyrone Vang MD    Anesthesia Type: general ASA Status: 3 - Emergent            Anesthesia Type: general    Vitals Value Taken Time   BP See vitals 06/26/24 2312   Temp  06/26/24 2312   Pulse  06/26/24 2312   Resp  06/26/24 2312   SpO2  06/26/24 2312       Anesthesia Post Evaluation    Patient location during evaluation: PACU  Patient participation: complete - patient participated  Level of consciousness: awake  Pain management: adequate  Multimodal analgesia pain management approach  Airway patency: patent  Two or more strategies used to mitigate risk of obstructive sleep apnea  Cardiovascular status: acceptable  Respiratory status: acceptable  Hydration status: acceptable  Postoperative Nausea and Vomiting: none        There were no known notable events for this encounter.     Abdomen soft, non-tender, no guarding.

## 2024-06-27 NOTE — PROGRESS NOTES
Patient: Jatinder Keenan Age: 63 y.o.   Gender: male Room/bed: 122/122-A     Attending: Sushila Ferguson MD  Code Status:  Full Code    Subjective  Had worsening abd pain and popping sensation in abd last night. Subsequently underwent sigmoid resection with colostomy creation. This morning has abd pain still. No f/c. No n/v.     Objective:  Physical Exam  Constitutional:       General: He is not in acute distress.     Appearance: Normal appearance.   HENT:      Head: Normocephalic and atraumatic.      Right Ear: Tympanic membrane, ear canal and external ear normal.      Left Ear: Tympanic membrane, ear canal and external ear normal.      Nose: Nose normal.      Mouth/Throat:      Mouth: Mucous membranes are moist.      Pharynx: Oropharynx is clear.   Eyes:      General: No scleral icterus.     Extraocular Movements: Extraocular movements intact.      Conjunctiva/sclera: Conjunctivae normal.      Pupils: Pupils are equal, round, and reactive to light.   Cardiovascular:      Rate and Rhythm: Normal rate and regular rhythm.      Pulses: Normal pulses.      Heart sounds: Normal heart sounds. No murmur heard.     No gallop.   Pulmonary:      Effort: Pulmonary effort is normal.      Breath sounds: Normal breath sounds.   Abdominal:      General: Abdomen is flat. Bowel sounds are normal.      Palpations: Abdomen is soft.      Tenderness: There is abdominal tenderness. There is no guarding or rebound.   Musculoskeletal:         General: Normal range of motion.      Cervical back: Normal range of motion and neck supple.      Right lower leg: No edema.      Left lower leg: No edema.   Skin:     General: Skin is warm and dry.      Capillary Refill: Capillary refill takes less than 2 seconds.      Coloration: Skin is not jaundiced or pale.      Findings: No bruising, erythema, lesion or rash.   Neurological:      General: No focal deficit present.      Mental Status: He is alert and oriented to person, place, and time.  Mental status is at baseline.      Deep Tendon Reflexes: Reflexes normal.   Psychiatric:         Mood and Affect: Mood normal.         Behavior: Behavior normal.          Temp:  [36.2 °C (97.2 °F)-37.5 °C (99.5 °F)] 36.4 °C (97.5 °F)  Heart Rate:  [53-78] 53  Resp:  [13-18] 18  BP: (119-146)/(70-81) 129/73      Intake/Output Summary (Last 24 hours) at 6/27/2024 1210  Last data filed at 6/27/2024 1025  Gross per 24 hour   Intake 3100 ml   Output 120 ml   Net 2980 ml       Vitals:    06/25/24 0958   Weight: 74.8 kg (165 lb)             I/Os    Intake/Output Summary (Last 24 hours) at 6/27/2024 1210  Last data filed at 6/27/2024 1025  Gross per 24 hour   Intake 3100 ml   Output 120 ml   Net 2980 ml       Labs:   CBC:  Recent Labs     06/27/24  0648 06/26/24  1830 06/26/24  0500   WBC 11.7* 9.5 8.4   HGB 13.1* 13.3* 12.7*   HCT 38.7* 39.1* 37.7*    382 366   MCV 97 96 97     CMP:  Recent Labs     06/27/24  0648 06/26/24  0500 06/25/24  0646   * 137 136   K 4.6 5.1 4.1    102 101   CO2 25 27 28   ANIONGAP 13 13 11   BUN 10 10 11   CREATININE 0.76 0.77 0.82   EGFR >90 >90 >90   GLUCOSE 136* 80 88     Recent Labs     06/27/24  0648 06/26/24  0500 06/25/24  0646 06/23/24  1633   ALBUMIN 3.2* 3.2* 3.1* 3.6   ALKPHOS 47 45  --  53   ALT 17 16  --  20   AST 16 19  --  34   BILITOT 0.4 0.4  --  0.5     Calcium/Phos:   Lab Results   Component Value Date    CALCIUM 8.6 06/27/2024    PHOS 3.2 06/25/2024      COAG:   Recent Labs     06/24/24  0525 04/29/18 2030 02/02/18  1330   INR 1.4* 1.1 1.0     CRP:   Lab Results   Component Value Date    CRP 12.31 (H) 06/23/2024      [unfilled]   ENDO:  Recent Labs     04/30/18  0622   HGBA1C 6.4      CARDIAC:   Recent Labs     04/30/18  0622   BNP 10     Recent Labs     06/20/22  0930 06/16/21  0737 07/17/19  1221   CHOL 82 99 93   LDLF 30 43 32   HDL 42.5 36.4* 30.1*   TRIG 46 96 153*     No data recorded    Micro/ID:   No results found for the last 90 days.           "         No lab exists for component: \"AGALPCRNB\"   .ID  Lab Results   Component Value Date    BLOODCULT No growth at 3 days 06/23/2024    BLOODCULT No growth at 3 days 06/23/2024       Images:  CT abdomen pelvis wo IV contrast  Narrative: Interpreted By:  Jc Elaine,   STUDY:  CT ABDOMEN PELVIS WO IV CONTRAST;  6/26/2024 5:19 pm      INDICATION:  Signs/Symptoms:increase in abd pain.      COMPARISON:  Selected CT abdomen and pelvis examinations as far back as March 26, 2017 including June 23, 2024 CT examination. Images from 05/20/2024  CT-guided pelvic aspiration procedure.      ACCESSION NUMBER(S):  [id number]      ORDERING CLINICIAN:  PILO JORDAN      TECHNIQUE:  CT of the abdomen and pelvis was performed. Contiguous axial images  were obtained at 3 mm slice thickness through the abdomen and pelvis.  Coronal and sagittal reconstructions at 3 mm slice thickness were  performed.  No intravenous or oral contrast agents were administered.      FINDINGS:  Please note that the evaluation of vessels, lymph nodes and organs is  limited without intravenous contrast.      LOWER CHEST:  Increase bronchial impaction right lower lobe. Bibasilar discoid  atelectasis increased on the right.      ABDOMEN:      LIVER:  Similar appearance with mild scattered homogeneous hypodensities most  compatible with cysts although some are too small to characterize.      BILE DUCTS:  Normal caliber.      GALLBLADDER:  Nondistended containing radiopaque material likely vicarious  excretion of contrast.      PANCREAS:  Within normal limits.      SPLEEN:  Within normal limits.      ADRENAL GLANDS:  Within normal limits.      KIDNEYS AND URETERS:  The kidneys are normal in size and unremarkable in appearance.  No  hydroureteronephrosis or nephroureterolithiasis is identified.      PELVIS:      BLADDER:  Unremarkable      REPRODUCTIVE ORGANS:  Prostate minimally indents the base urinary bladder.      BOWEL:  Persistent segmental wall " thickening involving the sigmoid colon with  indistinctness of the surrounding fat compatible with inflammation.  There is persistent lack of normal visualization of a portion of the  superior wall at site of prior abscess drainage series 203, image 67  with potential contained perforation without remaining drainable  abscess. There is similar thickening of the adjacent peritoneum.  There is similar prominence of the adjacent lymph nodes including  lymph node anterior to the iliac bifurcation measuring 12 mm  transverse diameter series 21, image 91. Findings remain compatible  with complicated diverticulitis with potential adjacent peritonitis.  Underlying mass not reliably excluded. The proximal bowel is  nondistended.      VESSELS:  Scattered arterial vascular calcifications.      PERITONEUM/RETROPERITONEUM:  Minimal persistent free fluid dependent portion of the pelvis  inferior to the sigmoid colon coronal series 202, image 73.      ABDOMINAL WALL:  Similar indirect fat containing inguinal hernias.      BONES:  No acute osseous abnormalities.      Impression: 1.  Findings compatible with complex sigmoid diverticulitis with  persistent segmental wall thickening, and wall abnormality with  suggestion of small contained perforation without residual abscess  with similar adjacent mesenteric inflammation and peritonitis.  2. Similar adjacent prominent lymph nodes.  3. Underlying mass not reliably excluded.  4. Increase mucous impaction right lower lobe with increase adjacent  subsegmental atelectasis.      Jc Elaine discussed the significance and urgency of this  critical finding by secure chat with  PILO JORDAN on 6/26/2024 at  7:08 pm.  (**-RCF-**) Findings:  See findings.      Signed by: Jc Elaine 6/26/2024 7:08 PM  Dictation workstation:   [id number]       Medications:  Scheduled medications  [Held by provider] aspirin, 81 mg, oral, Daily  bisacodyl, 10 mg, rectal, Daily  cefepime, 2 g,  intravenous, q8h  ezetimibe, 10 mg, oral, Daily  [Held by provider] heparin (porcine), 5,000 Units, subcutaneous, q8h  metoprolol succinate XL, 25 mg, oral, Daily  metroNIDAZOLE, 500 mg, intravenous, q8h  polyethylene glycol, 17 g, oral, Daily  rosuvastatin, 20 mg, oral, Daily      Continuous medications       PRN medications  PRN medications: morphine, morphine, nitroglycerin, ondansetron **OR** ondansetron     Assessment and Plan:  Jatinder Keenan is a 63 y.o. male presenting for acute diverticulitis with perforation and wall abscess sp sigmoid resection with colostomy creation.     Assessment:  #acute diverticulitis with perforation and abscess in the wall of sigmoid colon sp Ct guided aspiration and subsequent sigmoid resection with colostomy creation. Blood cxs NTD.      Recommendations:   -continue cefepime and flagyl  -IS      Curt Smith DO   PGY-2, IM         Attending note : the patient was evaluated, the note was reviewed and updated  Sigmoid diverticulitis / abscess, sp ct drainage, free communication to colon cavity, he had increased pain, was taken to surgery  Recommendations :  Continue Cefepime and Flagyl  Discussed with the medical team     Andressa Chery M.D

## 2024-06-27 NOTE — PROGRESS NOTES
06/27/24 1600   Discharge Planning   Patient expects to be discharged to: [facility] unable to accept. Next preference [facility] and they are unable to accept. Next preference is [facility] (known [facility]). Referral sent for review.

## 2024-06-27 NOTE — CONSULTS
"Nutrition Assessment Note  Nutrition Assessment      Reason for Assessment  Reason for Assessment: Provider consult order (diet education)    History:  Food and Nutrient History  Food and Nutrient History: Pt NPO with NGT, s/p new colostomy, wanting diet instruction for home going needs.       Anthropometrics:  Height: 170.2 cm (5' 7\")  Weight: 74.8 kg (165 lb)  BMI (Calculated): 25.84    Weight Change: 0      Education Documentation  Nutrition therapy for new colostomy, taught by Alana Shay RD at 6/27/2024  2:10 PM.  Learner: Family, Patient  Readiness: Acceptance  Method: Explanation, Handout  Response: Verbalizes Understanding, Needs Reinforcement           Follow Up  Time Spent (min): 30 minutes  Last Date of Nutrition Visit: 06/27/24  Nutrition Follow-Up Needed?: Dietitian to reassess per policy       "

## 2024-06-27 NOTE — CARE PLAN
The patient's goals for the shift include  pain free    The clinical goals for the shift include Patient will have better pain control.    Over the shift, the patient did not make progress toward the following goals. Barriers to progression include acute illness. Recommendations to address these barriers include medications..

## 2024-06-27 NOTE — PROGRESS NOTES
Jatinder Keenan is a 63 y.o. male on day 4 of admission presenting with Diverticulitis of colon with perforation.    Subjective   Overnight patient had increase in pain in LLQ last night and was taken to the OR for resection sigmoid colon with creation of colostomy with Dr. Nunn.    This morning patient looks more comfortable though does c/o soreness from surgery. However his pain is less. Mouth is dry and would like ice chips.     No n/v. NG present, grey present.   No fever or chills.        Objective     Physical Exam  Vitals reviewed.   Constitutional:       General: He is not in acute distress.     Appearance: Normal appearance. He is normal weight. He is not ill-appearing, toxic-appearing or diaphoretic.   HENT:      Head: Normocephalic and atraumatic.      Nose:      Comments: NG tube in left nares  Eyes:      General: No scleral icterus.        Right eye: No discharge.         Left eye: No discharge.      Conjunctiva/sclera: Conjunctivae normal.   Cardiovascular:      Rate and Rhythm: Normal rate and regular rhythm.      Pulses: Normal pulses.      Heart sounds: Normal heart sounds. No murmur heard.     No friction rub. No gallop.   Pulmonary:      Effort: Pulmonary effort is normal. No respiratory distress.      Breath sounds: Normal breath sounds. No stridor. No wheezing, rhonchi or rales.   Chest:      Chest wall: No tenderness.   Abdominal:      General: There is distension.      Tenderness: There is abdominal tenderness.      Comments:   Left abd colostomy with beefy red moist stoma.  Mid line dressing with serous drainage, dressing changed. Nicole in place from surgery, these remained in place and were not removed.    Musculoskeletal:      Right lower leg: No edema.      Left lower leg: No edema.   Skin:     General: Skin is warm and dry.   Neurological:      Mental Status: He is alert and oriented to person, place, and time.      Motor: Weakness present.      Gait: Gait normal.   Psychiatric:   "       Mood and Affect: Mood normal.         Behavior: Behavior normal.         Thought Content: Thought content normal.         Judgment: Judgment normal.         Last Recorded Vitals  Blood pressure 133/81, pulse 62, temperature 36.3 °C (97.3 °F), temperature source Temporal, resp. rate 16, height 1.702 m (5' 7\"), weight 74.8 kg (165 lb), SpO2 94%.  Intake/Output last 3 Shifts:  I/O last 3 completed shifts:  In: 3720 (49.7 mL/kg) [P.O.:520; I.V.:2900 (38.7 mL/kg); IV Piggyback:300]  Out: 120 (1.6 mL/kg) [Urine:100 (0 mL/kg/hr); Blood:20]  Weight: 74.8 kg     Relevant Results    CT abdomen pelvis wo IV contrast    Result Date: 6/26/2024  Interpreted By:  Jc Elaine, STUDY: CT ABDOMEN PELVIS WO IV CONTRAST;  6/26/2024 5:19 pm   INDICATION: Signs/Symptoms:increase in abd pain.   COMPARISON: Selected CT abdomen and pelvis examinations as far back as March 26, 2017 including June 23, 2024 CT examination. Images from 05/20/2024 CT-guided pelvic aspiration procedure.   ACCESSION NUMBER(S): [id number]   ORDERING CLINICIAN: PILO JORDAN   TECHNIQUE: CT of the abdomen and pelvis was performed. Contiguous axial images were obtained at 3 mm slice thickness through the abdomen and pelvis. Coronal and sagittal reconstructions at 3 mm slice thickness were performed.  No intravenous or oral contrast agents were administered.   FINDINGS: Please note that the evaluation of vessels, lymph nodes and organs is limited without intravenous contrast.   LOWER CHEST: Increase bronchial impaction right lower lobe. Bibasilar discoid atelectasis increased on the right.   ABDOMEN:   LIVER: Similar appearance with mild scattered homogeneous hypodensities most compatible with cysts although some are too small to characterize.   BILE DUCTS: Normal caliber.   GALLBLADDER: Nondistended containing radiopaque material likely vicarious excretion of contrast.   PANCREAS: Within normal limits.   SPLEEN: Within normal limits.   ADRENAL GLANDS: " Within normal limits.   KIDNEYS AND URETERS: The kidneys are normal in size and unremarkable in appearance.  No hydroureteronephrosis or nephroureterolithiasis is identified.   PELVIS:   BLADDER: Unremarkable   REPRODUCTIVE ORGANS: Prostate minimally indents the base urinary bladder.   BOWEL: Persistent segmental wall thickening involving the sigmoid colon with indistinctness of the surrounding fat compatible with inflammation. There is persistent lack of normal visualization of a portion of the superior wall at site of prior abscess drainage series 203, image 67 with potential contained perforation without remaining drainable abscess. There is similar thickening of the adjacent peritoneum. There is similar prominence of the adjacent lymph nodes including lymph node anterior to the iliac bifurcation measuring 12 mm transverse diameter series 21, image 91. Findings remain compatible with complicated diverticulitis with potential adjacent peritonitis. Underlying mass not reliably excluded. The proximal bowel is nondistended.   VESSELS: Scattered arterial vascular calcifications.   PERITONEUM/RETROPERITONEUM: Minimal persistent free fluid dependent portion of the pelvis inferior to the sigmoid colon coronal series 202, image 73.   ABDOMINAL WALL: Similar indirect fat containing inguinal hernias.   BONES: No acute osseous abnormalities.       1.  Findings compatible with complex sigmoid diverticulitis with persistent segmental wall thickening, and wall abnormality with suggestion of small contained perforation without residual abscess with similar adjacent mesenteric inflammation and peritonitis. 2. Similar adjacent prominent lymph nodes. 3. Underlying mass not reliably excluded. 4. Increase mucous impaction right lower lobe with increase adjacent subsegmental atelectasis.   Jc Elaine discussed the significance and urgency of this critical finding by secure chat with  PILO JORDAN on 6/26/2024 at 7:08 pm.   (**-RCF-**) Findings:  See findings.   Signed by: Jc Elaine 6/26/2024 7:08 PM Dictation workstation:   [id number]    CT guided aspiration of abscess, hematoma, cyst    Result Date: 6/25/2024  Interpreted By:  Vaughn Mccarthy, STUDY: CT GUIDED ASPIRATION OF ABSCESS, HEMATOMA, CYST;  6/25/2024 10:55 am   INDICATION: Signs/Symptoms:Intra-abdominal abscess drain.   COMPARISON: CT abdomen from 06/23/2024   ACCESSION NUMBER(S): [id number]   ORDERING CLINICIAN: JC ROSE   TECHNIQUE:   CONSENT: The patient/patient's POA/next of kin was informed of the nature of the proposed procedure. The purposes, alternatives, risks, and benefits were explained and discussed. All questions were answered and consent was obtained.   RADIATION EXPOSURE:   SEDATION: Moderate conscious IV sedation services (supervision of administration, induction, and maintenance) were provided by the physician performing the procedure with intravenous fentanyl 100 mcg and versed 1 mg for 20 minutes. The physician was assisted by an independent trained observer, an interventional radiology nurse, in the continuous monitoring of patient level of consciousness and physiologic status.   MEDICATION/CONTRAST: No additional   TIME OUT: A time out was performed immediately prior to procedure start with the interventional team, correctly identifying the patient name, date of birth, MRN, procedure, anatomy (including marking of site and side), patient position, procedure consent form, relevant laboratory and imaging test results, antibiotic administration, safety precautions, and procedure-specific equipment needs.   COMPLICATIONS: No immediate adverse events identified.   FINDINGS: Limited axial CT images were obtained through the abdomen at 5 mm slice thickness. The images demonstrated  air pocket adjacent to the sigmoid colon with minimal amount of fluid but no visible air-fluid levels. There has been mild interval improvement in pericolonic fat  stranding and sigmoid wall edema/thickening when compared to previous exam. No free air is seen to suggest perforation.. The patient was placed on supine positioning and prepped in the usual sterile manner. 1% lidocaine was used for local anesthesia at the planned skin insertion site.   Under direct CT guidance, a 5 Vietnamese Yueh needle/catheter was placed into the collection via  midline anterior abdominal wall approach. After confirmation of position of the needle within the collection, the inner needle/stylette was withdrawn. The content was aspirated and demonstrates purulent fluid.  Subsequently, 10 mL of Omnipaque 350 contrast material was injected into the pocket. The contrast material freely drains into the sigmoid colon. Given these findings the Yueh catheter was removed and no drain was placed in the collection.   Postprocedure images demonstrated no evidence of hemorrhage.   Patient tolerated the procedure without immediate complication. Fluid was sent to the laboratory for further analysis.       Successful CT-guided aspiration of fluid collection in the perisigmoid region as above.   Signed by: Vaughn Mccarthy 6/25/2024 11:22 AM Dictation workstation:   [id number]    CT abdomen pelvis w IV contrast    Result Date: 6/23/2024  Interpreted By:  Flavio Miranda, STUDY: CT ABDOMEN PELVIS W IV CONTRAST;  6/23/2024 6:28 pm   INDICATION: Signs/Symptoms:Constipation for 2 weeks.  Pain to bilateral lower quadrants.   COMPARISON: None.   ACCESSION NUMBER(S): [id number]   ORDERING CLINICIAN: AYAAN SHEETS   TECHNIQUE: Contiguous axial images of the abdomen and pelvis were obtained after the intravenous administration of  contrast. Coronal and sagittal reformatted images were obtained from the axial images.   FINDINGS: There is limited evaluation of the lung bases. Mild basilar atelectasis. Coronary artery atherosclerotic calcifications.   2.8 cm cyst in the left hepatic lobe and several subcentimeter hepatic  hypodensities which are too small to characterize. The gallbladder is present. No dilatation common bile duct.   The pancreas, spleen, and adrenal glands appear unremarkable.   Symmetric enhancement of the kidneys. No hydronephrosis.   No evidence of bowel obstruction or acute appendicitis. Fluid-filled segments of colon. There is colonic diverticulosis with prominent wall thickening and edema of the surrounding the sigmoid colon with findings compatible with colonic perforation and there is a 3.7 x 2.8 cm abscess involving the wall of the sigmoid colon. There prominent lymph nodes superior to sigmoid colon which measure up to 1.6 cm.   Urinary bladder is underdistended and not well evaluated.   Multilevel degenerative change of the lumbar spine.       Colonic diverticulosis with prominent wall thickening and edema of the sigmoid colon compatible with acute diverticulitis and evidence of component of perforation with prominent surrounding edema and a 3.7 cm x 2.8 cm abscess in the wall of the sigmoid colon. Prominent and enlarged lymph nodes superior to the sigmoid colon measure up to 1.6 cm.   Follow-up colonoscopy is recommended after treatment to exclude other underlying pathology including mass/malignancy.   MACRO: None   Signed by: Flavio Miranda 6/23/2024 7:35 PM Dictation workstation:   [id number]     Scheduled medications  aspirin, 81 mg, oral, Daily  bisacodyl, 10 mg, rectal, Daily  cefepime, 2 g, intravenous, q8h  ezetimibe, 10 mg, oral, Daily  heparin (porcine), 5,000 Units, subcutaneous, q8h  metoprolol succinate XL, 25 mg, oral, Daily  metroNIDAZOLE, 500 mg, intravenous, q8h  polyethylene glycol, 17 g, oral, Daily  rosuvastatin, 20 mg, oral, Daily      Continuous medications     PRN medications  PRN medications: morphine, morphine, nitroglycerin, ondansetron **OR** ondansetron  Results for orders placed or performed during the hospital encounter of 06/23/24 (from the past 24 hour(s))   CBC   Result  Value Ref Range    WBC 9.5 4.4 - 11.3 x10*3/uL    nRBC 0.0 0.0 - 0.0 /100 WBCs    RBC 4.07 (L) 4.50 - 5.90 x10*6/uL    Hemoglobin 13.3 (L) 13.5 - 17.5 g/dL    Hematocrit 39.1 (L) 41.0 - 52.0 %    MCV 96 80 - 100 fL    MCH 32.7 26.0 - 34.0 pg    MCHC 34.0 32.0 - 36.0 g/dL    RDW 11.1 (L) 11.5 - 14.5 %    Platelets 382 150 - 450 x10*3/uL   Comprehensive Metabolic Panel   Result Value Ref Range    Glucose 136 (H) 74 - 99 mg/dL    Sodium 133 (L) 136 - 145 mmol/L    Potassium 4.6 3.5 - 5.3 mmol/L    Chloride 100 98 - 107 mmol/L    Bicarbonate 25 21 - 32 mmol/L    Anion Gap 13 10 - 20 mmol/L    Urea Nitrogen 10 6 - 23 mg/dL    Creatinine 0.76 0.50 - 1.30 mg/dL    eGFR >90 >60 mL/min/1.73m*2    Calcium 8.6 8.6 - 10.3 mg/dL    Albumin 3.2 (L) 3.4 - 5.0 g/dL    Alkaline Phosphatase 47 33 - 136 U/L    Total Protein 6.3 (L) 6.4 - 8.2 g/dL    AST 16 9 - 39 U/L    Bilirubin, Total 0.4 0.0 - 1.2 mg/dL    ALT 17 10 - 52 U/L   CBC and Auto Differential   Result Value Ref Range    WBC 11.7 (H) 4.4 - 11.3 x10*3/uL    nRBC 0.0 0.0 - 0.0 /100 WBCs    RBC 4.00 (L) 4.50 - 5.90 x10*6/uL    Hemoglobin 13.1 (L) 13.5 - 17.5 g/dL    Hematocrit 38.7 (L) 41.0 - 52.0 %    MCV 97 80 - 100 fL    MCH 32.8 26.0 - 34.0 pg    MCHC 33.9 32.0 - 36.0 g/dL    RDW 11.0 (L) 11.5 - 14.5 %    Platelets 405 150 - 450 x10*3/uL    Neutrophils % 87.6 40.0 - 80.0 %    Immature Granulocytes %, Automated 0.9 0.0 - 0.9 %    Lymphocytes % 8.1 13.0 - 44.0 %    Monocytes % 3.1 2.0 - 10.0 %    Eosinophils % 0.0 0.0 - 6.0 %    Basophils % 0.3 0.0 - 2.0 %    Neutrophils Absolute 10.28 (H) 1.20 - 7.70 x10*3/uL    Immature Granulocytes Absolute, Automated 0.10 0.00 - 0.70 x10*3/uL    Lymphocytes Absolute 0.95 (L) 1.20 - 4.80 x10*3/uL    Monocytes Absolute 0.36 0.10 - 1.00 x10*3/uL    Eosinophils Absolute 0.00 0.00 - 0.70 x10*3/uL    Basophils Absolute 0.04 0.00 - 0.10 x10*3/uL               This patient has a urinary catheter   Reason for the urinary catheter remaining  today? perioperative use for selected surgical procedures         Assessment/Plan   Principal Problem:    Diverticulitis of colon with perforation  Active Problems:    Diverticulitis of large intestine with abscess without bleeding    Pt is day 1 post op for resection sigmoid colon with creation of colostomy  - reviewed labs and imaging: anemia stable, WBC count elevated. Will cont to monitor labs.  - consult to wound/ostomy nurse  - changed dressing that was soiled but left katherine from surgery  - NPO with ice chips  - NG to LIWS  - grey to remain today until ambulating well  - activity as tolerated  - hold anticoagulation today d/t inc risk bleeding with surgery within 24 hours. May resume 6/28/24  - SCDs while in bed for DVT prevention  - pain medicine morphine IV PRN as ordered  - zofran IV/PO PRN as ordered  - cont IV ABX as ordered, flagyl and cefepime   - remainder of care per primary         Pt seen with Dr. Nunn, plan of care discussed with Dr. Nunn.     Pebbles Webster, APRN-CNP

## 2024-06-27 NOTE — CONSULTS
Wound Care Consult     Visit Date: 6/27/2024      Patient Name: Jatinder Keenan         MRN: [id number]           YOB: 1960     Reason for Consult: New ostomy        Wound History: Perforated diverticulitis, failed medical management.     Pertinent Labs:   Albumin   Date Value Ref Range Status   06/27/2024 3.2 (L) 3.4 - 5.0 g/dL Final       Wound Assessment:  Wound 06/26/24 Incision Abdomen (Active)   Closure Sutures;Approximated;Staples 06/26/24 2258   Drainage Amount None 06/26/24 2322   Dressing Other (Comment) 06/26/24 2258   Dressing Changed New 06/26/24 2322   Dressing Status Clean;Dry 06/26/24 2322       Wound Team Summary Assessment: New education start:    - NG intact, vent end under pillow- no drainage in canister, moved tubing, small amount of drainage started to move in tubing.    - In bed, in pain, nausea- RN notified, NP at bedside ordered stat xray to ensure placement.  - SCD's turned on after confirming order.  - Ostomy appeared red beefy, small amount of dark liquid stool in pouch.  - Son at bedside, asked for return visit tomorrow afternoon.  - Education folder left with son to review.    Wound Team Plan: Will return early afternoon tomorrow for further education.     Ambar Carmona RN  6/27/2024  6:19 PM

## 2024-06-28 ENCOUNTER — APPOINTMENT (OUTPATIENT)
Dept: RADIOLOGY | Facility: HOSPITAL | Age: 64
DRG: 331 | End: 2024-06-28
Payer: COMMERCIAL

## 2024-06-28 ENCOUNTER — APPOINTMENT (OUTPATIENT)
Dept: CARDIOLOGY | Facility: HOSPITAL | Age: 64
DRG: 331 | End: 2024-06-28
Payer: COMMERCIAL

## 2024-06-28 LAB
ALBUMIN SERPL BCP-MCNC: 3.1 G/DL (ref 3.4–5)
ALP SERPL-CCNC: 44 U/L (ref 33–136)
ALT SERPL W P-5'-P-CCNC: 19 U/L (ref 10–52)
ANION GAP SERPL CALC-SCNC: 10 MMOL/L (ref 10–20)
AST SERPL W P-5'-P-CCNC: 17 U/L (ref 9–39)
ATRIAL RATE: 62 BPM
B-LACTAMASE ORGANISM ISLT: NEGATIVE
BACTERIA BLD CULT: NORMAL
BACTERIA BLD CULT: NORMAL
BACTERIA FLD CULT: ABNORMAL
BACTERIA FLD CULT: ABNORMAL
BASOPHILS # BLD AUTO: 0.04 X10*3/UL (ref 0–0.1)
BASOPHILS NFR BLD AUTO: 0.3 %
BILIRUB SERPL-MCNC: 0.4 MG/DL (ref 0–1.2)
BUN SERPL-MCNC: 14 MG/DL (ref 6–23)
CALCIUM SERPL-MCNC: 8.6 MG/DL (ref 8.6–10.3)
CHLORIDE SERPL-SCNC: 99 MMOL/L (ref 98–107)
CO2 SERPL-SCNC: 27 MMOL/L (ref 21–32)
CREAT SERPL-MCNC: 0.76 MG/DL (ref 0.5–1.3)
EGFRCR SERPLBLD CKD-EPI 2021: >90 ML/MIN/1.73M*2
EOSINOPHIL # BLD AUTO: 0.08 X10*3/UL (ref 0–0.7)
EOSINOPHIL NFR BLD AUTO: 0.6 %
ERYTHROCYTE [DISTWIDTH] IN BLOOD BY AUTOMATED COUNT: 11 % (ref 11.5–14.5)
GLUCOSE BLD MANUAL STRIP-MCNC: 127 MG/DL (ref 74–99)
GLUCOSE SERPL-MCNC: 121 MG/DL (ref 74–99)
GRAM STN SPEC: ABNORMAL
GRAM STN SPEC: ABNORMAL
HCT VFR BLD AUTO: 39.8 % (ref 41–52)
HGB BLD-MCNC: 13.6 G/DL (ref 13.5–17.5)
IMM GRANULOCYTES # BLD AUTO: 0.13 X10*3/UL (ref 0–0.7)
IMM GRANULOCYTES NFR BLD AUTO: 1 % (ref 0–0.9)
LYMPHOCYTES # BLD AUTO: 2.04 X10*3/UL (ref 1.2–4.8)
LYMPHOCYTES NFR BLD AUTO: 15.2 %
MCH RBC QN AUTO: 32.9 PG (ref 26–34)
MCHC RBC AUTO-ENTMCNC: 34.2 G/DL (ref 32–36)
MCV RBC AUTO: 96 FL (ref 80–100)
MONOCYTES # BLD AUTO: 0.97 X10*3/UL (ref 0.1–1)
MONOCYTES NFR BLD AUTO: 7.2 %
NEUTROPHILS # BLD AUTO: 10.14 X10*3/UL (ref 1.2–7.7)
NEUTROPHILS NFR BLD AUTO: 75.7 %
NRBC BLD-RTO: 0 /100 WBCS (ref 0–0)
P AXIS: -12 DEGREES
P OFFSET: 196 MS
P ONSET: 173 MS
PLATELET # BLD AUTO: 441 X10*3/UL (ref 150–450)
POTASSIUM SERPL-SCNC: 4.2 MMOL/L (ref 3.5–5.3)
PR INTERVAL: 90 MS
PROT SERPL-MCNC: 6.3 G/DL (ref 6.4–8.2)
Q ONSET: 218 MS
QRS COUNT: 10 BEATS
QRS DURATION: 130 MS
QT INTERVAL: 458 MS
QTC CALCULATION(BAZETT): 464 MS
QTC FREDERICIA: 463 MS
R AXIS: -21 DEGREES
RBC # BLD AUTO: 4.13 X10*6/UL (ref 4.5–5.9)
SODIUM SERPL-SCNC: 132 MMOL/L (ref 136–145)
T AXIS: 34 DEGREES
T OFFSET: 447 MS
VENTRICULAR RATE: 62 BPM
WBC # BLD AUTO: 13.4 X10*3/UL (ref 4.4–11.3)

## 2024-06-28 PROCEDURE — 2500000002 HC RX 250 W HCPCS SELF ADMINISTERED DRUGS (ALT 637 FOR MEDICARE OP, ALT 636 FOR OP/ED): Performed by: SURGERY

## 2024-06-28 PROCEDURE — C9113 INJ PANTOPRAZOLE SODIUM, VIA: HCPCS | Performed by: SURGERY

## 2024-06-28 PROCEDURE — 99233 SBSQ HOSP IP/OBS HIGH 50: CPT | Performed by: STUDENT IN AN ORGANIZED HEALTH CARE EDUCATION/TRAINING PROGRAM

## 2024-06-28 PROCEDURE — 71045 X-RAY EXAM CHEST 1 VIEW: CPT | Performed by: RADIOLOGY

## 2024-06-28 PROCEDURE — 2500000004 HC RX 250 GENERAL PHARMACY W/ HCPCS (ALT 636 FOR OP/ED): Performed by: SURGERY

## 2024-06-28 PROCEDURE — 36415 COLL VENOUS BLD VENIPUNCTURE: CPT | Performed by: SURGERY

## 2024-06-28 PROCEDURE — 2500000004 HC RX 250 GENERAL PHARMACY W/ HCPCS (ALT 636 FOR OP/ED): Performed by: INTERNAL MEDICINE

## 2024-06-28 PROCEDURE — 93005 ELECTROCARDIOGRAM TRACING: CPT

## 2024-06-28 PROCEDURE — 80053 COMPREHEN METABOLIC PANEL: CPT | Performed by: SURGERY

## 2024-06-28 PROCEDURE — 93010 ELECTROCARDIOGRAM REPORT: CPT | Performed by: INTERNAL MEDICINE

## 2024-06-28 PROCEDURE — 2500000001 HC RX 250 WO HCPCS SELF ADMINISTERED DRUGS (ALT 637 FOR MEDICARE OP): Performed by: SURGERY

## 2024-06-28 PROCEDURE — 1100000001 HC PRIVATE ROOM DAILY

## 2024-06-28 PROCEDURE — 85025 COMPLETE CBC W/AUTO DIFF WBC: CPT | Performed by: SURGERY

## 2024-06-28 PROCEDURE — 82947 ASSAY GLUCOSE BLOOD QUANT: CPT

## 2024-06-28 PROCEDURE — 71045 X-RAY EXAM CHEST 1 VIEW: CPT

## 2024-06-28 RX ORDER — ENOXAPARIN SODIUM 100 MG/ML
40 INJECTION SUBCUTANEOUS DAILY
Status: DISPENSED | OUTPATIENT
Start: 2024-06-28

## 2024-06-28 RX ORDER — PANTOPRAZOLE SODIUM 40 MG/10ML
40 INJECTION, POWDER, LYOPHILIZED, FOR SOLUTION INTRAVENOUS DAILY
Status: DISPENSED | OUTPATIENT
Start: 2024-06-28

## 2024-06-28 RX ORDER — MORPHINE SULFATE 2 MG/ML
2 INJECTION, SOLUTION INTRAMUSCULAR; INTRAVENOUS
Status: DISCONTINUED | OUTPATIENT
Start: 2024-06-28 | End: 2024-06-29

## 2024-06-28 RX ORDER — MORPHINE SULFATE 4 MG/ML
4 INJECTION INTRAVENOUS
Status: DISCONTINUED | OUTPATIENT
Start: 2024-06-28 | End: 2024-06-29

## 2024-06-28 RX ADMIN — CEFEPIME 2 G: 1 INJECTION, SOLUTION INTRAVENOUS at 21:25

## 2024-06-28 RX ADMIN — EZETIMIBE 10 MG: 10 TABLET ORAL at 10:16

## 2024-06-28 RX ADMIN — MORPHINE SULFATE 4 MG: 4 INJECTION INTRAVENOUS at 22:11

## 2024-06-28 RX ADMIN — POLYETHYLENE GLYCOL 3350 17 G: 17 POWDER, FOR SOLUTION ORAL at 10:16

## 2024-06-28 RX ADMIN — METRONIDAZOLE 500 MG: 500 INJECTION, SOLUTION INTRAVENOUS at 18:36

## 2024-06-28 RX ADMIN — ROSUVASTATIN CALCIUM 20 MG: 20 TABLET, FILM COATED ORAL at 10:16

## 2024-06-28 RX ADMIN — PANTOPRAZOLE SODIUM 40 MG: 40 INJECTION, POWDER, FOR SOLUTION INTRAVENOUS at 21:25

## 2024-06-28 RX ADMIN — SODIUM CHLORIDE 500 ML: 9 INJECTION, SOLUTION INTRAVENOUS at 14:07

## 2024-06-28 RX ADMIN — MORPHINE SULFATE 4 MG: 4 INJECTION INTRAVENOUS at 10:15

## 2024-06-28 RX ADMIN — METRONIDAZOLE 500 MG: 500 INJECTION, SOLUTION INTRAVENOUS at 10:16

## 2024-06-28 RX ADMIN — DEXTROSE AND SODIUM CHLORIDE 50 ML/HR: 5; 900 INJECTION, SOLUTION INTRAVENOUS at 00:44

## 2024-06-28 RX ADMIN — ONDANSETRON 4 MG: 2 INJECTION INTRAMUSCULAR; INTRAVENOUS at 14:51

## 2024-06-28 RX ADMIN — METRONIDAZOLE 500 MG: 500 INJECTION, SOLUTION INTRAVENOUS at 02:17

## 2024-06-28 RX ADMIN — ENOXAPARIN SODIUM 40 MG: 40 INJECTION SUBCUTANEOUS at 10:16

## 2024-06-28 RX ADMIN — MORPHINE SULFATE 4 MG: 4 INJECTION INTRAVENOUS at 18:08

## 2024-06-28 RX ADMIN — MORPHINE SULFATE 4 MG: 4 INJECTION INTRAVENOUS at 14:57

## 2024-06-28 RX ADMIN — MORPHINE SULFATE 4 MG: 4 INJECTION INTRAVENOUS at 05:48

## 2024-06-28 RX ADMIN — CEFEPIME 2 G: 1 INJECTION, SOLUTION INTRAVENOUS at 14:07

## 2024-06-28 RX ADMIN — DEXTROSE AND SODIUM CHLORIDE 100 ML/HR: 5; 900 INJECTION, SOLUTION INTRAVENOUS at 17:18

## 2024-06-28 RX ADMIN — CEFEPIME 2 G: 1 INJECTION, SOLUTION INTRAVENOUS at 05:40

## 2024-06-28 RX ADMIN — METOPROLOL SUCCINATE 25 MG: 25 TABLET, EXTENDED RELEASE ORAL at 10:16

## 2024-06-28 ASSESSMENT — PAIN SCALES - GENERAL
PAINLEVEL_OUTOF10: 10 - WORST POSSIBLE PAIN
PAINLEVEL_OUTOF10: 9
PAINLEVEL_OUTOF10: 8
PAINLEVEL_OUTOF10: 10 - WORST POSSIBLE PAIN
PAINLEVEL_OUTOF10: 5 - MODERATE PAIN
PAINLEVEL_OUTOF10: 4
PAINLEVEL_OUTOF10: 5 - MODERATE PAIN
PAINLEVEL_OUTOF10: 4
PAINLEVEL_OUTOF10: 8
PAINLEVEL_OUTOF10: 5 - MODERATE PAIN

## 2024-06-28 ASSESSMENT — COGNITIVE AND FUNCTIONAL STATUS - GENERAL
MOBILITY SCORE: 13
HELP NEEDED FOR BATHING: A LITTLE
EATING MEALS: A LITTLE
WALKING IN HOSPITAL ROOM: A LOT
HELP NEEDED FOR BATHING: A LOT
STANDING UP FROM CHAIR USING ARMS: A LOT
TURNING FROM BACK TO SIDE WHILE IN FLAT BAD: A LITTLE
DRESSING REGULAR LOWER BODY CLOTHING: A LOT
DRESSING REGULAR LOWER BODY CLOTHING: A LITTLE
MOVING TO AND FROM BED TO CHAIR: A LOT
PERSONAL GROOMING: A LOT
DRESSING REGULAR UPPER BODY CLOTHING: A LITTLE
MOBILITY SCORE: 15
WALKING IN HOSPITAL ROOM: A LOT
STANDING UP FROM CHAIR USING ARMS: A LOT
TOILETING: A LOT
MOVING FROM LYING ON BACK TO SITTING ON SIDE OF FLAT BED WITH BEDRAILS: A LITTLE
CLIMB 3 TO 5 STEPS WITH RAILING: A LOT
DAILY ACTIVITIY SCORE: 21
DAILY ACTIVITIY SCORE: 13
TURNING FROM BACK TO SIDE WHILE IN FLAT BAD: A LOT
CLIMB 3 TO 5 STEPS WITH RAILING: A LOT
DRESSING REGULAR UPPER BODY CLOTHING: A LOT
MOVING FROM LYING ON BACK TO SITTING ON SIDE OF FLAT BED WITH BEDRAILS: A LITTLE
MOVING TO AND FROM BED TO CHAIR: A LITTLE

## 2024-06-28 ASSESSMENT — PAIN - FUNCTIONAL ASSESSMENT
PAIN_FUNCTIONAL_ASSESSMENT: 0-10
PAIN_FUNCTIONAL_ASSESSMENT: 0-10

## 2024-06-28 ASSESSMENT — PAIN DESCRIPTION - ORIENTATION
ORIENTATION: MID
ORIENTATION: MID

## 2024-06-28 ASSESSMENT — PAIN DESCRIPTION - LOCATION
LOCATION: ABDOMEN

## 2024-06-28 ASSESSMENT — ACTIVITIES OF DAILY LIVING (ADL): LACK_OF_TRANSPORTATION: NO

## 2024-06-28 NOTE — PROGRESS NOTES
"Jatinder Keenan is a 63 y.o. male on day 5 of admission presenting with Diverticulitis of colon with perforation.    Subjective   Pt continues to complain of pain. He cannot move much at all.       Objective     Physical Exam  Vitals and nursing note reviewed.   Constitutional:       General: He is not in acute distress.     Appearance: Normal appearance. He is ill-appearing. He is not toxic-appearing.   HENT:      Head: Normocephalic and atraumatic.      Nose: Nose normal.      Mouth/Throat:      Mouth: Mucous membranes are moist.   Eyes:      Extraocular Movements: Extraocular movements intact.      Conjunctiva/sclera: Conjunctivae normal.      Pupils: Pupils are equal, round, and reactive to light.   Cardiovascular:      Rate and Rhythm: Normal rate and regular rhythm.      Heart sounds: No murmur heard.     No gallop.   Pulmonary:      Effort: Pulmonary effort is normal. No respiratory distress.      Breath sounds: Normal breath sounds. No wheezing, rhonchi or rales.   Abdominal:      General: Abdomen is flat. There is no distension.      Palpations: Abdomen is soft. There is no mass.      Tenderness: There is abdominal tenderness.      Comments: Surgical site: dressing: some drainage appreciated, +ostomy   Musculoskeletal:         General: No swelling or tenderness. Normal range of motion.      Cervical back: Normal range of motion and neck supple.   Skin:     General: Skin is warm and dry.   Neurological:      General: No focal deficit present.      Mental Status: He is alert and oriented to person, place, and time. Mental status is at baseline.   Psychiatric:         Mood and Affect: Mood normal.         Behavior: Behavior normal.         Thought Content: Thought content normal.         Judgment: Judgment normal.         Last Recorded Vitals:  /78 (BP Location: Left arm, Patient Position: Lying)   Pulse 62   Temp 36.4 °C (97.5 °F) (Temporal)   Resp 16   Ht 1.702 m (5' 7\")   Wt 74.8 kg (165 " lb)   SpO2 94%   BMI 25.84 kg/m²      Scheduled medications:  [Held by provider] aspirin, 81 mg, oral, Daily  bisacodyl, 10 mg, rectal, Daily  cefepime, 2 g, intravenous, q8h  enoxaparin, 40 mg, subcutaneous, Daily  ezetimibe, 10 mg, oral, Daily  metoprolol succinate XL, 25 mg, oral, Daily  metroNIDAZOLE, 500 mg, intravenous, q8h  polyethylene glycol, 17 g, oral, Daily  rosuvastatin, 20 mg, oral, Daily      Continuous medications:  D5 % and 0.9 % sodium chloride, 100 mL/hr, Last Rate: 100 mL/hr (06/28/24 0730)        PRN medications:  PRN medications: HYDROmorphone, morphine, morphine, nitroglycerin, ondansetron **OR** ondansetron     Relevant Results:  Results for orders placed or performed during the hospital encounter of 06/23/24 (from the past 24 hour(s))   Comprehensive Metabolic Panel   Result Value Ref Range    Glucose 121 (H) 74 - 99 mg/dL    Sodium 132 (L) 136 - 145 mmol/L    Potassium 4.2 3.5 - 5.3 mmol/L    Chloride 99 98 - 107 mmol/L    Bicarbonate 27 21 - 32 mmol/L    Anion Gap 10 10 - 20 mmol/L    Urea Nitrogen 14 6 - 23 mg/dL    Creatinine 0.76 0.50 - 1.30 mg/dL    eGFR >90 >60 mL/min/1.73m*2    Calcium 8.6 8.6 - 10.3 mg/dL    Albumin 3.1 (L) 3.4 - 5.0 g/dL    Alkaline Phosphatase 44 33 - 136 U/L    Total Protein 6.3 (L) 6.4 - 8.2 g/dL    AST 17 9 - 39 U/L    Bilirubin, Total 0.4 0.0 - 1.2 mg/dL    ALT 19 10 - 52 U/L   CBC and Auto Differential   Result Value Ref Range    WBC 13.4 (H) 4.4 - 11.3 x10*3/uL    nRBC 0.0 0.0 - 0.0 /100 WBCs    RBC 4.13 (L) 4.50 - 5.90 x10*6/uL    Hemoglobin 13.6 13.5 - 17.5 g/dL    Hematocrit 39.8 (L) 41.0 - 52.0 %    MCV 96 80 - 100 fL    MCH 32.9 26.0 - 34.0 pg    MCHC 34.2 32.0 - 36.0 g/dL    RDW 11.0 (L) 11.5 - 14.5 %    Platelets 441 150 - 450 x10*3/uL    Neutrophils % 75.7 40.0 - 80.0 %    Immature Granulocytes %, Automated 1.0 (H) 0.0 - 0.9 %    Lymphocytes % 15.2 13.0 - 44.0 %    Monocytes % 7.2 2.0 - 10.0 %    Eosinophils % 0.6 0.0 - 6.0 %    Basophils % 0.3 0.0  - 2.0 %    Neutrophils Absolute 10.14 (H) 1.20 - 7.70 x10*3/uL    Immature Granulocytes Absolute, Automated 0.13 0.00 - 0.70 x10*3/uL    Lymphocytes Absolute 2.04 1.20 - 4.80 x10*3/uL    Monocytes Absolute 0.97 0.10 - 1.00 x10*3/uL    Eosinophils Absolute 0.08 0.00 - 0.70 x10*3/uL    Basophils Absolute 0.04 0.00 - 0.10 x10*3/uL       XR abdomen 1 view    Result Date: 6/27/2024  Interpreted By:  Roni Gill, STUDY: XR ABDOMEN 1 VIEW;  6/27/2024 4:07 pm   INDICATION: Signs/Symptoms:NG tube placement.   COMPARISON: None.   ACCESSION NUMBER(S): [id number]   ORDERING CLINICIAN: RAMSES FELIZ   FINDINGS: NG tube is in place with the tip in the proximal body of the stomach along the greater curvature. There is a nonobstructive bowel gas pattern. Skin staples are noted over the lower abdominal region above the symphysis in the midline. Ostomy device is visible overlying the left lower quadrant.   Visualized lungs show minimal basal subsegmental atelectasis.   Osseous structures demonstrate no acute bony changes.       1.  NG tube present in the stomach as described above. Postoperative changes noted as well.   MACRO: None   Signed by: Roni Gill 6/27/2024 4:42 PM Dictation workstation:   [id number]    CT abdomen pelvis wo IV contrast    Result Date: 6/26/2024  Interpreted By:  Jc Elaine, STUDY: CT ABDOMEN PELVIS WO IV CONTRAST;  6/26/2024 5:19 pm   INDICATION: Signs/Symptoms:increase in abd pain.   COMPARISON: Selected CT abdomen and pelvis examinations as far back as March 26, 2017 including June 23, 2024 CT examination. Images from 05/20/2024 CT-guided pelvic aspiration procedure.   ACCESSION NUMBER(S): [id number]   ORDERING CLINICIAN: PILO JORDAN   TECHNIQUE: CT of the abdomen and pelvis was performed. Contiguous axial images were obtained at 3 mm slice thickness through the abdomen and pelvis. Coronal and sagittal reconstructions at 3 mm slice thickness were performed.  No intravenous or oral  contrast agents were administered.   FINDINGS: Please note that the evaluation of vessels, lymph nodes and organs is limited without intravenous contrast.   LOWER CHEST: Increase bronchial impaction right lower lobe. Bibasilar discoid atelectasis increased on the right.   ABDOMEN:   LIVER: Similar appearance with mild scattered homogeneous hypodensities most compatible with cysts although some are too small to characterize.   BILE DUCTS: Normal caliber.   GALLBLADDER: Nondistended containing radiopaque material likely vicarious excretion of contrast.   PANCREAS: Within normal limits.   SPLEEN: Within normal limits.   ADRENAL GLANDS: Within normal limits.   KIDNEYS AND URETERS: The kidneys are normal in size and unremarkable in appearance.  No hydroureteronephrosis or nephroureterolithiasis is identified.   PELVIS:   BLADDER: Unremarkable   REPRODUCTIVE ORGANS: Prostate minimally indents the base urinary bladder.   BOWEL: Persistent segmental wall thickening involving the sigmoid colon with indistinctness of the surrounding fat compatible with inflammation. There is persistent lack of normal visualization of a portion of the superior wall at site of prior abscess drainage series 203, image 67 with potential contained perforation without remaining drainable abscess. There is similar thickening of the adjacent peritoneum. There is similar prominence of the adjacent lymph nodes including lymph node anterior to the iliac bifurcation measuring 12 mm transverse diameter series 21, image 91. Findings remain compatible with complicated diverticulitis with potential adjacent peritonitis. Underlying mass not reliably excluded. The proximal bowel is nondistended.   VESSELS: Scattered arterial vascular calcifications.   PERITONEUM/RETROPERITONEUM: Minimal persistent free fluid dependent portion of the pelvis inferior to the sigmoid colon coronal series 202, image 73.   ABDOMINAL WALL: Similar indirect fat containing inguinal  hernias.   BONES: No acute osseous abnormalities.       1.  Findings compatible with complex sigmoid diverticulitis with persistent segmental wall thickening, and wall abnormality with suggestion of small contained perforation without residual abscess with similar adjacent mesenteric inflammation and peritonitis. 2. Similar adjacent prominent lymph nodes. 3. Underlying mass not reliably excluded. 4. Increase mucous impaction right lower lobe with increase adjacent subsegmental atelectasis.   Jc Elaine discussed the significance and urgency of this critical finding by secure chat with  PILO JORDAN on 6/26/2024 at 7:08 pm.  (**-RCF-**) Findings:  See findings.   Signed by: Jc Elaine 6/26/2024 7:08 PM Dictation workstation:   [id number]           Assessment/Plan   Principal Problem:    Diverticulitis of colon with perforation  Active Problems:    Diverticulitis of large intestine with abscess without bleeding    Jatinder Keenan is a 63 y.o. male Ex smoker with a history of CAD s/p MI and PCI to RCA x 2 (2016), HTN, HLD, remote PE, and CVA (not on Eliquis)  who presented to the hospital for constipation and abdominal pain and was found to have acute diverticulitis with perforation     Acute diverticulitis with perforation/sigmoid abscess sp I and D by IR 6/25, sp sigmoid resection, colostomy  - surg following  - cefepime and Flagyl  - ID following  - IVF  - morphine PRN for pain  - dilaudid PRN for breakthrough pain    Normocytic anemia  resolved    Leukocytosis  - monitor    CAD  - crestor  - zetia  - hold ASA    HTN  BP elevated   - resume metoprolol     DVT prophylaxis: SCD, hold SQ hep  Dispo: monitor clinically       Sushila Ferguson MD  Hospitalist

## 2024-06-28 NOTE — NURSING NOTE
2130- pt complaining of NG tube and says he wants it out. Educated pt on necessity of having the NG and the doctor wanted to just keep it overnight at least.  2250- pt states that the NG tube is too uncomfortable for him, it hurts and is affecting his breathing. Pt states that he needs to see the doctor now and can not wait until morning. Dr. Murguia notified and asked to come see the pt.  2355- Dr. Murguia at pts bedside.   0000- Shantal said it would be okay to remove the NG tube but stressed to the pt that if any acute problems arise it would have to be reinserted, patient verbalized understanding. Shantal also started pt on IVFs since pt is NPO.  0005- NG tube removed.  0044- New IVFs started.

## 2024-06-28 NOTE — PROGRESS NOTES
06/28/24 0819   Discharge Planning   Living Arrangements Spouse/significant other   Support Systems Spouse/significant other   Type of Residence Private residence   Number of Stairs to Enter Residence 2   Number of Stairs Within Residence 14   Do you have animals or pets at home? No   Who is requesting discharge planning? Provider   Patient expects to be discharged to: [facility] is able to accept. Earliest start of care is Tuesday 7/2.   Financial Resource Strain   How hard is it for you to pay for the very basics like food, housing, medical care, and heating? Not hard   Housing Stability   In the last 12 months, was there a time when you were not able to pay the mortgage or rent on time? N   In the last 12 months, how many places have you lived? 1   In the last 12 months, was there a time when you did not have a steady place to sleep or slept in a shelter (including now)? N   Transportation Needs   In the past 12 months, has lack of transportation kept you from medical appointments or from getting medications? no   In the past 12 months, has lack of transportation kept you from meetings, work, or from getting things needed for daily living? No

## 2024-06-28 NOTE — NURSING NOTE
"Ostomy RN Note:    Patient seen today in bed, drowsy, wife at bedside, states, \"he has been resting all day\".  SCD's placed back on patient after verifying still ordered.  Ostomy appliance appears intact, no leaking, small amount of brown liquid stool in pouch.  Reviewed all educational materials left in room from yesterday with patient's wife. Support and encouragement given to wife. Nurse to change midline dressing.  Will see patient Monday for further education and changing ostomy.  "

## 2024-06-28 NOTE — CARE PLAN
Problem: Pain - Adult  Goal: Verbalizes/displays adequate comfort level or baseline comfort level  Flowsheets (Taken 6/28/2024 0800)  Verbalizes/displays adequate comfort level or baseline comfort level:   Encourage patient to monitor pain and request assistance   Assess pain using appropriate pain scale     Problem: Safety - Adult  Goal: Free from fall injury  Flowsheets (Taken 6/28/2024 0800)  Free from fall injury: Instruct family/caregiver on patient safety     Problem: Respiratory  Goal: Minimize anxiety/maximize coping throughout shift  Flowsheets (Taken 6/28/2024 0800)  Minimize anxiety/maximize coping throughout shift: Monitor pain/anxiety level  Goal: Minimal/no exertional discomfort or dyspnea this shift  Outcome: Progressing  Goal: No signs of respiratory distress (eg. Use of accessory muscles. Peds grunting)  Outcome: Progressing  Goal: Increase self care and/or family involvement in next 24 hours  Flowsheets (Taken 6/28/2024 0800)  Increase self care and/or family involvement in next 24 hours: Encourage activity/mobility     Problem: Skin  Goal: Participates in plan/prevention/treatment measures  Outcome: Progressing  Flowsheets (Taken 6/28/2024 1702)  Participates in plan/prevention/treatment measures: Elevate heels  Note: Preventive pressure wound education reviewed with pt and family  Goal: Prevent/manage excess moisture  Outcome: Progressing  Flowsheets (Taken 6/28/2024 1702)  Prevent/manage excess moisture: Cleanse incontinence/protect with barrier cream  Goal: Prevent/minimize sheer/friction injuries  Outcome: Progressing  Flowsheets (Taken 6/28/2024 1702)  Prevent/minimize sheer/friction injuries:   HOB 30 degrees or less   Turn/reposition every 2 hours/use positioning/transfer devices  Goal: Promote/optimize nutrition  Outcome: Progressing   The patient's goals for the shift include      The clinical goals for the shift include Pt would allow q2 turns this shift  Progressing  Pt educated on  pressure injury prevention, Pt refused turns in the am, Family came in and was educated,  pt allowed turns at that time.   1900: Pt able to tolerate some clears today without nausea. Pt needed pain medication frequently today, but pain was controlled. Dressing was changed per order.

## 2024-06-28 NOTE — PROGRESS NOTES
Patient: Jatinder Keenan Age: 63 y.o.   Gender: male Room/bed: 122/122-A     Attending: Sushila Ferguson MD  Code Status:  Full Code    Subjective  No OVN. No fever. Still having abd pain.no n/v. No BM.     Objective:  Physical Exam  Constitutional:       General: He is not in acute distress.     Appearance: Normal appearance.   HENT:      Head: Normocephalic and atraumatic.      Right Ear: Tympanic membrane, ear canal and external ear normal.      Left Ear: Tympanic membrane, ear canal and external ear normal.      Nose: Nose normal.      Mouth/Throat:      Mouth: Mucous membranes are moist.      Pharynx: Oropharynx is clear.   Eyes:      General: No scleral icterus.     Extraocular Movements: Extraocular movements intact.      Conjunctiva/sclera: Conjunctivae normal.      Pupils: Pupils are equal, round, and reactive to light.   Cardiovascular:      Rate and Rhythm: Normal rate and regular rhythm.      Pulses: Normal pulses.      Heart sounds: Normal heart sounds. No murmur heard.     No gallop.   Pulmonary:      Effort: Pulmonary effort is normal.      Breath sounds: Normal breath sounds.   Abdominal:      General: Abdomen is flat. Bowel sounds are normal.      Palpations: Abdomen is soft.      Tenderness: There is abdominal tenderness. There is no guarding or rebound.   Musculoskeletal:         General: Normal range of motion.      Cervical back: Normal range of motion and neck supple.      Right lower leg: No edema.      Left lower leg: No edema.   Skin:     General: Skin is warm and dry.      Capillary Refill: Capillary refill takes less than 2 seconds.      Coloration: Skin is not jaundiced or pale.      Findings: No bruising, erythema, lesion or rash.   Neurological:      General: No focal deficit present.      Mental Status: He is alert and oriented to person, place, and time. Mental status is at baseline.      Deep Tendon Reflexes: Reflexes normal.   Psychiatric:         Mood and Affect: Mood normal.          Behavior: Behavior normal.          Temp:  [36.4 °C (97.5 °F)-36.8 °C (98.2 °F)] 36.4 °C (97.5 °F)  Heart Rate:  [50-62] 62  Resp:  [16-17] 16  BP: (133-168)/(78-85) 168/78      Intake/Output Summary (Last 24 hours) at 6/28/2024 1139  Last data filed at 6/28/2024 1016  Gross per 24 hour   Intake 1138.33 ml   Output 2500 ml   Net -1361.67 ml       Vitals:    06/25/24 0958   Weight: 74.8 kg (165 lb)             I/Os    Intake/Output Summary (Last 24 hours) at 6/28/2024 1139  Last data filed at 6/28/2024 1016  Gross per 24 hour   Intake 1138.33 ml   Output 2500 ml   Net -1361.67 ml       Labs:   CBC:  Recent Labs     06/28/24  0749 06/27/24  0648 06/26/24  1830   WBC 13.4* 11.7* 9.5   HGB 13.6 13.1* 13.3*   HCT 39.8* 38.7* 39.1*    405 382   MCV 96 97 96     CMP:  Recent Labs     06/28/24  0749 06/27/24  0648 06/26/24  0500   * 133* 137   K 4.2 4.6 5.1   CL 99 100 102   CO2 27 25 27   ANIONGAP 10 13 13   BUN 14 10 10   CREATININE 0.76 0.76 0.77   EGFR >90 >90 >90   GLUCOSE 121* 136* 80     Recent Labs     06/28/24  0749 06/27/24  0648 06/26/24  0500   ALBUMIN 3.1* 3.2* 3.2*   ALKPHOS 44 47 45   ALT 19 17 16   AST 17 16 19   BILITOT 0.4 0.4 0.4     Calcium/Phos:   Lab Results   Component Value Date    CALCIUM 8.6 06/28/2024    PHOS 3.2 06/25/2024      COAG:   Recent Labs     06/24/24  0525 04/29/18  2030 02/02/18  1330   INR 1.4* 1.1 1.0     CRP:   Lab Results   Component Value Date    CRP 12.31 (H) 06/23/2024      [unfilled]   ENDO:  Recent Labs     04/30/18  0622   HGBA1C 6.4      CARDIAC:   Recent Labs     04/30/18  0622   BNP 10     Recent Labs     06/20/22  0930 06/16/21  0737 07/17/19  1221   CHOL 82 99 93   LDLF 30 43 32   HDL 42.5 36.4* 30.1*   TRIG 46 96 153*     No data recorded    Micro/ID:   Susceptibility data from last 90 days.  Collected Specimen Info Organism   06/25/24 Fluid from Aspirate Mixed Anaerobic Bacteria     Mixed Gram-Positive Bacteria                     No lab exists  "for component: \"AGALPCRNB\"   .ID  Lab Results   Component Value Date    BLOODCULT No growth at 4 days -  FINAL REPORT 06/23/2024    BLOODCULT No growth at 4 days -  FINAL REPORT 06/23/2024       Images:  XR abdomen 1 view  Narrative: Interpreted By:  Roni Gill,   STUDY:  XR ABDOMEN 1 VIEW;  6/27/2024 4:07 pm      INDICATION:  Signs/Symptoms:NG tube placement.      COMPARISON:  None.      ACCESSION NUMBER(S):  [id number]      ORDERING CLINICIAN:  RAMSES FELIZ      FINDINGS:  NG tube is in place with the tip in the proximal body of the stomach  along the greater curvature. There is a nonobstructive bowel gas  pattern. Skin staples are noted over the lower abdominal region above  the symphysis in the midline. Ostomy device is visible overlying the  left lower quadrant.      Visualized lungs show minimal basal subsegmental atelectasis.      Osseous structures demonstrate no acute bony changes.      Impression: 1.  NG tube present in the stomach as described above. Postoperative  changes noted as well.      MACRO:  None      Signed by: Roni Gill 6/27/2024 4:42 PM  Dictation workstation:   [id number]       Medications:  Scheduled medications  [Held by provider] aspirin, 81 mg, oral, Daily  bisacodyl, 10 mg, rectal, Daily  cefepime, 2 g, intravenous, q8h  enoxaparin, 40 mg, subcutaneous, Daily  ezetimibe, 10 mg, oral, Daily  metoprolol succinate XL, 25 mg, oral, Daily  metroNIDAZOLE, 500 mg, intravenous, q8h  polyethylene glycol, 17 g, oral, Daily  rosuvastatin, 20 mg, oral, Daily      Continuous medications  D5 % and 0.9 % sodium chloride, 100 mL/hr, Last Rate: 100 mL/hr (06/28/24 0730)        PRN medications  PRN medications: HYDROmorphone, morphine, morphine, nitroglycerin, ondansetron **OR** ondansetron     Assessment and Plan:  Jatinder Keenan is a 63 y.o. male presenting for acute diverticulitis with perforation and wall abscess sp sigmoid resection with colostomy creation.   "   Assessment:  #acute diverticulitis with perforation and abscess in the wall of sigmoid colon sp Ct guided aspiration and subsequent sigmoid resection with colostomy creation. Blood cxs NTD.      Recommendations:   -continue cefepime and flagyl  -IS, mobilize     Curt Smith DO   PGY-2, IM         Attending note : the patient was evaluated, the note was reviewed and updated  Sigmoid diverticulitis / abscess, sp ct drainage, free communication to colon cavity, sp surgery,the culture with mixed re  Recommendations :  Continue Cefepime and Flagyl  Discussed with the medical team     Andressa Chery M.D

## 2024-06-28 NOTE — CARE PLAN
Problem: Pain - Adult  Goal: Verbalizes/displays adequate comfort level or baseline comfort level  Flowsheets (Taken 6/28/2024 0800)  Verbalizes/displays adequate comfort level or baseline comfort level:   Encourage patient to monitor pain and request assistance   Assess pain using appropriate pain scale     Problem: Safety - Adult  Goal: Free from fall injury  Flowsheets (Taken 6/28/2024 0800)  Free from fall injury: Instruct family/caregiver on patient safety     Problem: Respiratory  Goal: Minimize anxiety/maximize coping throughout shift  Flowsheets (Taken 6/28/2024 0800)  Minimize anxiety/maximize coping throughout shift: Monitor pain/anxiety level  Goal: Minimal/no exertional discomfort or dyspnea this shift  Outcome: Progressing  Goal: No signs of respiratory distress (eg. Use of accessory muscles. Peds grunting)  Outcome: Progressing  Goal: Increase self care and/or family involvement in next 24 hours  Flowsheets (Taken 6/28/2024 0800)  Increase self care and/or family involvement in next 24 hours: Encourage activity/mobility     Problem: Respiratory  Goal: Minimize anxiety/maximize coping throughout shift  Flowsheets (Taken 6/28/2024 0800)  Minimize anxiety/maximize coping throughout shift: Monitor pain/anxiety level  Goal: Minimal/no exertional discomfort or dyspnea this shift  Outcome: Progressing  Goal: No signs of respiratory distress (eg. Use of accessory muscles. Peds grunting)  Outcome: Progressing  Goal: Increase self care and/or family involvement in next 24 hours  Flowsheets (Taken 6/28/2024 0800)  Increase self care and/or family involvement in next 24 hours: Encourage activity/mobility       The clinical goals for the shift include Pt would allow q2 turns this shift  Progressing  Pt refused turns for first few hours of shift, family came in and talked with pt to allow turns.  Pt and family educated on prevention of pressure wounds,

## 2024-06-28 NOTE — PROGRESS NOTES
"Jatinder Keenan is a 63 y.o. male on day 5 of admission presenting with Diverticulitis of colon with perforation.    Subjective   Pt5 states he had a terrible time with the NGT during the night  Couldn't breathe  Staff removed during the night       Objective   VSS  Physical Exam  HEENT NR  abd soft  Dressing dry  Ostomy pink no flatus  Last Recorded Vitals  Blood pressure 133/82, pulse 50, temperature 36.7 °C (98.1 °F), temperature source Temporal, resp. rate 16, height 1.702 m (5' 7\"), weight 74.8 kg (165 lb), SpO2 94%.  Intake/Output last 3 Shifts:  I/O last 3 completed shifts:  In: 3700 (49.4 mL/kg) [P.O.:200; I.V.:2900 (38.7 mL/kg); IV Piggyback:600]  Out: 1770 (23.6 mL/kg) [Urine:1750 (0.6 mL/kg/hr); Blood:20]  Weight: 74.8 kg     Relevant Results              Wbc slightly elevated rest of labs pending  May be dry               Assessment/Plan   Principal Problem:    Diverticulitis of colon with perforation  Active Problems:    Diverticulitis of large intestine with abscess without bleeding    S/p colectomy with colostomy  Await return of gi fxn         I spent 15 minutes in the professional and overall care of this patient.      Pallavi Nunn MD      "

## 2024-06-29 LAB
ALBUMIN SERPL BCP-MCNC: 3.1 G/DL (ref 3.4–5)
ALP SERPL-CCNC: 40 U/L (ref 33–136)
ALT SERPL W P-5'-P-CCNC: 16 U/L (ref 10–52)
ANION GAP SERPL CALC-SCNC: 11 MMOL/L (ref 10–20)
AST SERPL W P-5'-P-CCNC: 11 U/L (ref 9–39)
BASOPHILS # BLD AUTO: 0.06 X10*3/UL (ref 0–0.1)
BASOPHILS NFR BLD AUTO: 0.6 %
BILIRUB SERPL-MCNC: 0.4 MG/DL (ref 0–1.2)
BUN SERPL-MCNC: 9 MG/DL (ref 6–23)
CALCIUM SERPL-MCNC: 8.7 MG/DL (ref 8.6–10.3)
CHLORIDE SERPL-SCNC: 100 MMOL/L (ref 98–107)
CO2 SERPL-SCNC: 26 MMOL/L (ref 21–32)
CREAT SERPL-MCNC: 0.75 MG/DL (ref 0.5–1.3)
EGFRCR SERPLBLD CKD-EPI 2021: >90 ML/MIN/1.73M*2
EOSINOPHIL # BLD AUTO: 0.34 X10*3/UL (ref 0–0.7)
EOSINOPHIL NFR BLD AUTO: 3.3 %
ERYTHROCYTE [DISTWIDTH] IN BLOOD BY AUTOMATED COUNT: 11.1 % (ref 11.5–14.5)
GLUCOSE BLD MANUAL STRIP-MCNC: 98 MG/DL (ref 74–99)
GLUCOSE SERPL-MCNC: 98 MG/DL (ref 74–99)
HCT VFR BLD AUTO: 43.2 % (ref 41–52)
HGB BLD-MCNC: 14.3 G/DL (ref 13.5–17.5)
IMM GRANULOCYTES # BLD AUTO: 0.12 X10*3/UL (ref 0–0.7)
IMM GRANULOCYTES NFR BLD AUTO: 1.2 % (ref 0–0.9)
LYMPHOCYTES # BLD AUTO: 2.11 X10*3/UL (ref 1.2–4.8)
LYMPHOCYTES NFR BLD AUTO: 20.4 %
MCH RBC QN AUTO: 32.6 PG (ref 26–34)
MCHC RBC AUTO-ENTMCNC: 33.1 G/DL (ref 32–36)
MCV RBC AUTO: 99 FL (ref 80–100)
MONOCYTES # BLD AUTO: 0.98 X10*3/UL (ref 0.1–1)
MONOCYTES NFR BLD AUTO: 9.5 %
NEUTROPHILS # BLD AUTO: 6.75 X10*3/UL (ref 1.2–7.7)
NEUTROPHILS NFR BLD AUTO: 65 %
NRBC BLD-RTO: 0 /100 WBCS (ref 0–0)
PLATELET # BLD AUTO: 422 X10*3/UL (ref 150–450)
POTASSIUM SERPL-SCNC: 4.3 MMOL/L (ref 3.5–5.3)
PROT SERPL-MCNC: 6.3 G/DL (ref 6.4–8.2)
RBC # BLD AUTO: 4.38 X10*6/UL (ref 4.5–5.9)
SODIUM SERPL-SCNC: 133 MMOL/L (ref 136–145)
WBC # BLD AUTO: 10.4 X10*3/UL (ref 4.4–11.3)

## 2024-06-29 PROCEDURE — 1100000001 HC PRIVATE ROOM DAILY

## 2024-06-29 PROCEDURE — 2500000004 HC RX 250 GENERAL PHARMACY W/ HCPCS (ALT 636 FOR OP/ED): Performed by: SURGERY

## 2024-06-29 PROCEDURE — 2500000004 HC RX 250 GENERAL PHARMACY W/ HCPCS (ALT 636 FOR OP/ED): Mod: JZ | Performed by: SURGERY

## 2024-06-29 PROCEDURE — 99232 SBSQ HOSP IP/OBS MODERATE 35: CPT | Performed by: STUDENT IN AN ORGANIZED HEALTH CARE EDUCATION/TRAINING PROGRAM

## 2024-06-29 PROCEDURE — C9113 INJ PANTOPRAZOLE SODIUM, VIA: HCPCS | Performed by: SURGERY

## 2024-06-29 PROCEDURE — 36415 COLL VENOUS BLD VENIPUNCTURE: CPT | Performed by: SURGERY

## 2024-06-29 PROCEDURE — 80053 COMPREHEN METABOLIC PANEL: CPT | Performed by: SURGERY

## 2024-06-29 PROCEDURE — 85025 COMPLETE CBC W/AUTO DIFF WBC: CPT | Performed by: SURGERY

## 2024-06-29 PROCEDURE — 2500000001 HC RX 250 WO HCPCS SELF ADMINISTERED DRUGS (ALT 637 FOR MEDICARE OP): Performed by: STUDENT IN AN ORGANIZED HEALTH CARE EDUCATION/TRAINING PROGRAM

## 2024-06-29 PROCEDURE — 2500000001 HC RX 250 WO HCPCS SELF ADMINISTERED DRUGS (ALT 637 FOR MEDICARE OP): Performed by: SURGERY

## 2024-06-29 PROCEDURE — 82947 ASSAY GLUCOSE BLOOD QUANT: CPT

## 2024-06-29 PROCEDURE — 2500000002 HC RX 250 W HCPCS SELF ADMINISTERED DRUGS (ALT 637 FOR MEDICARE OP, ALT 636 FOR OP/ED): Performed by: SURGERY

## 2024-06-29 PROCEDURE — 99024 POSTOP FOLLOW-UP VISIT: CPT | Performed by: SURGERY

## 2024-06-29 RX ORDER — OXYCODONE HYDROCHLORIDE 10 MG/1
10 TABLET ORAL EVERY 4 HOURS PRN
Status: DISPENSED | OUTPATIENT
Start: 2024-06-29

## 2024-06-29 RX ORDER — OXYCODONE HYDROCHLORIDE 5 MG/1
5 TABLET ORAL EVERY 4 HOURS PRN
Status: DISPENSED | OUTPATIENT
Start: 2024-06-29

## 2024-06-29 RX ADMIN — OXYCODONE HYDROCHLORIDE 10 MG: 10 TABLET ORAL at 17:16

## 2024-06-29 RX ADMIN — ROSUVASTATIN CALCIUM 20 MG: 20 TABLET, FILM COATED ORAL at 09:44

## 2024-06-29 RX ADMIN — EZETIMIBE 10 MG: 10 TABLET ORAL at 09:44

## 2024-06-29 RX ADMIN — DEXTROSE AND SODIUM CHLORIDE 100 ML/HR: 5; 900 INJECTION, SOLUTION INTRAVENOUS at 10:47

## 2024-06-29 RX ADMIN — METRONIDAZOLE 500 MG: 500 INJECTION, SOLUTION INTRAVENOUS at 10:47

## 2024-06-29 RX ADMIN — ENOXAPARIN SODIUM 40 MG: 40 INJECTION SUBCUTANEOUS at 09:51

## 2024-06-29 RX ADMIN — METRONIDAZOLE 500 MG: 500 INJECTION, SOLUTION INTRAVENOUS at 02:18

## 2024-06-29 RX ADMIN — MORPHINE SULFATE 4 MG: 4 INJECTION INTRAVENOUS at 02:19

## 2024-06-29 RX ADMIN — MORPHINE SULFATE 4 MG: 4 INJECTION INTRAVENOUS at 09:40

## 2024-06-29 RX ADMIN — METOPROLOL SUCCINATE 25 MG: 25 TABLET, EXTENDED RELEASE ORAL at 09:44

## 2024-06-29 RX ADMIN — CEFEPIME 2 G: 1 INJECTION, SOLUTION INTRAVENOUS at 05:30

## 2024-06-29 RX ADMIN — PANTOPRAZOLE SODIUM 40 MG: 40 INJECTION, POWDER, FOR SOLUTION INTRAVENOUS at 09:44

## 2024-06-29 RX ADMIN — METRONIDAZOLE 500 MG: 500 INJECTION, SOLUTION INTRAVENOUS at 17:17

## 2024-06-29 RX ADMIN — CEFEPIME 2 G: 1 INJECTION, SOLUTION INTRAVENOUS at 14:28

## 2024-06-29 RX ADMIN — MORPHINE SULFATE 4 MG: 4 INJECTION INTRAVENOUS at 06:40

## 2024-06-29 RX ADMIN — POLYETHYLENE GLYCOL 3350 17 G: 17 POWDER, FOR SOLUTION ORAL at 09:42

## 2024-06-29 RX ADMIN — OXYCODONE HYDROCHLORIDE 10 MG: 10 TABLET ORAL at 12:26

## 2024-06-29 RX ADMIN — CEFEPIME 2 G: 1 INJECTION, SOLUTION INTRAVENOUS at 20:21

## 2024-06-29 ASSESSMENT — COGNITIVE AND FUNCTIONAL STATUS - GENERAL
DAILY ACTIVITIY SCORE: 22
HELP NEEDED FOR BATHING: A LITTLE
CLIMB 3 TO 5 STEPS WITH RAILING: A LITTLE
DAILY ACTIVITIY SCORE: 22
TURNING FROM BACK TO SIDE WHILE IN FLAT BAD: A LITTLE
TURNING FROM BACK TO SIDE WHILE IN FLAT BAD: A LITTLE
STANDING UP FROM CHAIR USING ARMS: A LITTLE
MOVING TO AND FROM BED TO CHAIR: A LITTLE
HELP NEEDED FOR BATHING: A LITTLE
STANDING UP FROM CHAIR USING ARMS: A LITTLE
MOVING TO AND FROM BED TO CHAIR: A LITTLE
WALKING IN HOSPITAL ROOM: A LITTLE
CLIMB 3 TO 5 STEPS WITH RAILING: A LITTLE
DRESSING REGULAR LOWER BODY CLOTHING: A LITTLE
DRESSING REGULAR LOWER BODY CLOTHING: A LITTLE
MOBILITY SCORE: 19
MOBILITY SCORE: 19
WALKING IN HOSPITAL ROOM: A LITTLE

## 2024-06-29 ASSESSMENT — PAIN SCALES - GENERAL
PAINLEVEL_OUTOF10: 7
PAINLEVEL_OUTOF10: 4
PAINLEVEL_OUTOF10: 5 - MODERATE PAIN
PAINLEVEL_OUTOF10: 7
PAINLEVEL_OUTOF10: 9
PAINLEVEL_OUTOF10: 7
PAINLEVEL_OUTOF10: 0 - NO PAIN
PAINLEVEL_OUTOF10: 3
PAINLEVEL_OUTOF10: 10 - WORST POSSIBLE PAIN
PAINLEVEL_OUTOF10: 5 - MODERATE PAIN
PAINLEVEL_OUTOF10: 4

## 2024-06-29 ASSESSMENT — PAIN DESCRIPTION - ORIENTATION
ORIENTATION: MID
ORIENTATION: MID

## 2024-06-29 ASSESSMENT — PAIN DESCRIPTION - LOCATION
LOCATION: ABDOMEN

## 2024-06-29 ASSESSMENT — PAIN - FUNCTIONAL ASSESSMENT
PAIN_FUNCTIONAL_ASSESSMENT: 0-10
PAIN_FUNCTIONAL_ASSESSMENT: 0-10

## 2024-06-29 NOTE — PROGRESS NOTES
Jatinder Keenan is a 63 y.o. male on day 6 of admission presenting with Diverticulitis of colon with perforation.    Subjective   Interval History: no fever, less pain, no emesis        Review of Systems    Objective   Range of Vitals (last 24 hours)  Heart Rate:  [54-72]   Temp:  [36.3 °C (97.3 °F)-36.7 °C (98.1 °F)]   Resp:  [15-17]   BP: (123-157)/(78-93)   SpO2:  [92 %-94 %]   Daily Weight  06/25/24 : 74.8 kg (165 lb)    Body mass index is 25.84 kg/m².    Physical Exam  Constitutional:       Appearance: Normal appearance.   HENT:      Head: Normocephalic and atraumatic.      Mouth/Throat:      Mouth: Mucous membranes are moist.      Pharynx: Oropharynx is clear.   Eyes:      Pupils: Pupils are equal, round, and reactive to light.   Cardiovascular:      Rate and Rhythm: Normal rate and regular rhythm.      Heart sounds: Normal heart sounds.   Pulmonary:      Effort: Pulmonary effort is normal.      Breath sounds: Normal breath sounds.   Abdominal:      General: Abdomen is flat. Bowel sounds are normal.      Palpations: Abdomen is soft.      Tenderness: There is abdominal tenderness.      Comments: Ostomy, dry dressing   Musculoskeletal:      Cervical back: Normal range of motion.   Neurological:      Mental Status: He is alert.           Antibiotics  sodium chloride 0.9 % bolus 1,000 mL  ketorolac (Toradol) injection 15 mg  ondansetron (Zofran) injection 4 mg  ketorolac (Toradol) injection  - Omnicell Override Pull  iohexol (OMNIPaque) 350 mg iodine/mL solution 75 mL  morphine injection 4 mg  ciprofloxacin (Cipro) IVPB 400 mg  metroNIDAZOLE (Flagyl) 500 mg in NaCl (iso-os) 100 mL  HYDROmorphone (Dilaudid) injection 1 mg  polyethylene glycol (Glycolax, Miralax) packet 17 g  cefepime (Maxipime) IV 2 g  metroNIDAZOLE (Flagyl) 500 mg in NaCl (iso-os) 100 mL  sodium chloride 0.9% infusion  morphine injection 2 mg  morphine injection 4 mg  ketorolac (Toradol) injection 15 mg  ondansetron (Zofran) tablet 4  mg  ondansetron (Zofran) injection 4 mg  lactated Ringer's infusion  aspirin EC tablet 81 mg  ezetimibe (Zetia) tablet 10 mg  metoprolol succinate XL (Toprol-XL) 24 hr tablet 25 mg  nitroglycerin (Nitrostat) SL tablet 0.4 mg  rosuvastatin (Crestor) tablet 20 mg  heparin (porcine) injection 5,000 Units  bisacodyl (Dulcolax) suppository 10 mg  midazolam (Versed) injection  - Omnicell Override Pull  fentaNYL PF (Sublimaze) injection  - Omnicell Override Pull  fentaNYL PF (Sublimaze) injection  midazolam (Versed) injection  iohexol (OMNIPaque) 350 mg iodine/mL solution 35 mL  morphine injection 2 mg  lidocaine (cardiac) (Xylocaine) injection  - Omnicell Override Pull  dexAMETHasone (Decadron) injection  - Omnicell Override Pull  ondansetron (Zofran) injection  - Omnicell Override Pull  rocuronium (ZeMuron) injection  - Omnicell Override Pull  sugammadex (Bridion) injection  - Omnicell Override Pull  succinylcholine (Anectine) injection  - Omnicell Override Pull  sevoflurane inhaler solution  - Omnicell Override Pull  fentaNYL PF (Sublimaze) injection  - Omnicell Override Pull  midazolam (Versed) injection  - Omnicell Override Pull  propofol (Diprivan) injection  - Omnicell Override Pull  HYDROmorphone (Dilaudid) injection  - Omnicell Override Pull  sodium chloride (PF) 0.9% solution  - Omnicell Override Pull  bupivacaine PF (Marcaine) injection  - Omnicell Override Pull  oxygen (O2) therapy  lactated Ringer's infusion  oxyCODONE (Roxicodone) immediate release tablet 5 mg  HYDROmorphone (Dilaudid) injection 0.25 mg  HYDROmorphone (Dilaudid) injection 0.5 mg  ondansetron (Zofran) injection 4 mg  droperidol (Inapsine) injection 0.625 mg  rocuronium (ZeMuron) injection  - Omnicell Override Pull  glycopyrrolate (Robinul) injection  - Omnicell Override Pull  bupivacaine PF (Marcaine) 0.5 % (5 mg/mL) injection  HYDROmorphone (Dilaudid) injection 0.2 mg  HYDROmorphone (Dilaudid) injection 0.2 mg  D5 % and 0.9 % sodium chloride  infusion  morphine injection 2 mg  morphine injection 4 mg  enoxaparin (Lovenox) syringe 40 mg  HYDROmorphone (Dilaudid) injection 0.2 mg  sodium chloride 0.9 % bolus 500 mL  pantoprazole (ProtoNix) injection 40 mg  oxyCODONE (Roxicodone) immediate release tablet 5 mg  oxyCODONE (Roxicodone) immediate release tablet 10 mg      Relevant Results  Labs  Results from last 72 hours   Lab Units 06/29/24  0700 06/28/24  0749 06/27/24  0648   WBC AUTO x10*3/uL 10.4 13.4* 11.7*   HEMOGLOBIN g/dL 14.3 13.6 13.1*   HEMATOCRIT % 43.2 39.8* 38.7*   PLATELETS AUTO x10*3/uL 422 441 405   NEUTROS PCT AUTO % 65.0 75.7 87.6   LYMPHS PCT AUTO % 20.4 15.2 8.1   MONOS PCT AUTO % 9.5 7.2 3.1   EOS PCT AUTO % 3.3 0.6 0.0     Results from last 72 hours   Lab Units 06/29/24  0700 06/28/24  0749 06/27/24  0648   SODIUM mmol/L 133* 132* 133*   POTASSIUM mmol/L 4.3 4.2 4.6   CHLORIDE mmol/L 100 99 100   CO2 mmol/L 26 27 25   BUN mg/dL 9 14 10   CREATININE mg/dL 0.75 0.76 0.76   GLUCOSE mg/dL 98 121* 136*   CALCIUM mg/dL 8.7 8.6 8.6   ANION GAP mmol/L 11 10 13   EGFR mL/min/1.73m*2 >90 >90 >90     Results from last 72 hours   Lab Units 06/29/24  0700 06/28/24  0749 06/27/24  0648   ALK PHOS U/L 40 44 47   BILIRUBIN TOTAL mg/dL 0.4 0.4 0.4   PROTEIN TOTAL g/dL 6.3* 6.3* 6.3*   ALT U/L 16 19 17   AST U/L 11 17 16   ALBUMIN g/dL 3.1* 3.1* 3.2*     Estimated Creatinine Clearance: 93 mL/min (by C-G formula based on SCr of 0.75 mg/dL).  C-Reactive Protein   Date Value Ref Range Status   06/23/2024 12.31 (H) <1.00 mg/dL Final     Microbiology  Susceptibility data from last 14 days.  Collected Specimen Info Organism   06/25/24 Fluid from Aspirate Mixed Anaerobic Bacteria     Mixed Gram-Positive Bacteria    Imaging  reviewed        Assessment/Plan     Sigmoid diverticulitis / abscess, sp ct drainage, free communication to colon cavity, sp surgery,the culture with mixed re    Recommendations :  Continue Cefepime and Flagyl  Discussed with the medical  team     I spent minutes in the professional and overall care of this patient.      Andressa Chery MD

## 2024-06-29 NOTE — PROGRESS NOTES
"Jatinder Keenan is a 63 y.o. male on day 6 of admission presenting with Diverticulitis of colon with perforation.    Subjective   Pt is feeling better today.       Objective     Physical Exam  Vitals and nursing note reviewed.   Constitutional:       General: He is not in acute distress.     Appearance: Normal appearance. He is ill-appearing. He is not toxic-appearing.   HENT:      Head: Normocephalic and atraumatic.      Nose: Nose normal.      Mouth/Throat:      Mouth: Mucous membranes are moist.   Eyes:      Extraocular Movements: Extraocular movements intact.      Conjunctiva/sclera: Conjunctivae normal.      Pupils: Pupils are equal, round, and reactive to light.   Cardiovascular:      Rate and Rhythm: Normal rate and regular rhythm.      Heart sounds: No murmur heard.     No gallop.   Pulmonary:      Effort: Pulmonary effort is normal. No respiratory distress.      Breath sounds: Normal breath sounds. No wheezing, rhonchi or rales.   Abdominal:      General: Abdomen is flat. There is no distension.      Palpations: Abdomen is soft. There is no mass.      Tenderness: There is abdominal tenderness.      Comments: Surgical site: dressing: some drainage appreciated, +ostomy   Musculoskeletal:         General: No swelling or tenderness. Normal range of motion.      Cervical back: Normal range of motion and neck supple.   Skin:     General: Skin is warm and dry.   Neurological:      General: No focal deficit present.      Mental Status: He is alert and oriented to person, place, and time. Mental status is at baseline.   Psychiatric:         Mood and Affect: Mood normal.         Behavior: Behavior normal.         Thought Content: Thought content normal.         Judgment: Judgment normal.         Last Recorded Vitals:  /79 (BP Location: Right arm, Patient Position: Lying)   Pulse 61   Temp 36.4 °C (97.5 °F) (Temporal)   Resp 15   Ht 1.702 m (5' 7\")   Wt 74.8 kg (165 lb)   SpO2 92%   BMI 25.84 kg/m²  "     Scheduled medications:  [Held by provider] aspirin, 81 mg, oral, Daily  cefepime, 2 g, intravenous, q8h  enoxaparin, 40 mg, subcutaneous, Daily  ezetimibe, 10 mg, oral, Daily  metoprolol succinate XL, 25 mg, oral, Daily  metroNIDAZOLE, 500 mg, intravenous, q8h  pantoprazole, 40 mg, intravenous, Daily  polyethylene glycol, 17 g, oral, Daily  rosuvastatin, 20 mg, oral, Daily      Continuous medications:  D5 % and 0.9 % sodium chloride, 100 mL/hr, Last Rate: 100 mL/hr (06/29/24 1047)        PRN medications:  PRN medications: nitroglycerin, ondansetron **OR** ondansetron, oxyCODONE **OR** oxyCODONE     Relevant Results:  Results for orders placed or performed during the hospital encounter of 06/23/24 (from the past 24 hour(s))   ECG 12 lead   Result Value Ref Range    Ventricular Rate 62 BPM    Atrial Rate 62 BPM    OK Interval 90 ms    QRS Duration 130 ms    QT Interval 458 ms    QTC Calculation(Bazett) 464 ms    P Axis -12 degrees    R Axis -21 degrees    T Axis 34 degrees    QRS Count 10 beats    Q Onset 218 ms    P Onset 173 ms    P Offset 196 ms    T Offset 447 ms    QTC Fredericia 463 ms   POCT GLUCOSE   Result Value Ref Range    POCT Glucose 127 (H) 74 - 99 mg/dL   Comprehensive Metabolic Panel   Result Value Ref Range    Glucose 98 74 - 99 mg/dL    Sodium 133 (L) 136 - 145 mmol/L    Potassium 4.3 3.5 - 5.3 mmol/L    Chloride 100 98 - 107 mmol/L    Bicarbonate 26 21 - 32 mmol/L    Anion Gap 11 10 - 20 mmol/L    Urea Nitrogen 9 6 - 23 mg/dL    Creatinine 0.75 0.50 - 1.30 mg/dL    eGFR >90 >60 mL/min/1.73m*2    Calcium 8.7 8.6 - 10.3 mg/dL    Albumin 3.1 (L) 3.4 - 5.0 g/dL    Alkaline Phosphatase 40 33 - 136 U/L    Total Protein 6.3 (L) 6.4 - 8.2 g/dL    AST 11 9 - 39 U/L    Bilirubin, Total 0.4 0.0 - 1.2 mg/dL    ALT 16 10 - 52 U/L   CBC and Auto Differential   Result Value Ref Range    WBC 10.4 4.4 - 11.3 x10*3/uL    nRBC 0.0 0.0 - 0.0 /100 WBCs    RBC 4.38 (L) 4.50 - 5.90 x10*6/uL    Hemoglobin 14.3 13.5 -  17.5 g/dL    Hematocrit 43.2 41.0 - 52.0 %    MCV 99 80 - 100 fL    MCH 32.6 26.0 - 34.0 pg    MCHC 33.1 32.0 - 36.0 g/dL    RDW 11.1 (L) 11.5 - 14.5 %    Platelets 422 150 - 450 x10*3/uL    Neutrophils % 65.0 40.0 - 80.0 %    Immature Granulocytes %, Automated 1.2 (H) 0.0 - 0.9 %    Lymphocytes % 20.4 13.0 - 44.0 %    Monocytes % 9.5 2.0 - 10.0 %    Eosinophils % 3.3 0.0 - 6.0 %    Basophils % 0.6 0.0 - 2.0 %    Neutrophils Absolute 6.75 1.20 - 7.70 x10*3/uL    Immature Granulocytes Absolute, Automated 0.12 0.00 - 0.70 x10*3/uL    Lymphocytes Absolute 2.11 1.20 - 4.80 x10*3/uL    Monocytes Absolute 0.98 0.10 - 1.00 x10*3/uL    Eosinophils Absolute 0.34 0.00 - 0.70 x10*3/uL    Basophils Absolute 0.06 0.00 - 0.10 x10*3/uL   POCT GLUCOSE   Result Value Ref Range    POCT Glucose 98 74 - 99 mg/dL       XR chest 1 view    Result Date: 6/28/2024  Interpreted By:  Roni Gill, STUDY: XR CHEST 1 VIEW;  6/28/2024 2:06 pm   INDICATION: Signs/Symptoms:epigastric pain.   COMPARISON: 03/26/2017 chest radiograph. Abdominal radiographs dated 06/27/2024   ACCESSION NUMBER(S): [id number]   ORDERING CLINICIAN: PILO JORDAN   FINDINGS: AP semi-upright radiograph of the chest was provided.     CARDIOMEDIASTINAL SILHOUETTE: Cardiomediastinal silhouette is stable in size and configuration. Marked tortuosity of the thoracic aorta is noted.   LUNGS: Bibasal atelectasis is noted. There is no segmental consolidation. There is no evidence of pneumothorax.   ABDOMEN: There is visible postoperative pneumoperitoneum. Since yesterday's study, the enteric tube has been removed.   BONES: No acute osseous changes.       1.  Bibasal subsegmental atelectasis. Residual postoperative pneumoperitoneum.       MACRO: None   Signed by: Roni Gill 6/28/2024 4:29 PM Dictation workstation:   [id number]    ECG 12 lead    Result Date: 6/28/2024  Sinus rhythm with short KY Right bundle branch block Abnormal ECG When compared with ECG of 28-JUN-2024  14:21, (unconfirmed) No significant change was found    XR abdomen 1 view    Result Date: 6/27/2024  Interpreted By:  Roni Gill, STUDY: XR ABDOMEN 1 VIEW;  6/27/2024 4:07 pm   INDICATION: Signs/Symptoms:NG tube placement.   COMPARISON: None.   ACCESSION NUMBER(S): [id number]   ORDERING CLINICIAN: RAMSES FELIZ   FINDINGS: NG tube is in place with the tip in the proximal body of the stomach along the greater curvature. There is a nonobstructive bowel gas pattern. Skin staples are noted over the lower abdominal region above the symphysis in the midline. Ostomy device is visible overlying the left lower quadrant.   Visualized lungs show minimal basal subsegmental atelectasis.   Osseous structures demonstrate no acute bony changes.       1.  NG tube present in the stomach as described above. Postoperative changes noted as well.   MACRO: None   Signed by: Roni Gill 6/27/2024 4:42 PM Dictation workstation:   [id number]           Assessment/Plan   Principal Problem:    Diverticulitis of colon with perforation  Active Problems:    Diverticulitis of large intestine with abscess without bleeding    Jatinder Keenan is a 63 y.o. male Ex smoker with a history of CAD s/p MI and PCI to RCA x 2 (2016), HTN, HLD, remote PE, and CVA (not on Eliquis)  who presented to the hospital for constipation and abdominal pain and was found to have acute diverticulitis with perforation     Acute diverticulitis with perforation/sigmoid abscess sp I and D by IR 6/25, sp sigmoid resection, colostomy  - surg following  - cefepime and Flagyl  - ID following  - IVF  - morphine PRN for pain  - dilaudid PRN for breakthrough pain    Normocytic anemia  resolved    Leukocytosis  - monitor    CAD  - crestor  - zetia  - hold ASA    HTN  BP elevated   - resume metoprolol     DVT prophylaxis: SCD, lovenox  Dispo: monitor clinically       Sushila Ferguson MD  Hospitalist

## 2024-06-29 NOTE — PROGRESS NOTES
General Surgery/Trauma Inpatient Progress Note    Interval History:  Jatinder Keenan is a 63 y.o. male who is   S/P sigmoid resection and colostomy 6/26    Overnight Event:   None     Complaints:   Did ok with clears       Past Medical History:  Past Medical History:   Diagnosis Date    Acute maxillary sinusitis, unspecified 04/03/2015    Acute maxillary sinusitis    Body mass index (BMI) 37.0-37.9, adult 07/17/2019    BMI 37.0-37.9, adult    Chest pain on breathing 09/10/2015    Chest pain on respiration    Contact dermatitis due to poison ivy 06/22/2024    COVID-19 virus infection 03/16/2023    Laceration of left elbow 03/16/2023    Lumbago with sciatica, left side 07/19/2017    Acute left-sided low back pain with left-sided sciatica    Muscle pain 03/16/2023    Other specified abnormal findings of blood chemistry     Abnormal liver function test    Personal history of diseases of the skin and subcutaneous tissue 01/30/2014    History of folliculitis    Personal history of other diseases of the digestive system 03/29/2017    History of lower gastrointestinal bleeding    Personal history of other diseases of the digestive system 02/12/2018    History of gastrointestinal hemorrhage    Personal history of other specified conditions 01/30/2014    History of headache    Personal history of other specified conditions 11/15/2018    History of epigastric pain    Personal history of pulmonary embolism 06/20/2022    History of pulmonary embolism    Polymyalgia rheumatica (Multi) 11/15/2018    Polymyalgia    Weakness 05/07/2018    Left-sided weakness        Past Surgical History:  Past Surgical History:   Procedure Laterality Date    COLONOSCOPY  03/29/2017    Complete Colonoscopy    CORONARY ANGIOPLASTY WITH STENT PLACEMENT  02/06/2018    Cath Stent Placement    ESOPHAGOGASTRODUODENOSCOPY  03/29/2017    Diagnostic Esophagogastroduodenoscopy    HERNIA REPAIR  06/03/2013    Hernia Repair    LUNG SURGERY  09/28/2013     Lung Surgery    MR HEAD ANGIO WO IV CONTRAST  4/30/2018    MR HEAD ANGIO WO IV CONTRAST 4/30/2018 [facility]    MR NECK ANGIO WO IV CONTRAST  4/30/2018    MR NECK ANGIO WO IV CONTRAST 4/30/2018 [facility]    OTHER SURGICAL HISTORY  06/03/2013    Spinal Diskectomy Lumbar        Medications:  Current Outpatient Medications   Medication Instructions    aspirin 81 mg, oral, Daily    ezetimibe (Zetia) 10 mg tablet 1 tablet, oral, Daily    metoprolol succinate XL (Toprol-XL) 25 mg 24 hr tablet 1 tablet, oral, Daily    nitroglycerin (NITROSTAT) 0.4 mg, sublingual, Every 5 min PRN    rosuvastatin (Crestor) 20 mg tablet 1 tablet, oral, Daily        Allergies:  Allergies   Allergen Reactions    Atorvastatin Unknown        Family History:  Family History   Problem Relation Name Age of Onset    Diabetes Mother      Heart failure Father          Social History:  Social History     Socioeconomic History    Marital status:      Spouse name: Not on file    Number of children: Not on file    Years of education: Not on file    Highest education level: Not on file   Occupational History    Not on file   Tobacco Use    Smoking status: Some Days     Current packs/day: 0.25     Average packs/day: 0.3 packs/day for 15.0 years (3.8 ttl pk-yrs)     Types: Cigarettes    Smokeless tobacco: Never   Substance and Sexual Activity    Alcohol use: Not on file    Drug use: Not on file    Sexual activity: Not on file   Other Topics Concern    Not on file   Social History Narrative    Not on file     Social Determinants of Health     Financial Resource Strain: Low Risk  (6/28/2024)    Overall Financial Resource Strain (CARDIA)     Difficulty of Paying Living Expenses: Not hard at all   Food Insecurity: Not on file   Transportation Needs: No Transportation Needs (6/28/2024)    PRAPARE - Transportation     Lack of Transportation (Medical): No     Lack of Transportation (Non-Medical): No   Physical Activity: Not on file    Stress: Not on file   Social Connections: Not on file   Intimate Partner Violence: Not on file   Housing Stability: Low Risk  (6/28/2024)    Housing Stability Vital Sign     Unable to Pay for Housing in the Last Year: No     Number of Places Lived in the Last Year: 1     Unstable Housing in the Last Year: No        Review of Systems:  A complete 12 point review of systems was performed. It is otherwise negative except as noted in the above interval history.     Vital Signs:  Vitals:    06/29/24 0545   BP: 145/82   Pulse: 54   Resp: 15   Temp: 36.3 °C (97.3 °F)   SpO2: 92%      Body mass index is 25.84 kg/m².      Physical Exam:  General: No acute distress.   Neuro: Alert and oriented ×3. Follows commands.  Face: Atraumatic, no visible skin lesion.   Head: Atraumatic  Eyes: Pupils equal reactive to light. Extraocular motions intact.  Ears: Hears normal speaking voice.  Mouth, Nose, Throat: Mucous membranes moist.  Normal dentition.  Neck: Supple. No visible masses.  Breast: Not examined.   Lung: Patent airway and no labored breath.   Vascular: Palpable radial pulses bilaterally.  Abdomen: soft, incision clean. Ostomy viable. Minimal output.   Rectal and Perianal: Not examined.   Genitourinary: Not examined.   Musculoskeletal: Moves all extremities.  Normal range of motion.  Extremities: No cyanosis. No edema.   Lymphatic: No palpable lymph nodes.  Skin: No rashes or lesions.  Psychological: Normal affect      Laboratory Values:  CBC:   Lab Results   Component Value Date    WBC 10.4 06/29/2024    RBC 4.38 (L) 06/29/2024    HGB 14.3 06/29/2024    HCT 43.2 06/29/2024     06/29/2024       RFP:   Lab Results   Component Value Date     (L) 06/29/2024    K 4.3 06/29/2024     06/29/2024    CO2 26 06/29/2024    BUN 9 06/29/2024    CREATININE 0.75 06/29/2024    CALCIUM 8.7 06/29/2024        LFTs:   Lab Results   Component Value Date    PROT 6.3 (L) 06/29/2024    ALBUMIN 3.1 (L) 06/29/2024    BILITOT 0.4  06/29/2024    ALKPHOS 40 06/29/2024    AST 11 06/29/2024    ALT 16 06/29/2024            Imaging:  I have personally reviewed the images and the radiologist's report.  XR chest 1 view    Result Date: 6/28/2024  Interpreted By:  Roni Gill, STUDY: XR CHEST 1 VIEW;  6/28/2024 2:06 pm   INDICATION: Signs/Symptoms:epigastric pain.   COMPARISON: 03/26/2017 chest radiograph. Abdominal radiographs dated 06/27/2024   ACCESSION NUMBER(S): [id number]   ORDERING CLINICIAN: PILO JORDAN   FINDINGS: AP semi-upright radiograph of the chest was provided.     CARDIOMEDIASTINAL SILHOUETTE: Cardiomediastinal silhouette is stable in size and configuration. Marked tortuosity of the thoracic aorta is noted.   LUNGS: Bibasal atelectasis is noted. There is no segmental consolidation. There is no evidence of pneumothorax.   ABDOMEN: There is visible postoperative pneumoperitoneum. Since yesterday's study, the enteric tube has been removed.   BONES: No acute osseous changes.       1.  Bibasal subsegmental atelectasis. Residual postoperative pneumoperitoneum.       MACRO: None   Signed by: Roni Gill 6/28/2024 4:29 PM Dictation workstation:   [id number]    ECG 12 lead    Result Date: 6/28/2024  Sinus rhythm with short ME Right bundle branch block Abnormal ECG When compared with ECG of 28-JUN-2024 14:21, (unconfirmed) No significant change was found    XR abdomen 1 view    Result Date: 6/27/2024  Interpreted By:  Roni Gill, STUDY: XR ABDOMEN 1 VIEW;  6/27/2024 4:07 pm   INDICATION: Signs/Symptoms:NG tube placement.   COMPARISON: None.   ACCESSION NUMBER(S): [id number]   ORDERING CLINICIAN: RAMSES FELIZ   FINDINGS: NG tube is in place with the tip in the proximal body of the stomach along the greater curvature. There is a nonobstructive bowel gas pattern. Skin staples are noted over the lower abdominal region above the symphysis in the midline. Ostomy device is visible overlying the left lower quadrant.   Visualized lungs  show minimal basal subsegmental atelectasis.   Osseous structures demonstrate no acute bony changes.       1.  NG tube present in the stomach as described above. Postoperative changes noted as well.   MACRO: None   Signed by: Roni Gill 6/27/2024 4:42 PM Dictation workstation:   [id number]    CT abdomen pelvis wo IV contrast    Result Date: 6/26/2024  Interpreted By:  Jc Elaine, STUDY: CT ABDOMEN PELVIS WO IV CONTRAST;  6/26/2024 5:19 pm   INDICATION: Signs/Symptoms:increase in abd pain.   COMPARISON: Selected CT abdomen and pelvis examinations as far back as March 26, 2017 including June 23, 2024 CT examination. Images from 05/20/2024 CT-guided pelvic aspiration procedure.   ACCESSION NUMBER(S): [id number]   ORDERING CLINICIAN: PILO JORDAN   TECHNIQUE: CT of the abdomen and pelvis was performed. Contiguous axial images were obtained at 3 mm slice thickness through the abdomen and pelvis. Coronal and sagittal reconstructions at 3 mm slice thickness were performed.  No intravenous or oral contrast agents were administered.   FINDINGS: Please note that the evaluation of vessels, lymph nodes and organs is limited without intravenous contrast.   LOWER CHEST: Increase bronchial impaction right lower lobe. Bibasilar discoid atelectasis increased on the right.   ABDOMEN:   LIVER: Similar appearance with mild scattered homogeneous hypodensities most compatible with cysts although some are too small to characterize.   BILE DUCTS: Normal caliber.   GALLBLADDER: Nondistended containing radiopaque material likely vicarious excretion of contrast.   PANCREAS: Within normal limits.   SPLEEN: Within normal limits.   ADRENAL GLANDS: Within normal limits.   KIDNEYS AND URETERS: The kidneys are normal in size and unremarkable in appearance.  No hydroureteronephrosis or nephroureterolithiasis is identified.   PELVIS:   BLADDER: Unremarkable   REPRODUCTIVE ORGANS: Prostate minimally indents the base urinary bladder.    BOWEL: Persistent segmental wall thickening involving the sigmoid colon with indistinctness of the surrounding fat compatible with inflammation. There is persistent lack of normal visualization of a portion of the superior wall at site of prior abscess drainage series 203, image 67 with potential contained perforation without remaining drainable abscess. There is similar thickening of the adjacent peritoneum. There is similar prominence of the adjacent lymph nodes including lymph node anterior to the iliac bifurcation measuring 12 mm transverse diameter series 21, image 91. Findings remain compatible with complicated diverticulitis with potential adjacent peritonitis. Underlying mass not reliably excluded. The proximal bowel is nondistended.   VESSELS: Scattered arterial vascular calcifications.   PERITONEUM/RETROPERITONEUM: Minimal persistent free fluid dependent portion of the pelvis inferior to the sigmoid colon coronal series 202, image 73.   ABDOMINAL WALL: Similar indirect fat containing inguinal hernias.   BONES: No acute osseous abnormalities.       1.  Findings compatible with complex sigmoid diverticulitis with persistent segmental wall thickening, and wall abnormality with suggestion of small contained perforation without residual abscess with similar adjacent mesenteric inflammation and peritonitis. 2. Similar adjacent prominent lymph nodes. 3. Underlying mass not reliably excluded. 4. Increase mucous impaction right lower lobe with increase adjacent subsegmental atelectasis.   Jc Elaine discussed the significance and urgency of this critical finding by secure chat with  PILO JORDAN on 6/26/2024 at 7:08 pm.  (**-RCF-**) Findings:  See findings.   Signed by: Jc Elaine 6/26/2024 7:08 PM Dictation workstation:   [id number]    CT guided aspiration of abscess, hematoma, cyst    Result Date: 6/25/2024  Interpreted By:  Vaughn Mccarthy, STUDY: CT GUIDED ASPIRATION OF ABSCESS, HEMATOMA, CYST;   6/25/2024 10:55 am   INDICATION: Signs/Symptoms:Intra-abdominal abscess drain.   COMPARISON: CT abdomen from 06/23/2024   ACCESSION NUMBER(S): [id number]   ORDERING CLINICIAN: PORTER ROSE   TECHNIQUE:   CONSENT: The patient/patient's POA/next of kin was informed of the nature of the proposed procedure. The purposes, alternatives, risks, and benefits were explained and discussed. All questions were answered and consent was obtained.   RADIATION EXPOSURE:   SEDATION: Moderate conscious IV sedation services (supervision of administration, induction, and maintenance) were provided by the physician performing the procedure with intravenous fentanyl 100 mcg and versed 1 mg for 20 minutes. The physician was assisted by an independent trained observer, an interventional radiology nurse, in the continuous monitoring of patient level of consciousness and physiologic status.   MEDICATION/CONTRAST: No additional   TIME OUT: A time out was performed immediately prior to procedure start with the interventional team, correctly identifying the patient name, date of birth, MRN, procedure, anatomy (including marking of site and side), patient position, procedure consent form, relevant laboratory and imaging test results, antibiotic administration, safety precautions, and procedure-specific equipment needs.   COMPLICATIONS: No immediate adverse events identified.   FINDINGS: Limited axial CT images were obtained through the abdomen at 5 mm slice thickness. The images demonstrated  air pocket adjacent to the sigmoid colon with minimal amount of fluid but no visible air-fluid levels. There has been mild interval improvement in pericolonic fat stranding and sigmoid wall edema/thickening when compared to previous exam. No free air is seen to suggest perforation.. The patient was placed on supine positioning and prepped in the usual sterile manner. 1% lidocaine was used for local anesthesia at the planned skin insertion site.   Under  direct CT guidance, a 5 [place] Yueh needle/catheter was placed into the collection via  midline anterior abdominal wall approach. After confirmation of position of the needle within the collection, the inner needle/stylette was withdrawn. The content was aspirated and demonstrates purulent fluid.  Subsequently, 10 mL of Omnipaque 350 contrast material was injected into the pocket. The contrast material freely drains into the sigmoid colon. Given these findings the Yueh catheter was removed and no drain was placed in the collection.   Postprocedure images demonstrated no evidence of hemorrhage.   Patient tolerated the procedure without immediate complication. Fluid was sent to the laboratory for further analysis.       Successful CT-guided aspiration of fluid collection in the perisigmoid region as above.   Signed by: Vaughn Mccarthy 6/25/2024 11:22 AM Dictation workstation:   [id number]    CT abdomen pelvis w IV contrast    Result Date: 6/23/2024  Interpreted By:  Flavio Miranda, STUDY: CT ABDOMEN PELVIS W IV CONTRAST;  6/23/2024 6:28 pm   INDICATION: Signs/Symptoms:Constipation for 2 weeks.  Pain to bilateral lower quadrants.   COMPARISON: None.   ACCESSION NUMBER(S): [id number]   ORDERING CLINICIAN: AYAAN SHEETS   TECHNIQUE: Contiguous axial images of the abdomen and pelvis were obtained after the intravenous administration of  contrast. Coronal and sagittal reformatted images were obtained from the axial images.   FINDINGS: There is limited evaluation of the lung bases. Mild basilar atelectasis. Coronary artery atherosclerotic calcifications.   2.8 cm cyst in the left hepatic lobe and several subcentimeter hepatic hypodensities which are too small to characterize. The gallbladder is present. No dilatation common bile duct.   The pancreas, spleen, and adrenal glands appear unremarkable.   Symmetric enhancement of the kidneys. No hydronephrosis.   No evidence of bowel obstruction or acute appendicitis. Fluid-filled  segments of colon. There is colonic diverticulosis with prominent wall thickening and edema of the surrounding the sigmoid colon with findings compatible with colonic perforation and there is a 3.7 x 2.8 cm abscess involving the wall of the sigmoid colon. There prominent lymph nodes superior to sigmoid colon which measure up to 1.6 cm.   Urinary bladder is underdistended and not well evaluated.   Multilevel degenerative change of the lumbar spine.       Colonic diverticulosis with prominent wall thickening and edema of the sigmoid colon compatible with acute diverticulitis and evidence of component of perforation with prominent surrounding edema and a 3.7 cm x 2.8 cm abscess in the wall of the sigmoid colon. Prominent and enlarged lymph nodes superior to the sigmoid colon measure up to 1.6 cm.   Follow-up colonoscopy is recommended after treatment to exclude other underlying pathology including mass/malignancy.   MACRO: None   Signed by: Flavio Miranda 6/23/2024 7:35 PM Dictation workstation:   [id number]        Assessment:  This is a 63 y.o. male who is     POD3, s/p josé miguel procedure for perforated diverticulitis   Adequate progress     Plan:  Supportive care   Keep on clears until better bowel function   Other care per medicine   Will continue to follow     Arlin Spencer MD [facility]  General Surgery  Office: [phone]  Fax:     [phone]  8:54 AM   06/29/24

## 2024-06-29 NOTE — CARE PLAN
The patient's goals for the shift include      The clinical goals for the shift include patient will have a decrease in pain throughout shift

## 2024-06-30 VITALS
OXYGEN SATURATION: 92 % | HEIGHT: 67 IN | BODY MASS INDEX: 25.9 KG/M2 | TEMPERATURE: 98.1 F | SYSTOLIC BLOOD PRESSURE: 132 MMHG | HEART RATE: 53 BPM | DIASTOLIC BLOOD PRESSURE: 81 MMHG | WEIGHT: 165 LBS | RESPIRATION RATE: 16 BRPM

## 2024-06-30 LAB
ALBUMIN SERPL BCP-MCNC: 3 G/DL (ref 3.4–5)
ALP SERPL-CCNC: 36 U/L (ref 33–136)
ALT SERPL W P-5'-P-CCNC: 13 U/L (ref 10–52)
ANION GAP SERPL CALC-SCNC: 10 MMOL/L (ref 10–20)
AST SERPL W P-5'-P-CCNC: 10 U/L (ref 9–39)
BASOPHILS # BLD AUTO: 0.08 X10*3/UL (ref 0–0.1)
BASOPHILS NFR BLD AUTO: 0.7 %
BILIRUB SERPL-MCNC: 0.5 MG/DL (ref 0–1.2)
BUN SERPL-MCNC: 10 MG/DL (ref 6–23)
CALCIUM SERPL-MCNC: 8.6 MG/DL (ref 8.6–10.3)
CHLORIDE SERPL-SCNC: 100 MMOL/L (ref 98–107)
CO2 SERPL-SCNC: 25 MMOL/L (ref 21–32)
CREAT SERPL-MCNC: 0.69 MG/DL (ref 0.5–1.3)
EGFRCR SERPLBLD CKD-EPI 2021: >90 ML/MIN/1.73M*2
EOSINOPHIL # BLD AUTO: 0.41 X10*3/UL (ref 0–0.7)
EOSINOPHIL NFR BLD AUTO: 3.8 %
ERYTHROCYTE [DISTWIDTH] IN BLOOD BY AUTOMATED COUNT: 11.2 % (ref 11.5–14.5)
GLUCOSE SERPL-MCNC: 103 MG/DL (ref 74–99)
HCT VFR BLD AUTO: 40 % (ref 41–52)
HGB BLD-MCNC: 13.6 G/DL (ref 13.5–17.5)
IMM GRANULOCYTES # BLD AUTO: 0.15 X10*3/UL (ref 0–0.7)
IMM GRANULOCYTES NFR BLD AUTO: 1.4 % (ref 0–0.9)
LYMPHOCYTES # BLD AUTO: 1.97 X10*3/UL (ref 1.2–4.8)
LYMPHOCYTES NFR BLD AUTO: 18.3 %
MCH RBC QN AUTO: 32.9 PG (ref 26–34)
MCHC RBC AUTO-ENTMCNC: 34 G/DL (ref 32–36)
MCV RBC AUTO: 97 FL (ref 80–100)
MONOCYTES # BLD AUTO: 0.94 X10*3/UL (ref 0.1–1)
MONOCYTES NFR BLD AUTO: 8.7 %
NEUTROPHILS # BLD AUTO: 7.21 X10*3/UL (ref 1.2–7.7)
NEUTROPHILS NFR BLD AUTO: 67.1 %
NRBC BLD-RTO: 0 /100 WBCS (ref 0–0)
PLATELET # BLD AUTO: 427 X10*3/UL (ref 150–450)
POTASSIUM SERPL-SCNC: 3.9 MMOL/L (ref 3.5–5.3)
PROT SERPL-MCNC: 6.1 G/DL (ref 6.4–8.2)
RBC # BLD AUTO: 4.14 X10*6/UL (ref 4.5–5.9)
SODIUM SERPL-SCNC: 131 MMOL/L (ref 136–145)
WBC # BLD AUTO: 10.8 X10*3/UL (ref 4.4–11.3)

## 2024-06-30 PROCEDURE — 2500000004 HC RX 250 GENERAL PHARMACY W/ HCPCS (ALT 636 FOR OP/ED): Performed by: SURGERY

## 2024-06-30 PROCEDURE — 2500000002 HC RX 250 W HCPCS SELF ADMINISTERED DRUGS (ALT 637 FOR MEDICARE OP, ALT 636 FOR OP/ED): Performed by: SURGERY

## 2024-06-30 PROCEDURE — C9113 INJ PANTOPRAZOLE SODIUM, VIA: HCPCS | Performed by: SURGERY

## 2024-06-30 PROCEDURE — 2500000001 HC RX 250 WO HCPCS SELF ADMINISTERED DRUGS (ALT 637 FOR MEDICARE OP): Performed by: STUDENT IN AN ORGANIZED HEALTH CARE EDUCATION/TRAINING PROGRAM

## 2024-06-30 PROCEDURE — 99232 SBSQ HOSP IP/OBS MODERATE 35: CPT | Performed by: STUDENT IN AN ORGANIZED HEALTH CARE EDUCATION/TRAINING PROGRAM

## 2024-06-30 PROCEDURE — 99024 POSTOP FOLLOW-UP VISIT: CPT | Performed by: SURGERY

## 2024-06-30 PROCEDURE — 85025 COMPLETE CBC W/AUTO DIFF WBC: CPT | Performed by: SURGERY

## 2024-06-30 PROCEDURE — 1100000001 HC PRIVATE ROOM DAILY

## 2024-06-30 PROCEDURE — 2500000001 HC RX 250 WO HCPCS SELF ADMINISTERED DRUGS (ALT 637 FOR MEDICARE OP): Performed by: SURGERY

## 2024-06-30 PROCEDURE — 2500000004 HC RX 250 GENERAL PHARMACY W/ HCPCS (ALT 636 FOR OP/ED): Mod: JZ | Performed by: SURGERY

## 2024-06-30 PROCEDURE — 36415 COLL VENOUS BLD VENIPUNCTURE: CPT | Performed by: SURGERY

## 2024-06-30 PROCEDURE — 80053 COMPREHEN METABOLIC PANEL: CPT | Performed by: SURGERY

## 2024-06-30 RX ADMIN — OXYCODONE HYDROCHLORIDE 5 MG: 5 TABLET ORAL at 12:25

## 2024-06-30 RX ADMIN — DEXTROSE AND SODIUM CHLORIDE 100 ML/HR: 5; 900 INJECTION, SOLUTION INTRAVENOUS at 01:23

## 2024-06-30 RX ADMIN — METRONIDAZOLE 500 MG: 500 INJECTION, SOLUTION INTRAVENOUS at 11:04

## 2024-06-30 RX ADMIN — OXYCODONE HYDROCHLORIDE 10 MG: 10 TABLET ORAL at 20:45

## 2024-06-30 RX ADMIN — ONDANSETRON 4 MG: 2 INJECTION INTRAMUSCULAR; INTRAVENOUS at 08:31

## 2024-06-30 RX ADMIN — OXYCODONE HYDROCHLORIDE 10 MG: 10 TABLET ORAL at 16:03

## 2024-06-30 RX ADMIN — OXYCODONE HYDROCHLORIDE 10 MG: 10 TABLET ORAL at 01:31

## 2024-06-30 RX ADMIN — ONDANSETRON 4 MG: 2 INJECTION INTRAMUSCULAR; INTRAVENOUS at 16:03

## 2024-06-30 RX ADMIN — ROSUVASTATIN CALCIUM 20 MG: 20 TABLET, FILM COATED ORAL at 08:31

## 2024-06-30 RX ADMIN — METRONIDAZOLE 500 MG: 500 INJECTION, SOLUTION INTRAVENOUS at 01:23

## 2024-06-30 RX ADMIN — CEFEPIME 2 G: 1 INJECTION, SOLUTION INTRAVENOUS at 20:45

## 2024-06-30 RX ADMIN — METOPROLOL SUCCINATE 25 MG: 25 TABLET, EXTENDED RELEASE ORAL at 08:31

## 2024-06-30 RX ADMIN — METRONIDAZOLE 500 MG: 500 INJECTION, SOLUTION INTRAVENOUS at 17:16

## 2024-06-30 RX ADMIN — EZETIMIBE 10 MG: 10 TABLET ORAL at 08:31

## 2024-06-30 RX ADMIN — CEFEPIME 2 G: 1 INJECTION, SOLUTION INTRAVENOUS at 05:24

## 2024-06-30 RX ADMIN — OXYCODONE HYDROCHLORIDE 5 MG: 5 TABLET ORAL at 08:31

## 2024-06-30 RX ADMIN — CEFEPIME 2 G: 1 INJECTION, SOLUTION INTRAVENOUS at 12:20

## 2024-06-30 RX ADMIN — PANTOPRAZOLE SODIUM 40 MG: 40 INJECTION, POWDER, FOR SOLUTION INTRAVENOUS at 08:31

## 2024-06-30 RX ADMIN — ENOXAPARIN SODIUM 40 MG: 40 INJECTION SUBCUTANEOUS at 08:31

## 2024-06-30 ASSESSMENT — COGNITIVE AND FUNCTIONAL STATUS - GENERAL
DRESSING REGULAR LOWER BODY CLOTHING: A LITTLE
WALKING IN HOSPITAL ROOM: A LITTLE
MOBILITY SCORE: 21
MOBILITY SCORE: 21
DAILY ACTIVITIY SCORE: 23
WALKING IN HOSPITAL ROOM: A LITTLE
STANDING UP FROM CHAIR USING ARMS: A LITTLE
DAILY ACTIVITIY SCORE: 23
STANDING UP FROM CHAIR USING ARMS: A LITTLE
CLIMB 3 TO 5 STEPS WITH RAILING: A LITTLE
CLIMB 3 TO 5 STEPS WITH RAILING: A LITTLE
DRESSING REGULAR LOWER BODY CLOTHING: A LITTLE

## 2024-06-30 ASSESSMENT — PAIN SCALES - GENERAL
PAINLEVEL_OUTOF10: 7
PAINLEVEL_OUTOF10: 4
PAINLEVEL_OUTOF10: 9
PAINLEVEL_OUTOF10: 7
PAINLEVEL_OUTOF10: 3
PAINLEVEL_OUTOF10: 5 - MODERATE PAIN
PAINLEVEL_OUTOF10: 4
PAINLEVEL_OUTOF10: 6
PAINLEVEL_OUTOF10: 3
PAINLEVEL_OUTOF10: 5 - MODERATE PAIN

## 2024-06-30 ASSESSMENT — PAIN DESCRIPTION - LOCATION
LOCATION: ABDOMEN

## 2024-06-30 ASSESSMENT — PAIN - FUNCTIONAL ASSESSMENT
PAIN_FUNCTIONAL_ASSESSMENT: 0-10
PAIN_FUNCTIONAL_ASSESSMENT: 0-10

## 2024-06-30 ASSESSMENT — PAIN DESCRIPTION - ORIENTATION: ORIENTATION: MID

## 2024-06-30 NOTE — CARE PLAN
The patient's goals for the shift include      The clinical goals for the shift include patient will have controlled pain levels throughout shift

## 2024-06-30 NOTE — PROGRESS NOTES
General Surgery/Trauma Inpatient Progress Note    Interval History:  Jatinder Keenan is a 63 y.o. male      Overnight Event:   None     Complaints:   Hungry   Air in colostomy bag     Past Medical History:  Past Medical History:   Diagnosis Date    Acute maxillary sinusitis, unspecified 04/03/2015    Acute maxillary sinusitis    Body mass index (BMI) 37.0-37.9, adult 07/17/2019    BMI 37.0-37.9, adult    Chest pain on breathing 09/10/2015    Chest pain on respiration    Contact dermatitis due to poison ivy 06/22/2024    COVID-19 virus infection 03/16/2023    Laceration of left elbow 03/16/2023    Lumbago with sciatica, left side 07/19/2017    Acute left-sided low back pain with left-sided sciatica    Muscle pain 03/16/2023    Other specified abnormal findings of blood chemistry     Abnormal liver function test    Personal history of diseases of the skin and subcutaneous tissue 01/30/2014    History of folliculitis    Personal history of other diseases of the digestive system 03/29/2017    History of lower gastrointestinal bleeding    Personal history of other diseases of the digestive system 02/12/2018    History of gastrointestinal hemorrhage    Personal history of other specified conditions 01/30/2014    History of headache    Personal history of other specified conditions 11/15/2018    History of epigastric pain    Personal history of pulmonary embolism 06/20/2022    History of pulmonary embolism    Polymyalgia rheumatica (Multi) 11/15/2018    Polymyalgia    Weakness 05/07/2018    Left-sided weakness        Past Surgical History:  Past Surgical History:   Procedure Laterality Date    COLONOSCOPY  03/29/2017    Complete Colonoscopy    CORONARY ANGIOPLASTY WITH STENT PLACEMENT  02/06/2018    Cath Stent Placement    ESOPHAGOGASTRODUODENOSCOPY  03/29/2017    Diagnostic Esophagogastroduodenoscopy    HERNIA REPAIR  06/03/2013    Hernia Repair    LUNG SURGERY  09/28/2013    Lung Surgery    MR HEAD ANGIO WO IV  CONTRAST  4/30/2018    MR HEAD ANGIO WO IV CONTRAST 4/30/2018 [facility]    MR NECK ANGIO WO IV CONTRAST  4/30/2018    MR NECK ANGIO WO IV CONTRAST 4/30/2018 [facility]    OTHER SURGICAL HISTORY  06/03/2013    Spinal Diskectomy Lumbar        Medications:  Current Outpatient Medications   Medication Instructions    aspirin 81 mg, oral, Daily    ezetimibe (Zetia) 10 mg tablet 1 tablet, oral, Daily    metoprolol succinate XL (Toprol-XL) 25 mg 24 hr tablet 1 tablet, oral, Daily    nitroglycerin (NITROSTAT) 0.4 mg, sublingual, Every 5 min PRN    rosuvastatin (Crestor) 20 mg tablet 1 tablet, oral, Daily        Allergies:  Allergies   Allergen Reactions    Atorvastatin Unknown        Family History:  Family History   Problem Relation Name Age of Onset    Diabetes Mother      Heart failure Father          Social History:  Social History     Socioeconomic History    Marital status:      Spouse name: Not on file    Number of children: Not on file    Years of education: Not on file    Highest education level: Not on file   Occupational History    Not on file   Tobacco Use    Smoking status: Some Days     Current packs/day: 0.25     Average packs/day: 0.3 packs/day for 15.0 years (3.8 ttl pk-yrs)     Types: Cigarettes    Smokeless tobacco: Never   Substance and Sexual Activity    Alcohol use: Not on file    Drug use: Not on file    Sexual activity: Not on file   Other Topics Concern    Not on file   Social History Narrative    Not on file     Social Determinants of Health     Financial Resource Strain: Low Risk  (6/28/2024)    Overall Financial Resource Strain (CARDIA)     Difficulty of Paying Living Expenses: Not hard at all   Food Insecurity: Not on file   Transportation Needs: No Transportation Needs (6/28/2024)    PRAPARE - Transportation     Lack of Transportation (Medical): No     Lack of Transportation (Non-Medical): No   Physical Activity: Not on file   Stress: Not on file   Social  Connections: Not on file   Intimate Partner Violence: Not on file   Housing Stability: Low Risk  (6/28/2024)    Housing Stability Vital Sign     Unable to Pay for Housing in the Last Year: No     Number of Places Lived in the Last Year: 1     Unstable Housing in the Last Year: No        Review of Systems:  A complete 12 point review of systems was performed. It is otherwise negative except as noted in the above interval history.     Vital Signs:  Vitals:    06/30/24 0548   BP: 132/80   Pulse: 61   Resp: 18   Temp: 36.5 °C (97.7 °F)   SpO2: 93%      Body mass index is 25.84 kg/m².      Physical Exam:  General: No acute distress.   Neuro: Alert and oriented ×3. Follows commands.  Face: Atraumatic, no visible skin lesion.   Head: Atraumatic  Eyes: Pupils equal reactive to light. Extraocular motions intact.  Ears: Hears normal speaking voice.  Mouth, Nose, Throat: Mucous membranes moist.  Normal dentition.  Neck: Supple. No visible masses.  Breast: Not examined.   Chest: No appreciable scars or masses.   Heart/Pulse: Regular  Lung: Patent airway and no labored breath.   Vascular: Palpable radial pulses bilaterally.  Abdomen: soft, NT, ND. Colostomy viable. Air in bag.   Rectal and Perianal: Not examined.   Genitourinary: Not examined.   Musculoskeletal: Moves all extremities.  Normal range of motion.  Extremities: No cyanosis. No edema.   Lymphatic: No palpable lymph nodes.  Skin: No rashes or lesions.  Psychological: Normal affect      Laboratory Values:  CBC:   Lab Results   Component Value Date    WBC 10.8 06/30/2024    RBC 4.14 (L) 06/30/2024    HGB 13.6 06/30/2024    HCT 40.0 (L) 06/30/2024     06/30/2024       RFP:   Lab Results   Component Value Date     (L) 06/30/2024    K 3.9 06/30/2024     06/30/2024    CO2 25 06/30/2024    BUN 10 06/30/2024    CREATININE 0.69 06/30/2024    CALCIUM 8.6 06/30/2024        LFTs:   Lab Results   Component Value Date    PROT 6.1 (L) 06/30/2024    ALBUMIN 3.0 (L)  06/30/2024    BILITOT 0.5 06/30/2024    ALKPHOS 36 06/30/2024    AST 10 06/30/2024    ALT 13 06/30/2024            Imaging:  I have personally reviewed the images and the radiologist's report.  XR chest 1 view    Result Date: 6/28/2024  Interpreted By:  Roni Gill, STUDY: XR CHEST 1 VIEW;  6/28/2024 2:06 pm   INDICATION: Signs/Symptoms:epigastric pain.   COMPARISON: 03/26/2017 chest radiograph. Abdominal radiographs dated 06/27/2024   ACCESSION NUMBER(S): [id number]   ORDERING CLINICIAN: PILO JORDAN   FINDINGS: AP semi-upright radiograph of the chest was provided.     CARDIOMEDIASTINAL SILHOUETTE: Cardiomediastinal silhouette is stable in size and configuration. Marked tortuosity of the thoracic aorta is noted.   LUNGS: Bibasal atelectasis is noted. There is no segmental consolidation. There is no evidence of pneumothorax.   ABDOMEN: There is visible postoperative pneumoperitoneum. Since yesterday's study, the enteric tube has been removed.   BONES: No acute osseous changes.       1.  Bibasal subsegmental atelectasis. Residual postoperative pneumoperitoneum.       MACRO: None   Signed by: Roni Gill 6/28/2024 4:29 PM Dictation workstation:   [id number]    ECG 12 lead    Result Date: 6/28/2024  Sinus rhythm with short [place] Right bundle branch block Abnormal ECG When compared with ECG of 28-JUN-2024 14:21, (unconfirmed) No significant change was found    XR abdomen 1 view    Result Date: 6/27/2024  Interpreted By:  Roni Gill, STUDY: XR ABDOMEN 1 VIEW;  6/27/2024 4:07 pm   INDICATION: Signs/Symptoms:NG tube placement.   COMPARISON: None.   ACCESSION NUMBER(S): [id number]   ORDERING CLINICIAN: RAMSES FELIZ   FINDINGS: NG tube is in place with the tip in the proximal body of the stomach along the greater curvature. There is a nonobstructive bowel gas pattern. Skin staples are noted over the lower abdominal region above the symphysis in the midline. Ostomy device is visible overlying the left lower  quadrant.   Visualized lungs show minimal basal subsegmental atelectasis.   Osseous structures demonstrate no acute bony changes.       1.  NG tube present in the stomach as described above. Postoperative changes noted as well.   MACRO: None   Signed by: Roni Gill 6/27/2024 4:42 PM Dictation workstation:   [id number]    CT abdomen pelvis wo IV contrast    Result Date: 6/26/2024  Interpreted By:  Jc Elaine, STUDY: CT ABDOMEN PELVIS WO IV CONTRAST;  6/26/2024 5:19 pm   INDICATION: Signs/Symptoms:increase in abd pain.   COMPARISON: Selected CT abdomen and pelvis examinations as far back as March 26, 2017 including June 23, 2024 CT examination. Images from 05/20/2024 CT-guided pelvic aspiration procedure.   ACCESSION NUMBER(S): [id number]   ORDERING CLINICIAN: PILO JORDAN   TECHNIQUE: CT of the abdomen and pelvis was performed. Contiguous axial images were obtained at 3 mm slice thickness through the abdomen and pelvis. Coronal and sagittal reconstructions at 3 mm slice thickness were performed.  No intravenous or oral contrast agents were administered.   FINDINGS: Please note that the evaluation of vessels, lymph nodes and organs is limited without intravenous contrast.   LOWER CHEST: Increase bronchial impaction right lower lobe. Bibasilar discoid atelectasis increased on the right.   ABDOMEN:   LIVER: Similar appearance with mild scattered homogeneous hypodensities most compatible with cysts although some are too small to characterize.   BILE DUCTS: Normal caliber.   GALLBLADDER: Nondistended containing radiopaque material likely vicarious excretion of contrast.   PANCREAS: Within normal limits.   SPLEEN: Within normal limits.   ADRENAL GLANDS: Within normal limits.   KIDNEYS AND URETERS: The kidneys are normal in size and unremarkable in appearance.  No hydroureteronephrosis or nephroureterolithiasis is identified.   PELVIS:   BLADDER: Unremarkable   REPRODUCTIVE ORGANS: Prostate minimally indents  the base urinary bladder.   BOWEL: Persistent segmental wall thickening involving the sigmoid colon with indistinctness of the surrounding fat compatible with inflammation. There is persistent lack of normal visualization of a portion of the superior wall at site of prior abscess drainage series 203, image 67 with potential contained perforation without remaining drainable abscess. There is similar thickening of the adjacent peritoneum. There is similar prominence of the adjacent lymph nodes including lymph node anterior to the iliac bifurcation measuring 12 mm transverse diameter series 21, image 91. Findings remain compatible with complicated diverticulitis with potential adjacent peritonitis. Underlying mass not reliably excluded. The proximal bowel is nondistended.   VESSELS: Scattered arterial vascular calcifications.   PERITONEUM/RETROPERITONEUM: Minimal persistent free fluid dependent portion of the pelvis inferior to the sigmoid colon coronal series 202, image 73.   ABDOMINAL WALL: Similar indirect fat containing inguinal hernias.   BONES: No acute osseous abnormalities.       1.  Findings compatible with complex sigmoid diverticulitis with persistent segmental wall thickening, and wall abnormality with suggestion of small contained perforation without residual abscess with similar adjacent mesenteric inflammation and peritonitis. 2. Similar adjacent prominent lymph nodes. 3. Underlying mass not reliably excluded. 4. Increase mucous impaction right lower lobe with increase adjacent subsegmental atelectasis.   Jc Elaine discussed the significance and urgency of this critical finding by secure chat with  PILO JORDAN on 6/26/2024 at 7:08 pm.  (**-RCF-**) Findings:  See findings.   Signed by: Jc Elaine 6/26/2024 7:08 PM Dictation workstation:   [id number]    CT guided aspiration of abscess, hematoma, cyst    Result Date: 6/25/2024  Interpreted By:  Vaughn Mccarthy, STUDY: CT GUIDED ASPIRATION OF  ABSCESS, HEMATOMA, CYST;  6/25/2024 10:55 am   INDICATION: Signs/Symptoms:Intra-abdominal abscess drain.   COMPARISON: CT abdomen from 06/23/2024   ACCESSION NUMBER(S): [id number]   ORDERING CLINICIAN: PORTER ROSE   TECHNIQUE:   CONSENT: The patient/patient's POA/next of kin was informed of the nature of the proposed procedure. The purposes, alternatives, risks, and benefits were explained and discussed. All questions were answered and consent was obtained.   RADIATION EXPOSURE:   SEDATION: Moderate conscious IV sedation services (supervision of administration, induction, and maintenance) were provided by the physician performing the procedure with intravenous fentanyl 100 mcg and versed 1 mg for 20 minutes. The physician was assisted by an independent trained observer, an interventional radiology nurse, in the continuous monitoring of patient level of consciousness and physiologic status.   MEDICATION/CONTRAST: No additional   TIME OUT: A time out was performed immediately prior to procedure start with the interventional team, correctly identifying the patient name, date of birth, MRN, procedure, anatomy (including marking of site and side), patient position, procedure consent form, relevant laboratory and imaging test results, antibiotic administration, safety precautions, and procedure-specific equipment needs.   COMPLICATIONS: No immediate adverse events identified.   FINDINGS: Limited axial CT images were obtained through the abdomen at 5 mm slice thickness. The images demonstrated  air pocket adjacent to the sigmoid colon with minimal amount of fluid but no visible air-fluid levels. There has been mild interval improvement in pericolonic fat stranding and sigmoid wall edema/thickening when compared to previous exam. No free air is seen to suggest perforation.. The patient was placed on supine positioning and prepped in the usual sterile manner. 1% lidocaine was used for local anesthesia at the planned  skin insertion site.   Under direct CT guidance, a 5 Georgian Yueh needle/catheter was placed into the collection via  midline anterior abdominal wall approach. After confirmation of position of the needle within the collection, the inner needle/stylette was withdrawn. The content was aspirated and demonstrates purulent fluid.  Subsequently, 10 mL of Omnipaque 350 contrast material was injected into the pocket. The contrast material freely drains into the sigmoid colon. Given these findings the Yueh catheter was removed and no drain was placed in the collection.   Postprocedure images demonstrated no evidence of hemorrhage.   Patient tolerated the procedure without immediate complication. Fluid was sent to the laboratory for further analysis.       Successful CT-guided aspiration of fluid collection in the perisigmoid region as above.   Signed by: Vaughn Mccarthy 6/25/2024 11:22 AM Dictation workstation:   [id number]    CT abdomen pelvis w IV contrast    Result Date: 6/23/2024  Interpreted By:  Flavio Miranda, STUDY: CT ABDOMEN PELVIS W IV CONTRAST;  6/23/2024 6:28 pm   INDICATION: Signs/Symptoms:Constipation for 2 weeks.  Pain to bilateral lower quadrants.   COMPARISON: None.   ACCESSION NUMBER(S): [id number]   ORDERING CLINICIAN: AYAAN SHEETS   TECHNIQUE: Contiguous axial images of the abdomen and pelvis were obtained after the intravenous administration of  contrast. Coronal and sagittal reformatted images were obtained from the axial images.   FINDINGS: There is limited evaluation of the lung bases. Mild basilar atelectasis. Coronary artery atherosclerotic calcifications.   2.8 cm cyst in the left hepatic lobe and several subcentimeter hepatic hypodensities which are too small to characterize. The gallbladder is present. No dilatation common bile duct.   The pancreas, spleen, and adrenal glands appear unremarkable.   Symmetric enhancement of the kidneys. No hydronephrosis.   No evidence of bowel obstruction or  acute appendicitis. Fluid-filled segments of colon. There is colonic diverticulosis with prominent wall thickening and edema of the surrounding the sigmoid colon with findings compatible with colonic perforation and there is a 3.7 x 2.8 cm abscess involving the wall of the sigmoid colon. There prominent lymph nodes superior to sigmoid colon which measure up to 1.6 cm.   Urinary bladder is underdistended and not well evaluated.   Multilevel degenerative change of the lumbar spine.       Colonic diverticulosis with prominent wall thickening and edema of the sigmoid colon compatible with acute diverticulitis and evidence of component of perforation with prominent surrounding edema and a 3.7 cm x 2.8 cm abscess in the wall of the sigmoid colon. Prominent and enlarged lymph nodes superior to the sigmoid colon measure up to 1.6 cm.   Follow-up colonoscopy is recommended after treatment to exclude other underlying pathology including mass/malignancy.   MACRO: None   Signed by: Flavio Miranda 6/23/2024 7:35 PM Dictation workstation:   [id number]        Assessment:  This is a 63 y.o. male who is     POD4, s/p josé miguel procedure for perforated diverticulitis   Adequate progress     Plan:  Supportive care   Full liquids. Ok to advance as tolerated   Other care per medicine   Should be able for discharge tomorrow if doing ok with diet       Arlin Spencer MD [facility]  General Surgery  Office: [phone]  Fax:     [phone]  10:09 AM   06/30/24

## 2024-06-30 NOTE — PROGRESS NOTES
Jatinder Keenan is a 63 y.o. male on day 7 of admission presenting with Diverticulitis of colon with perforation.    Subjective   Interval History: no fever, less pain, no emesis        Review of Systems    Objective   Range of Vitals (last 24 hours)  Heart Rate:  [55-73]   Temp:  [36.4 °C (97.5 °F)-36.8 °C (98.2 °F)]   Resp:  [17-22]   BP: (126-135)/(78-86)   SpO2:  [92 %-93 %]   Daily Weight  06/25/24 : 74.8 kg (165 lb)    Body mass index is 25.84 kg/m².    Physical Exam  Constitutional:       Appearance: Normal appearance.   HENT:      Head: Normocephalic and atraumatic.      Mouth/Throat:      Mouth: Mucous membranes are moist.      Pharynx: Oropharynx is clear.   Eyes:      Pupils: Pupils are equal, round, and reactive to light.   Cardiovascular:      Rate and Rhythm: Normal rate and regular rhythm.      Heart sounds: Normal heart sounds.   Pulmonary:      Effort: Pulmonary effort is normal.      Breath sounds: Normal breath sounds.   Abdominal:      General: Abdomen is flat. Bowel sounds are normal.      Palpations: Abdomen is soft.      Tenderness: There is abdominal tenderness.      Comments: Ostomy, dry dressing   Musculoskeletal:      Cervical back: Normal range of motion.   Neurological:      Mental Status: He is alert.           Antibiotics  sodium chloride 0.9 % bolus 1,000 mL  ketorolac (Toradol) injection 15 mg  ondansetron (Zofran) injection 4 mg  ketorolac (Toradol) injection  - Omnicell Override Pull  iohexol (OMNIPaque) 350 mg iodine/mL solution 75 mL  morphine injection 4 mg  ciprofloxacin (Cipro) IVPB 400 mg  metroNIDAZOLE (Flagyl) 500 mg in NaCl (iso-os) 100 mL  HYDROmorphone (Dilaudid) injection 1 mg  polyethylene glycol (Glycolax, Miralax) packet 17 g  cefepime (Maxipime) IV 2 g  metroNIDAZOLE (Flagyl) 500 mg in NaCl (iso-os) 100 mL  sodium chloride 0.9% infusion  morphine injection 2 mg  morphine injection 4 mg  ketorolac (Toradol) injection 15 mg  ondansetron (Zofran) tablet 4  mg  ondansetron (Zofran) injection 4 mg  lactated Ringer's infusion  aspirin EC tablet 81 mg  ezetimibe (Zetia) tablet 10 mg  metoprolol succinate XL (Toprol-XL) 24 hr tablet 25 mg  nitroglycerin (Nitrostat) SL tablet 0.4 mg  rosuvastatin (Crestor) tablet 20 mg  heparin (porcine) injection 5,000 Units  bisacodyl (Dulcolax) suppository 10 mg  midazolam (Versed) injection  - Omnicell Override Pull  fentaNYL PF (Sublimaze) injection  - Omnicell Override Pull  fentaNYL PF (Sublimaze) injection  midazolam (Versed) injection  iohexol (OMNIPaque) 350 mg iodine/mL solution 35 mL  morphine injection 2 mg  lidocaine (cardiac) (Xylocaine) injection  - Omnicell Override Pull  dexAMETHasone (Decadron) injection  - Omnicell Override Pull  ondansetron (Zofran) injection  - Omnicell Override Pull  rocuronium (ZeMuron) injection  - Omnicell Override Pull  sugammadex (Bridion) injection  - Omnicell Override Pull  succinylcholine (Anectine) injection  - Omnicell Override Pull  sevoflurane inhaler solution  - Omnicell Override Pull  fentaNYL PF (Sublimaze) injection  - Omnicell Override Pull  midazolam (Versed) injection  - Omnicell Override Pull  propofol (Diprivan) injection  - Omnicell Override Pull  HYDROmorphone (Dilaudid) injection  - Omnicell Override Pull  sodium chloride (PF) 0.9% solution  - Omnicell Override Pull  bupivacaine PF (Marcaine) injection  - Omnicell Override Pull  oxygen (O2) therapy  lactated Ringer's infusion  oxyCODONE (Roxicodone) immediate release tablet 5 mg  HYDROmorphone (Dilaudid) injection 0.25 mg  HYDROmorphone (Dilaudid) injection 0.5 mg  ondansetron (Zofran) injection 4 mg  droperidol (Inapsine) injection 0.625 mg  rocuronium (ZeMuron) injection  - Omnicell Override Pull  glycopyrrolate (Robinul) injection  - Omnicell Override Pull  bupivacaine PF (Marcaine) 0.5 % (5 mg/mL) injection  HYDROmorphone (Dilaudid) injection 0.2 mg  HYDROmorphone (Dilaudid) injection 0.2 mg  D5 % and 0.9 % sodium chloride  infusion  morphine injection 2 mg  morphine injection 4 mg  enoxaparin (Lovenox) syringe 40 mg  HYDROmorphone (Dilaudid) injection 0.2 mg  sodium chloride 0.9 % bolus 500 mL  pantoprazole (ProtoNix) injection 40 mg  oxyCODONE (Roxicodone) immediate release tablet 5 mg  oxyCODONE (Roxicodone) immediate release tablet 10 mg      Relevant Results  Labs  Results from last 72 hours   Lab Units 06/30/24 0633 06/29/24 0700 06/28/24  0749   WBC AUTO x10*3/uL 10.8 10.4 13.4*   HEMOGLOBIN g/dL 13.6 14.3 13.6   HEMATOCRIT % 40.0* 43.2 39.8*   PLATELETS AUTO x10*3/uL 427 422 441   NEUTROS PCT AUTO % 67.1 65.0 75.7   LYMPHS PCT AUTO % 18.3 20.4 15.2   MONOS PCT AUTO % 8.7 9.5 7.2   EOS PCT AUTO % 3.8 3.3 0.6     Results from last 72 hours   Lab Units 06/30/24 0633 06/29/24 0700 06/28/24  0749   SODIUM mmol/L 131* 133* 132*   POTASSIUM mmol/L 3.9 4.3 4.2   CHLORIDE mmol/L 100 100 99   CO2 mmol/L 25 26 27   BUN mg/dL 10 9 14   CREATININE mg/dL 0.69 0.75 0.76   GLUCOSE mg/dL 103* 98 121*   CALCIUM mg/dL 8.6 8.7 8.6   ANION GAP mmol/L 10 11 10   EGFR mL/min/1.73m*2 >90 >90 >90     Results from last 72 hours   Lab Units 06/30/24 0633 06/29/24 0700 06/28/24  0749   ALK PHOS U/L 36 40 44   BILIRUBIN TOTAL mg/dL 0.5 0.4 0.4   PROTEIN TOTAL g/dL 6.1* 6.3* 6.3*   ALT U/L 13 16 19   AST U/L 10 11 17   ALBUMIN g/dL 3.0* 3.1* 3.1*     Estimated Creatinine Clearance: 101.1 mL/min (by C-G formula based on SCr of 0.69 mg/dL).  C-Reactive Protein   Date Value Ref Range Status   06/23/2024 12.31 (H) <1.00 mg/dL Final     Microbiology  Susceptibility data from last 14 days.  Collected Specimen Info Organism   06/25/24 Fluid from Aspirate Mixed Anaerobic Bacteria     Mixed Gram-Positive Bacteria    Imaging  reviewed        Assessment/Plan     Sigmoid diverticulitis / abscess, sp ct drainage, free communication to colon cavity, sp surgery,the culture with mixed re    Recommendations :  Continue Cefepime and Flagyl  Discussed with the  medical team     I spent minutes in the professional and overall care of this patient.      Andressa Chery MD

## 2024-06-30 NOTE — PROGRESS NOTES
"Jatinder Keenan is a 63 y.o. male on day 7 of admission presenting with Diverticulitis of colon with perforation.    Subjective   Pt is not feeling well today. He has some pain and passing a lot of gas.       Objective     Physical Exam  Vitals and nursing note reviewed.   Constitutional:       General: He is not in acute distress.     Appearance: Normal appearance. He is ill-appearing. He is not toxic-appearing.   HENT:      Head: Normocephalic and atraumatic.      Nose: Nose normal.      Mouth/Throat:      Mouth: Mucous membranes are moist.   Eyes:      Extraocular Movements: Extraocular movements intact.      Conjunctiva/sclera: Conjunctivae normal.      Pupils: Pupils are equal, round, and reactive to light.   Cardiovascular:      Rate and Rhythm: Normal rate and regular rhythm.      Heart sounds: No murmur heard.     No gallop.   Pulmonary:      Effort: Pulmonary effort is normal. No respiratory distress.      Breath sounds: Normal breath sounds. No wheezing, rhonchi or rales.   Abdominal:      General: Abdomen is flat. There is no distension.      Palpations: Abdomen is soft. There is no mass.      Tenderness: There is abdominal tenderness.      Comments: Surgical site: dressing: some drainage appreciated, +ostomy   Musculoskeletal:         General: No swelling or tenderness. Normal range of motion.      Cervical back: Normal range of motion and neck supple.   Skin:     General: Skin is warm and dry.   Neurological:      General: No focal deficit present.      Mental Status: He is alert and oriented to person, place, and time. Mental status is at baseline.   Psychiatric:         Mood and Affect: Mood normal.         Behavior: Behavior normal.         Thought Content: Thought content normal.         Judgment: Judgment normal.         Last Recorded Vitals:  /80 (BP Location: Left arm, Patient Position: Lying)   Pulse 61   Temp 36.5 °C (97.7 °F) (Temporal)   Resp 18   Ht 1.702 m (5' 7\")   Wt 74.8 " kg (165 lb)   SpO2 93%   BMI 25.84 kg/m²      Scheduled medications:  [Held by provider] aspirin, 81 mg, oral, Daily  cefepime, 2 g, intravenous, q8h  enoxaparin, 40 mg, subcutaneous, Daily  ezetimibe, 10 mg, oral, Daily  metoprolol succinate XL, 25 mg, oral, Daily  metroNIDAZOLE, 500 mg, intravenous, q8h  pantoprazole, 40 mg, intravenous, Daily  polyethylene glycol, 17 g, oral, Daily  rosuvastatin, 20 mg, oral, Daily      Continuous medications:  D5 % and 0.9 % sodium chloride, 100 mL/hr, Last Rate: 100 mL/hr (06/30/24 0520)        PRN medications:  PRN medications: nitroglycerin, ondansetron **OR** ondansetron, oxyCODONE **OR** oxyCODONE     Relevant Results:  Results for orders placed or performed during the hospital encounter of 06/23/24 (from the past 24 hour(s))   Comprehensive Metabolic Panel   Result Value Ref Range    Glucose 103 (H) 74 - 99 mg/dL    Sodium 131 (L) 136 - 145 mmol/L    Potassium 3.9 3.5 - 5.3 mmol/L    Chloride 100 98 - 107 mmol/L    Bicarbonate 25 21 - 32 mmol/L    Anion Gap 10 10 - 20 mmol/L    Urea Nitrogen 10 6 - 23 mg/dL    Creatinine 0.69 0.50 - 1.30 mg/dL    eGFR >90 >60 mL/min/1.73m*2    Calcium 8.6 8.6 - 10.3 mg/dL    Albumin 3.0 (L) 3.4 - 5.0 g/dL    Alkaline Phosphatase 36 33 - 136 U/L    Total Protein 6.1 (L) 6.4 - 8.2 g/dL    AST 10 9 - 39 U/L    Bilirubin, Total 0.5 0.0 - 1.2 mg/dL    ALT 13 10 - 52 U/L   CBC and Auto Differential   Result Value Ref Range    WBC 10.8 4.4 - 11.3 x10*3/uL    nRBC 0.0 0.0 - 0.0 /100 WBCs    RBC 4.14 (L) 4.50 - 5.90 x10*6/uL    Hemoglobin 13.6 13.5 - 17.5 g/dL    Hematocrit 40.0 (L) 41.0 - 52.0 %    MCV 97 80 - 100 fL    MCH 32.9 26.0 - 34.0 pg    MCHC 34.0 32.0 - 36.0 g/dL    RDW 11.2 (L) 11.5 - 14.5 %    Platelets 427 150 - 450 x10*3/uL    Neutrophils % 67.1 40.0 - 80.0 %    Immature Granulocytes %, Automated 1.4 (H) 0.0 - 0.9 %    Lymphocytes % 18.3 13.0 - 44.0 %    Monocytes % 8.7 2.0 - 10.0 %    Eosinophils % 3.8 0.0 - 6.0 %    Basophils %  0.7 0.0 - 2.0 %    Neutrophils Absolute 7.21 1.20 - 7.70 x10*3/uL    Immature Granulocytes Absolute, Automated 0.15 0.00 - 0.70 x10*3/uL    Lymphocytes Absolute 1.97 1.20 - 4.80 x10*3/uL    Monocytes Absolute 0.94 0.10 - 1.00 x10*3/uL    Eosinophils Absolute 0.41 0.00 - 0.70 x10*3/uL    Basophils Absolute 0.08 0.00 - 0.10 x10*3/uL       XR chest 1 view    Result Date: 6/28/2024  Interpreted By:  Roni Gill, STUDY: XR CHEST 1 VIEW;  6/28/2024 2:06 pm   INDICATION: Signs/Symptoms:epigastric pain.   COMPARISON: 03/26/2017 chest radiograph. Abdominal radiographs dated 06/27/2024   ACCESSION NUMBER(S): [id number]   ORDERING CLINICIAN: PILO JORDAN   FINDINGS: AP semi-upright radiograph of the chest was provided.     CARDIOMEDIASTINAL SILHOUETTE: Cardiomediastinal silhouette is stable in size and configuration. Marked tortuosity of the thoracic aorta is noted.   LUNGS: Bibasal atelectasis is noted. There is no segmental consolidation. There is no evidence of pneumothorax.   ABDOMEN: There is visible postoperative pneumoperitoneum. Since yesterday's study, the enteric tube has been removed.   BONES: No acute osseous changes.       1.  Bibasal subsegmental atelectasis. Residual postoperative pneumoperitoneum.       MACRO: None   Signed by: Roni Gill 6/28/2024 4:29 PM Dictation workstation:   [id number]    ECG 12 lead    Result Date: 6/28/2024  Sinus rhythm with short DE Right bundle branch block Abnormal ECG When compared with ECG of 28-JUN-2024 14:21, (unconfirmed) No significant change was found           Assessment/Plan   Principal Problem:    Diverticulitis of colon with perforation  Active Problems:    Diverticulitis of large intestine with abscess without bleeding    Jatinder Keenan is a 63 y.o. male Ex smoker with a history of CAD s/p MI and PCI to RCA x 2 (2016), HTN, HLD, remote PE, and CVA (not on Eliquis)  who presented to the hospital for constipation and abdominal pain and was found to have  acute diverticulitis with perforation     Acute diverticulitis with perforation/sigmoid abscess sp I and D by IR 6/25, sp sigmoid resection, colostomy  - surg following  - cefepime and Flagyl  - ID following  - IVF  - change to oxycodone PRN    Normocytic anemia  resolved    Leukocytosis  - monitor    CAD  - crestor  - zetia  - hold ASA    HTN  BP elevated   - resume metoprolol     DVT prophylaxis: SCD, lovenox  Dispo: monitor clinically       Sushila Ferguson MD  Hospitalist

## 2024-07-01 ENCOUNTER — PHARMACY VISIT (OUTPATIENT)
Dept: PHARMACY | Facility: CLINIC | Age: 64
End: 2024-07-01
Payer: COMMERCIAL

## 2024-07-01 VITALS
OXYGEN SATURATION: 96 % | HEIGHT: 67 IN | HEART RATE: 87 BPM | SYSTOLIC BLOOD PRESSURE: 131 MMHG | BODY MASS INDEX: 25.9 KG/M2 | WEIGHT: 165 LBS | RESPIRATION RATE: 18 BRPM | DIASTOLIC BLOOD PRESSURE: 78 MMHG | TEMPERATURE: 98.1 F

## 2024-07-01 PROCEDURE — 2500000004 HC RX 250 GENERAL PHARMACY W/ HCPCS (ALT 636 FOR OP/ED): Performed by: SURGERY

## 2024-07-01 PROCEDURE — 2500000001 HC RX 250 WO HCPCS SELF ADMINISTERED DRUGS (ALT 637 FOR MEDICARE OP): Performed by: SURGERY

## 2024-07-01 PROCEDURE — 2500000002 HC RX 250 W HCPCS SELF ADMINISTERED DRUGS (ALT 637 FOR MEDICARE OP, ALT 636 FOR OP/ED): Performed by: SURGERY

## 2024-07-01 PROCEDURE — RXMED WILLOW AMBULATORY MEDICATION CHARGE

## 2024-07-01 PROCEDURE — C9113 INJ PANTOPRAZOLE SODIUM, VIA: HCPCS | Performed by: SURGERY

## 2024-07-01 PROCEDURE — 99024 POSTOP FOLLOW-UP VISIT: CPT | Performed by: SURGERY

## 2024-07-01 PROCEDURE — 2500000001 HC RX 250 WO HCPCS SELF ADMINISTERED DRUGS (ALT 637 FOR MEDICARE OP): Performed by: STUDENT IN AN ORGANIZED HEALTH CARE EDUCATION/TRAINING PROGRAM

## 2024-07-01 PROCEDURE — 99239 HOSP IP/OBS DSCHRG MGMT >30: CPT

## 2024-07-01 RX ORDER — POLYETHYLENE GLYCOL 3350 17 G/17G
17 POWDER, FOR SOLUTION ORAL DAILY
Qty: 238 G | Refills: 0 | Status: SHIPPED | OUTPATIENT
Start: 2024-07-02

## 2024-07-01 RX ORDER — ONDANSETRON 4 MG/1
4 TABLET, ORALLY DISINTEGRATING ORAL EVERY 8 HOURS PRN
Qty: 9 TABLET | Refills: 0 | Status: SHIPPED | OUTPATIENT
Start: 2024-07-01 | End: 2024-07-04

## 2024-07-01 RX ORDER — OXYCODONE HYDROCHLORIDE 5 MG/1
5 TABLET ORAL EVERY 8 HOURS PRN
Qty: 9 TABLET | Refills: 0 | Status: SHIPPED | OUTPATIENT
Start: 2024-07-01 | End: 2024-07-05 | Stop reason: SDUPTHER

## 2024-07-01 RX ORDER — AMOXICILLIN AND CLAVULANATE POTASSIUM 875; 125 MG/1; MG/1
875 TABLET, FILM COATED ORAL 2 TIMES DAILY
Qty: 14 TABLET | Refills: 0 | Status: SHIPPED | OUTPATIENT
Start: 2024-07-01 | End: 2024-07-08

## 2024-07-01 RX ORDER — PANTOPRAZOLE SODIUM 40 MG/1
40 TABLET, DELAYED RELEASE ORAL
Qty: 30 TABLET | Refills: 0 | Status: SHIPPED | OUTPATIENT
Start: 2024-07-01 | End: 2024-07-31

## 2024-07-01 RX ADMIN — DEXTROSE AND SODIUM CHLORIDE 50 ML/HR: 5; 900 INJECTION, SOLUTION INTRAVENOUS at 12:25

## 2024-07-01 RX ADMIN — OXYCODONE HYDROCHLORIDE 10 MG: 10 TABLET ORAL at 08:37

## 2024-07-01 RX ADMIN — PANTOPRAZOLE SODIUM 40 MG: 40 INJECTION, POWDER, FOR SOLUTION INTRAVENOUS at 08:34

## 2024-07-01 RX ADMIN — ROSUVASTATIN CALCIUM 20 MG: 20 TABLET, FILM COATED ORAL at 08:34

## 2024-07-01 RX ADMIN — ASPIRIN 81 MG: 81 TABLET, COATED ORAL at 08:34

## 2024-07-01 RX ADMIN — CEFEPIME 2 G: 1 INJECTION, SOLUTION INTRAVENOUS at 13:40

## 2024-07-01 RX ADMIN — OXYCODONE HYDROCHLORIDE 10 MG: 10 TABLET ORAL at 13:39

## 2024-07-01 RX ADMIN — CEFEPIME 2 G: 1 INJECTION, SOLUTION INTRAVENOUS at 05:27

## 2024-07-01 RX ADMIN — METOPROLOL SUCCINATE 25 MG: 25 TABLET, EXTENDED RELEASE ORAL at 08:34

## 2024-07-01 RX ADMIN — ENOXAPARIN SODIUM 40 MG: 40 INJECTION SUBCUTANEOUS at 08:33

## 2024-07-01 RX ADMIN — METRONIDAZOLE 500 MG: 500 INJECTION, SOLUTION INTRAVENOUS at 01:13

## 2024-07-01 RX ADMIN — EZETIMIBE 10 MG: 10 TABLET ORAL at 08:34

## 2024-07-01 RX ADMIN — OXYCODONE HYDROCHLORIDE 10 MG: 10 TABLET ORAL at 02:24

## 2024-07-01 RX ADMIN — METRONIDAZOLE 500 MG: 500 INJECTION, SOLUTION INTRAVENOUS at 12:11

## 2024-07-01 ASSESSMENT — COGNITIVE AND FUNCTIONAL STATUS - GENERAL
DAILY ACTIVITIY SCORE: 23
MOBILITY SCORE: 21
STANDING UP FROM CHAIR USING ARMS: A LITTLE
CLIMB 3 TO 5 STEPS WITH RAILING: A LITTLE
DRESSING REGULAR LOWER BODY CLOTHING: A LITTLE
WALKING IN HOSPITAL ROOM: A LITTLE

## 2024-07-01 ASSESSMENT — PAIN DESCRIPTION - ORIENTATION
ORIENTATION: LOWER;MID
ORIENTATION: LOWER;MID
ORIENTATION: MID

## 2024-07-01 ASSESSMENT — PAIN SCALES - GENERAL
PAINLEVEL_OUTOF10: 7
PAINLEVEL_OUTOF10: 3
PAINLEVEL_OUTOF10: 2
PAINLEVEL_OUTOF10: 7
PAINLEVEL_OUTOF10: 2
PAINLEVEL_OUTOF10: 8

## 2024-07-01 ASSESSMENT — PAIN DESCRIPTION - LOCATION
LOCATION: ABDOMEN

## 2024-07-01 ASSESSMENT — PAIN - FUNCTIONAL ASSESSMENT
PAIN_FUNCTIONAL_ASSESSMENT: 0-10

## 2024-07-01 NOTE — PROGRESS NOTES
Jatinder Keenan is a 63 y.o. male on day 8 of admission presenting with Diverticulitis of colon with perforation.    Subjective   Interval History: no fever, less pain, no emesis        Review of Systems    Objective   Range of Vitals (last 24 hours)  Heart Rate:  [53-66]   Temp:  [36.5 °C (97.7 °F)-36.9 °C (98.4 °F)]   Resp:  [16-21]   BP: (115-132)/(73-81)   SpO2:  [92 %-94 %]   Daily Weight  06/25/24 : 74.8 kg (165 lb)    Body mass index is 25.84 kg/m².    Physical Exam  Constitutional:       Appearance: Normal appearance.   HENT:      Head: Normocephalic and atraumatic.      Mouth/Throat:      Mouth: Mucous membranes are moist.      Pharynx: Oropharynx is clear.   Eyes:      Pupils: Pupils are equal, round, and reactive to light.   Cardiovascular:      Rate and Rhythm: Normal rate and regular rhythm.      Heart sounds: Normal heart sounds.   Pulmonary:      Effort: Pulmonary effort is normal.      Breath sounds: Normal breath sounds.   Abdominal:      General: Abdomen is flat. Bowel sounds are normal.      Palpations: Abdomen is soft.      Tenderness: There is abdominal tenderness.      Comments: Ostomy, dry dressing   Musculoskeletal:      Cervical back: Normal range of motion.   Neurological:      Mental Status: He is alert.           Antibiotics  sodium chloride 0.9 % bolus 1,000 mL  ketorolac (Toradol) injection 15 mg  ondansetron (Zofran) injection 4 mg  ketorolac (Toradol) injection  - Omnicell Override Pull  iohexol (OMNIPaque) 350 mg iodine/mL solution 75 mL  morphine injection 4 mg  ciprofloxacin (Cipro) IVPB 400 mg  metroNIDAZOLE (Flagyl) 500 mg in NaCl (iso-os) 100 mL  HYDROmorphone (Dilaudid) injection 1 mg  polyethylene glycol (Glycolax, Miralax) packet 17 g  cefepime (Maxipime) IV 2 g  metroNIDAZOLE (Flagyl) 500 mg in NaCl (iso-os) 100 mL  sodium chloride 0.9% infusion  morphine injection 2 mg  morphine injection 4 mg  ketorolac (Toradol) injection 15 mg  ondansetron (Zofran) tablet 4  mg  ondansetron (Zofran) injection 4 mg  lactated Ringer's infusion  aspirin EC tablet 81 mg  ezetimibe (Zetia) tablet 10 mg  metoprolol succinate XL (Toprol-XL) 24 hr tablet 25 mg  nitroglycerin (Nitrostat) SL tablet 0.4 mg  rosuvastatin (Crestor) tablet 20 mg  heparin (porcine) injection 5,000 Units  bisacodyl (Dulcolax) suppository 10 mg  midazolam (Versed) injection  - Omnicell Override Pull  fentaNYL PF (Sublimaze) injection  - Omnicell Override Pull  fentaNYL PF (Sublimaze) injection  midazolam (Versed) injection  iohexol (OMNIPaque) 350 mg iodine/mL solution 35 mL  morphine injection 2 mg  lidocaine (cardiac) (Xylocaine) injection  - Omnicell Override Pull  dexAMETHasone (Decadron) injection  - Omnicell Override Pull  ondansetron (Zofran) injection  - Omnicell Override Pull  rocuronium (ZeMuron) injection  - Omnicell Override Pull  sugammadex (Bridion) injection  - Omnicell Override Pull  succinylcholine (Anectine) injection  - Omnicell Override Pull  sevoflurane inhaler solution  - Omnicell Override Pull  fentaNYL PF (Sublimaze) injection  - Omnicell Override Pull  midazolam (Versed) injection  - Omnicell Override Pull  propofol (Diprivan) injection  - Omnicell Override Pull  HYDROmorphone (Dilaudid) injection  - Omnicell Override Pull  sodium chloride (PF) 0.9% solution  - Omnicell Override Pull  bupivacaine PF (Marcaine) injection  - Omnicell Override Pull  oxygen (O2) therapy  lactated Ringer's infusion  oxyCODONE (Roxicodone) immediate release tablet 5 mg  HYDROmorphone (Dilaudid) injection 0.25 mg  HYDROmorphone (Dilaudid) injection 0.5 mg  ondansetron (Zofran) injection 4 mg  droperidol (Inapsine) injection 0.625 mg  rocuronium (ZeMuron) injection  - Omnicell Override Pull  glycopyrrolate (Robinul) injection  - Omnicell Override Pull  bupivacaine PF (Marcaine) 0.5 % (5 mg/mL) injection  HYDROmorphone (Dilaudid) injection 0.2 mg  HYDROmorphone (Dilaudid) injection 0.2 mg  D5 % and 0.9 % sodium chloride  infusion  morphine injection 2 mg  morphine injection 4 mg  enoxaparin (Lovenox) syringe 40 mg  HYDROmorphone (Dilaudid) injection 0.2 mg  sodium chloride 0.9 % bolus 500 mL  pantoprazole (ProtoNix) injection 40 mg  oxyCODONE (Roxicodone) immediate release tablet 5 mg  oxyCODONE (Roxicodone) immediate release tablet 10 mg      Relevant Results  Labs  Results from last 72 hours   Lab Units 06/30/24  0633 06/29/24  0700   WBC AUTO x10*3/uL 10.8 10.4   HEMOGLOBIN g/dL 13.6 14.3   HEMATOCRIT % 40.0* 43.2   PLATELETS AUTO x10*3/uL 427 422   NEUTROS PCT AUTO % 67.1 65.0   LYMPHS PCT AUTO % 18.3 20.4   MONOS PCT AUTO % 8.7 9.5   EOS PCT AUTO % 3.8 3.3     Results from last 72 hours   Lab Units 06/30/24  0633 06/29/24  0700   SODIUM mmol/L 131* 133*   POTASSIUM mmol/L 3.9 4.3   CHLORIDE mmol/L 100 100   CO2 mmol/L 25 26   BUN mg/dL 10 9   CREATININE mg/dL 0.69 0.75   GLUCOSE mg/dL 103* 98   CALCIUM mg/dL 8.6 8.7   ANION GAP mmol/L 10 11   EGFR mL/min/1.73m*2 >90 >90     Results from last 72 hours   Lab Units 06/30/24  0633 06/29/24  0700   ALK PHOS U/L 36 40   BILIRUBIN TOTAL mg/dL 0.5 0.4   PROTEIN TOTAL g/dL 6.1* 6.3*   ALT U/L 13 16   AST U/L 10 11   ALBUMIN g/dL 3.0* 3.1*     Estimated Creatinine Clearance: 101.1 mL/min (by C-G formula based on SCr of 0.69 mg/dL).  C-Reactive Protein   Date Value Ref Range Status   06/23/2024 12.31 (H) <1.00 mg/dL Final     Microbiology  Susceptibility data from last 14 days.  Collected Specimen Info Organism   06/25/24 Fluid from Aspirate Mixed Anaerobic Bacteria     Mixed Gram-Positive Bacteria    Imaging  reviewed        Assessment/Plan     Sigmoid diverticulitis / abscess, sp ct drainage, free communication to colon cavity, sp surgery,the culture with mixed re    Recommendations :  Continue Cefepime and Flagyl, plan on oral Augmentin x 1 week with discharge  Discussed with the medical team     I spent minutes in the professional and overall care of this patient.      Andressa  MD Miri

## 2024-07-01 NOTE — PROGRESS NOTES
General Surgery/Trauma Inpatient Progress Note    Interval History:  Jatinder Keenan is a 63 y.o. male     Overnight Event:   None     Complaints:   None     Past Medical History:  Past Medical History:   Diagnosis Date    Acute maxillary sinusitis, unspecified 04/03/2015    Acute maxillary sinusitis    Body mass index (BMI) 37.0-37.9, adult 07/17/2019    BMI 37.0-37.9, adult    Chest pain on breathing 09/10/2015    Chest pain on respiration    Contact dermatitis due to poison ivy 06/22/2024    COVID-19 virus infection 03/16/2023    Laceration of left elbow 03/16/2023    Lumbago with sciatica, left side 07/19/2017    Acute left-sided low back pain with left-sided sciatica    Muscle pain 03/16/2023    Other specified abnormal findings of blood chemistry     Abnormal liver function test    Personal history of diseases of the skin and subcutaneous tissue 01/30/2014    History of folliculitis    Personal history of other diseases of the digestive system 03/29/2017    History of lower gastrointestinal bleeding    Personal history of other diseases of the digestive system 02/12/2018    History of gastrointestinal hemorrhage    Personal history of other specified conditions 01/30/2014    History of headache    Personal history of other specified conditions 11/15/2018    History of epigastric pain    Personal history of pulmonary embolism 06/20/2022    History of pulmonary embolism    Polymyalgia rheumatica (Multi) 11/15/2018    Polymyalgia    Weakness 05/07/2018    Left-sided weakness        Past Surgical History:  Past Surgical History:   Procedure Laterality Date    COLONOSCOPY  03/29/2017    Complete Colonoscopy    CORONARY ANGIOPLASTY WITH STENT PLACEMENT  02/06/2018    Cath Stent Placement    ESOPHAGOGASTRODUODENOSCOPY  03/29/2017    Diagnostic Esophagogastroduodenoscopy    HERNIA REPAIR  06/03/2013    Hernia Repair    LUNG SURGERY  09/28/2013    Lung Surgery    MR HEAD ANGIO WO IV CONTRAST  4/30/2018    MR HEAD  ANGIO WO IV CONTRAST 4/30/2018 [facility]    MR NECK ANGIO WO IV CONTRAST  4/30/2018    MR NECK ANGIO WO IV CONTRAST 4/30/2018 [facility]    OTHER SURGICAL HISTORY  06/03/2013    Spinal Diskectomy Lumbar        Medications:  Current Outpatient Medications   Medication Instructions    aspirin 81 mg, oral, Daily    ezetimibe (Zetia) 10 mg tablet 1 tablet, oral, Daily    metoprolol succinate XL (Toprol-XL) 25 mg 24 hr tablet 1 tablet, oral, Daily    nitroglycerin (NITROSTAT) 0.4 mg, sublingual, Every 5 min PRN    rosuvastatin (Crestor) 20 mg tablet 1 tablet, oral, Daily        Allergies:  Allergies   Allergen Reactions    Atorvastatin Unknown        Family History:  Family History   Problem Relation Name Age of Onset    Diabetes Mother      Heart failure Father          Social History:  Social History     Socioeconomic History    Marital status:      Spouse name: Not on file    Number of children: Not on file    Years of education: Not on file    Highest education level: Not on file   Occupational History    Not on file   Tobacco Use    Smoking status: Some Days     Current packs/day: 0.25     Average packs/day: 0.3 packs/day for 15.0 years (3.8 ttl pk-yrs)     Types: Cigarettes    Smokeless tobacco: Never   Substance and Sexual Activity    Alcohol use: Not on file    Drug use: Not on file    Sexual activity: Not on file   Other Topics Concern    Not on file   Social History Narrative    Not on file     Social Determinants of Health     Financial Resource Strain: Low Risk  (6/28/2024)    Overall Financial Resource Strain (CARDIA)     Difficulty of Paying Living Expenses: Not hard at all   Food Insecurity: Not on file   Transportation Needs: No Transportation Needs (6/28/2024)    PRAPARE - Transportation     Lack of Transportation (Medical): No     Lack of Transportation (Non-Medical): No   Physical Activity: Not on file   Stress: Not on file   Social Connections: Not on file   Intimate  Partner Violence: Not on file   Housing Stability: Low Risk  (6/28/2024)    Housing Stability Vital Sign     Unable to Pay for Housing in the Last Year: No     Number of Places Lived in the Last Year: 1     Unstable Housing in the Last Year: No        Review of Systems:  A complete 12 point review of systems was performed. It is otherwise negative except as noted in the above interval history.     Vital Signs:  Vitals:    07/01/24 0547   BP: 115/76   Pulse: 54   Resp: 21   Temp: 36.7 °C (98.1 °F)   SpO2: 92%      Body mass index is 25.84 kg/m².      Physical Exam:  General: No acute distress.   Neuro: Alert and oriented ×3. Follows commands.  Face: Atraumatic, no visible skin lesion.   Head: Atraumatic  Eyes: Pupils equal reactive to light. Extraocular motions intact.  Ears: Hears normal speaking voice.  Mouth, Nose, Throat: Mucous membranes moist.  Normal dentition.  Neck: Supple. No visible masses.  Breast: Not examined.   Chest: No appreciable scars or masses.   Heart/Pulse: Regular  Lung: Patent airway and no labored breath.   Vascular: Palpable radial pulses bilaterally.  Abdomen: soft, NT, ND. Air in colostomy bag with some enteric fluids   Rectal and Perianal: Not examined.   Genitourinary: Not examined.   Musculoskeletal: Moves all extremities.  Normal range of motion.  Extremities: No cyanosis. No edema.   Lymphatic: No palpable lymph nodes.  Skin: No rashes or lesions.  Psychological: Normal affect      Laboratory Values:  CBC:   Lab Results   Component Value Date    WBC 10.8 06/30/2024    RBC 4.14 (L) 06/30/2024    HGB 13.6 06/30/2024    HCT 40.0 (L) 06/30/2024     06/30/2024       RFP:   Lab Results   Component Value Date     (L) 06/30/2024    K 3.9 06/30/2024     06/30/2024    CO2 25 06/30/2024    BUN 10 06/30/2024    CREATININE 0.69 06/30/2024    CALCIUM 8.6 06/30/2024        LFTs:   Lab Results   Component Value Date    PROT 6.1 (L) 06/30/2024    ALBUMIN 3.0 (L) 06/30/2024     BILITOT 0.5 06/30/2024    ALKPHOS 36 06/30/2024    AST 10 06/30/2024    ALT 13 06/30/2024            Imaging:  I have personally reviewed the images and the radiologist's report.  XR chest 1 view    Result Date: 6/28/2024  Interpreted By:  Roni Gill, STUDY: XR CHEST 1 VIEW;  6/28/2024 2:06 pm   INDICATION: Signs/Symptoms:epigastric pain.   COMPARISON: 03/26/2017 chest radiograph. Abdominal radiographs dated 06/27/2024   ACCESSION NUMBER(S): [id number]   ORDERING CLINICIAN: PILO JORDAN   FINDINGS: AP semi-upright radiograph of the chest was provided.     CARDIOMEDIASTINAL SILHOUETTE: Cardiomediastinal silhouette is stable in size and configuration. Marked tortuosity of the thoracic aorta is noted.   LUNGS: Bibasal atelectasis is noted. There is no segmental consolidation. There is no evidence of pneumothorax.   ABDOMEN: There is visible postoperative pneumoperitoneum. Since yesterday's study, the enteric tube has been removed.   BONES: No acute osseous changes.       1.  Bibasal subsegmental atelectasis. Residual postoperative pneumoperitoneum.       MACRO: None   Signed by: Roni Gill 6/28/2024 4:29 PM Dictation workstation:   [id number]    ECG 12 lead    Result Date: 6/28/2024  Sinus rhythm with short NH Right bundle branch block Abnormal ECG When compared with ECG of 28-JUN-2024 14:21, (unconfirmed) No significant change was found    XR abdomen 1 view    Result Date: 6/27/2024  Interpreted By:  Roni Gill, STUDY: XR ABDOMEN 1 VIEW;  6/27/2024 4:07 pm   INDICATION: Signs/Symptoms:NG tube placement.   COMPARISON: None.   ACCESSION NUMBER(S): [id number]   ORDERING CLINICIAN: RAMSES FELIZ   FINDINGS: NG tube is in place with the tip in the proximal body of the stomach along the greater curvature. There is a nonobstructive bowel gas pattern. Skin staples are noted over the lower abdominal region above the symphysis in the midline. Ostomy device is visible overlying the left lower quadrant.    Visualized lungs show minimal basal subsegmental atelectasis.   Osseous structures demonstrate no acute bony changes.       1.  NG tube present in the stomach as described above. Postoperative changes noted as well.   MACRO: None   Signed by: Roni Gill 6/27/2024 4:42 PM Dictation workstation:   [id number]    CT abdomen pelvis wo IV contrast    Result Date: 6/26/2024  Interpreted By:  cJ Elaine, STUDY: CT ABDOMEN PELVIS WO IV CONTRAST;  6/26/2024 5:19 pm   INDICATION: Signs/Symptoms:increase in abd pain.   COMPARISON: Selected CT abdomen and pelvis examinations as far back as March 26, 2017 including June 23, 2024 CT examination. Images from 05/20/2024 CT-guided pelvic aspiration procedure.   ACCESSION NUMBER(S): [id number]   ORDERING CLINICIAN: PILO JORDAN   TECHNIQUE: CT of the abdomen and pelvis was performed. Contiguous axial images were obtained at 3 mm slice thickness through the abdomen and pelvis. Coronal and sagittal reconstructions at 3 mm slice thickness were performed.  No intravenous or oral contrast agents were administered.   FINDINGS: Please note that the evaluation of vessels, lymph nodes and organs is limited without intravenous contrast.   LOWER CHEST: Increase bronchial impaction right lower lobe. Bibasilar discoid atelectasis increased on the right.   ABDOMEN:   LIVER: Similar appearance with mild scattered homogeneous hypodensities most compatible with cysts although some are too small to characterize.   BILE DUCTS: Normal caliber.   GALLBLADDER: Nondistended containing radiopaque material likely vicarious excretion of contrast.   PANCREAS: Within normal limits.   SPLEEN: Within normal limits.   ADRENAL GLANDS: Within normal limits.   KIDNEYS AND URETERS: The kidneys are normal in size and unremarkable in appearance.  No hydroureteronephrosis or nephroureterolithiasis is identified.   PELVIS:   BLADDER: Unremarkable   REPRODUCTIVE ORGANS: Prostate minimally indents the base  urinary bladder.   BOWEL: Persistent segmental wall thickening involving the sigmoid colon with indistinctness of the surrounding fat compatible with inflammation. There is persistent lack of normal visualization of a portion of the superior wall at site of prior abscess drainage series 203, image 67 with potential contained perforation without remaining drainable abscess. There is similar thickening of the adjacent peritoneum. There is similar prominence of the adjacent lymph nodes including lymph node anterior to the iliac bifurcation measuring 12 mm transverse diameter series 21, image 91. Findings remain compatible with complicated diverticulitis with potential adjacent peritonitis. Underlying mass not reliably excluded. The proximal bowel is nondistended.   VESSELS: Scattered arterial vascular calcifications.   PERITONEUM/RETROPERITONEUM: Minimal persistent free fluid dependent portion of the pelvis inferior to the sigmoid colon coronal series 202, image 73.   ABDOMINAL WALL: Similar indirect fat containing inguinal hernias.   BONES: No acute osseous abnormalities.       1.  Findings compatible with complex sigmoid diverticulitis with persistent segmental wall thickening, and wall abnormality with suggestion of small contained perforation without residual abscess with similar adjacent mesenteric inflammation and peritonitis. 2. Similar adjacent prominent lymph nodes. 3. Underlying mass not reliably excluded. 4. Increase mucous impaction right lower lobe with increase adjacent subsegmental atelectasis.   Jc Elaine discussed the significance and urgency of this critical finding by secure chat with  PILO JORDAN on 6/26/2024 at 7:08 pm.  (**-RCF-**) Findings:  See findings.   Signed by: Jc Elaine 6/26/2024 7:08 PM Dictation workstation:   [id number]    CT guided aspiration of abscess, hematoma, cyst    Result Date: 6/25/2024  Interpreted By:  Vaughn Mccarthy, STUDY: CT GUIDED ASPIRATION OF ABSCESS,  HEMATOMA, CYST;  6/25/2024 10:55 am   INDICATION: Signs/Symptoms:Intra-abdominal abscess drain.   COMPARISON: CT abdomen from 06/23/2024   ACCESSION NUMBER(S): [id number]   ORDERING CLINICIAN: PORTER ROSE   TECHNIQUE:   CONSENT: The patient/patient's POA/next of kin was informed of the nature of the proposed procedure. The purposes, alternatives, risks, and benefits were explained and discussed. All questions were answered and consent was obtained.   RADIATION EXPOSURE:   SEDATION: Moderate conscious IV sedation services (supervision of administration, induction, and maintenance) were provided by the physician performing the procedure with intravenous fentanyl 100 mcg and versed 1 mg for 20 minutes. The physician was assisted by an independent trained observer, an interventional radiology nurse, in the continuous monitoring of patient level of consciousness and physiologic status.   MEDICATION/CONTRAST: No additional   TIME OUT: A time out was performed immediately prior to procedure start with the interventional team, correctly identifying the patient name, date of birth, MRN, procedure, anatomy (including marking of site and side), patient position, procedure consent form, relevant laboratory and imaging test results, antibiotic administration, safety precautions, and procedure-specific equipment needs.   COMPLICATIONS: No immediate adverse events identified.   FINDINGS: Limited axial CT images were obtained through the abdomen at 5 mm slice thickness. The images demonstrated  air pocket adjacent to the sigmoid colon with minimal amount of fluid but no visible air-fluid levels. There has been mild interval improvement in pericolonic fat stranding and sigmoid wall edema/thickening when compared to previous exam. No free air is seen to suggest perforation.. The patient was placed on supine positioning and prepped in the usual sterile manner. 1% lidocaine was used for local anesthesia at the planned skin  insertion site.   Under direct CT guidance, a 5 Romansh Yueh needle/catheter was placed into the collection via  midline anterior abdominal wall approach. After confirmation of position of the needle within the collection, the inner needle/stylette was withdrawn. The content was aspirated and demonstrates purulent fluid.  Subsequently, 10 mL of Omnipaque 350 contrast material was injected into the pocket. The contrast material freely drains into the sigmoid colon. Given these findings the Yueh catheter was removed and no drain was placed in the collection.   Postprocedure images demonstrated no evidence of hemorrhage.   Patient tolerated the procedure without immediate complication. Fluid was sent to the laboratory for further analysis.       Successful CT-guided aspiration of fluid collection in the perisigmoid region as above.   Signed by: Vaughn Mccarthy 6/25/2024 11:22 AM Dictation workstation:   [id number]    CT abdomen pelvis w IV contrast    Result Date: 6/23/2024  Interpreted By:  Flavio Miranda, STUDY: CT ABDOMEN PELVIS W IV CONTRAST;  6/23/2024 6:28 pm   INDICATION: Signs/Symptoms:Constipation for 2 weeks.  Pain to bilateral lower quadrants.   COMPARISON: None.   ACCESSION NUMBER(S): [id number]   ORDERING CLINICIAN: AYAAN SHEETS   TECHNIQUE: Contiguous axial images of the abdomen and pelvis were obtained after the intravenous administration of  contrast. Coronal and sagittal reformatted images were obtained from the axial images.   FINDINGS: There is limited evaluation of the lung bases. Mild basilar atelectasis. Coronary artery atherosclerotic calcifications.   2.8 cm cyst in the left hepatic lobe and several subcentimeter hepatic hypodensities which are too small to characterize. The gallbladder is present. No dilatation common bile duct.   The pancreas, spleen, and adrenal glands appear unremarkable.   Symmetric enhancement of the kidneys. No hydronephrosis.   No evidence of bowel obstruction or acute  appendicitis. Fluid-filled segments of colon. There is colonic diverticulosis with prominent wall thickening and edema of the surrounding the sigmoid colon with findings compatible with colonic perforation and there is a 3.7 x 2.8 cm abscess involving the wall of the sigmoid colon. There prominent lymph nodes superior to sigmoid colon which measure up to 1.6 cm.   Urinary bladder is underdistended and not well evaluated.   Multilevel degenerative change of the lumbar spine.       Colonic diverticulosis with prominent wall thickening and edema of the sigmoid colon compatible with acute diverticulitis and evidence of component of perforation with prominent surrounding edema and a 3.7 cm x 2.8 cm abscess in the wall of the sigmoid colon. Prominent and enlarged lymph nodes superior to the sigmoid colon measure up to 1.6 cm.   Follow-up colonoscopy is recommended after treatment to exclude other underlying pathology including mass/malignancy.   MACRO: None   Signed by: Flavio Miranda 6/23/2024 7:35 PM Dictation workstation:   [id number]        Assessment:  This is a 63 y.o. male who is     POD5, s/p josé miguel procedure for perforated diverticulitis   Adequate progress      Plan:  Supportive care   Regular diet   Other care per medicine   Ok to go home after tolerating diet and ostomy teaching   Follow up with Dr Nunn in 1 week       Arlin Spencer MD [facility]  General Surgery  Office: [phone]  Fax:     [phone]  9:16 AM   07/01/24

## 2024-07-01 NOTE — DISCHARGE INSTRUCTIONS
No heavy lifting more than 20LB for 6-8 weeks   Follow up with Dr Nunn in 1 week   Ostomy care per instruction   Midline incision dressing change per instruction: Pack 3 open areas of midline incision with NS moistened mesalt and cover with clean gauze or ABD pads. Change packing and dressing daily.

## 2024-07-01 NOTE — PROGRESS NOTES
07/01/24 1011   Discharge Planning   Living Arrangements Spouse/significant other   Support Systems Spouse/significant other   Assistance Needed A&OX3; independent with ADLs with no DME; drives; room air baseline and currently room air   Type of Residence Private residence   Number of Stairs to Enter Residence 2   Number of Stairs Within Residence 14   Do you have animals or pets at home? No   Who is requesting discharge planning? Provider   Patient expects to be discharged to: [facility]. Earliest start of care is Tuesday 7/2.   Does the patient need discharge transport arranged? No

## 2024-07-01 NOTE — PROGRESS NOTES
Pt with questions on diet education; reviewed foods that are ok to have with new colostomy. Pts wife and daughter present, they have written instructions and card for this author for any questions after discharge. Pt encouraged to kay for any questions.    Rebeca Shay, MS, RDN, LD, CNSC

## 2024-07-01 NOTE — CARE PLAN
The patient's goals for the shift include  pain control    The clinical goals for the shift include Pt will ambulate this shift      Problem: Pain - Adult  Goal: Verbalizes/displays adequate comfort level or baseline comfort level  Outcome: Progressing     Problem: Safety - Adult  Goal: Free from fall injury  Outcome: Progressing     Problem: Discharge Planning  Goal: Discharge to home or other facility with appropriate resources  Outcome: Progressing     Problem: Chronic Conditions and Co-morbidities  Goal: Patient's chronic conditions and co-morbidity symptoms are monitored and maintained or improved  Outcome: Progressing     Problem: Respiratory  Goal: Clear secretions with interventions this shift  Outcome: Progressing  Goal: Minimize anxiety/maximize coping throughout shift  Outcome: Progressing  Goal: Minimal/no exertional discomfort or dyspnea this shift  Outcome: Progressing  Goal: No signs of respiratory distress (eg. Use of accessory muscles. Peds grunting)  Outcome: Progressing  Goal: Patent airway maintained this shift  Outcome: Progressing  Goal: Tolerate mechanical ventilation evidenced by VS/agitation level this shift  Outcome: Progressing  Goal: Tolerate pulmonary toileting this shift  Outcome: Progressing  Goal: Verbalize decreased shortness of breath this shift  Outcome: Progressing  Goal: Wean oxygen to maintain O2 saturation per order/standard this shift  Outcome: Progressing  Goal: Increase self care and/or family involvement in next 24 hours  Outcome: Progressing     Problem: Infection related to problem list condition  Goal: Infection will resolve through treatment  Outcome: Progressing     Problem: Skin  Goal: Decreased wound size/increased tissue granulation at next dressing change  Outcome: Progressing  Goal: Participates in plan/prevention/treatment measures  Outcome: Progressing  Goal: Prevent/manage excess moisture  Outcome: Progressing  Goal: Prevent/minimize sheer/friction  injuries  Outcome: Progressing  Goal: Promote/optimize nutrition  Outcome: Progressing  Goal: Promote skin healing  Outcome: Progressing

## 2024-07-01 NOTE — NURSING NOTE
Ostomy/ Wound RN:    Patient seen by appointment with wife and daughter at bedside for final education before discharge. Education/demonstration/participation by wife.  Stoma, dark, beefy, red. Peristomal skin clean and intact.    - 75057  57 mm wafer  - 93735 57 mm pouch  - 8805 adapt ring  Supplies provided to family.  Demonstrated and talked through abdominal dressing change.  Wife did fantastic job with meticulous attention to detail.  Patient tolerated well. Wound supplies provided.  Questions asked and answered.    Information provided to resident for C. Message with questions.

## 2024-07-01 NOTE — DISCHARGE SUMMARY
Discharge Diagnosis  Diverticulitis of colon with perforation    Issues Requiring Follow-Up  Diverticulitis s/p Jason procedure and with one week oral Augmentin      Test Results Pending At Discharge  Pending Labs       Order Current Status    Surgical Pathology Exam In process            Hospital Course   Jatinder Keenan is a 63 y.o. male former smoker with a history of CAD s/p MI and PCI to RCA x 2 (2016), HTN, HLD, remote PE and CVA (not on Eliquis) who presented to the hospital for constipation and abdominal pain evaluation. Patient was in pain and was not able to provide further information, and according to the ED he was constipated for 2 weeks. He had visited his PCP and was prescribed Metamucil and an Abd XR was taken, as the patient didn't have a BM and his pain got worse. He was advised by his PCP to present to the ED for further evaluation.      In the ED, on 6/23, he was afebrile, HDS, labs with leukocytosis, initially elevated potassium with the repeat being normal, elevated anion gap, normal lactate and normal EKG. CT AP w/IV Cont showed acute diverticulitis and evidence of perforation and a 3.7 cm x 2.8 cm abscess in the wall of the sigmoid colon and abdominal LAD. He received 1L NS, 15 mg Toradol, morphine, 1 mg dilaudid, Zofran, Cipro, flagyl, and Dr. Nunn was contacted and agreed to see the patient the following morning.    On 6/24, patient was started on cefepime and flagyl and bowel rest per Infectious Disease and Surgery team.  On 6/25, a CT guided aspiration and drainage of the abscess was performed, and the antibiotics were continued along with bowel rest.   On 6/26, his leukocytosis resolved, and he underwent a Jason procedure with Dr. Nunn and was transferred to PACU in stable condition.  On 6/27, the post op day 1, the patient had anticoagulation held for 24 hours, with zofran and morphine give PRN. Nutrition and wound care met with patient for education.  On 6/28, POD 2,  metoprolol was restarted for elevated BP. Dilaudid PRN offered for breakthrough pain. Surgery continued to follow.  On 6/29, POD 3, patient was kept on clears until bowel function improved. Antibiotics continued.  On 6/30, POD 4, patient was cleared for full liquids and to advance as tolerated. Pain medication changed to oxycodone PRN.  On 7/1, POD 5, patient was cleared for regular diet and to go home and tolerating diet and ostomy education. Advised to follow up with Dr. Nunn in one week. Infectious Disease recommended oral Augmentin for one week with discharge.    Throughout the admission, his CAD was managed with crestor and zetia.    Pertinent Physical Exam At Time of Discharge  Physical Exam  Constitutional:       General: He is not in acute distress.     Appearance: Normal appearance. He is not toxic-appearing.   HENT:      Head: Normocephalic and atraumatic.   Eyes:      General: No scleral icterus.     Extraocular Movements: Extraocular movements intact.      Conjunctiva/sclera: Conjunctivae normal.   Cardiovascular:      Rate and Rhythm: Normal rate and regular rhythm.      Pulses: Normal pulses.      Heart sounds: Normal heart sounds. No murmur heard.  Pulmonary:      Effort: Pulmonary effort is normal.      Breath sounds: Normal breath sounds.   Abdominal:      General: Abdomen is flat. Bowel sounds are normal. There is no distension.      Palpations: Abdomen is soft.      Tenderness: There is abdominal tenderness.   Musculoskeletal:      Cervical back: Normal range of motion.      Right lower leg: No edema.      Left lower leg: No edema.   Skin:     General: Skin is warm and dry.      Capillary Refill: Capillary refill takes less than 2 seconds.   Neurological:      General: No focal deficit present.      Mental Status: He is alert.   Psychiatric:         Mood and Affect: Mood normal.         Behavior: Behavior normal.         Home Medications     Medication List      ASK your doctor about these  medications     aspirin 81 mg EC tablet   ezetimibe 10 mg tablet; Commonly known as: Zetia   * magnesium citrate solution; Take 296 mL by mouth 1 time for 1 dose.;   Ask about: Should I take this medication?   * magnesium citrate solution; Take 296 mL by mouth 1 time for 1 dose.;   Ask about: Should I take this medication?   metoprolol succinate XL 25 mg 24 hr tablet; Commonly known as: Toprol-XL   nitroglycerin 0.4 mg SL tablet; Commonly known as: Nitrostat   rosuvastatin 20 mg tablet; Commonly known as: Crestor  * This list has 2 medication(s) that are the same as other medications   prescribed for you. Read the directions carefully, and ask your doctor or   other care provider to review them with you.       Outpatient Follow-Up  Future Appointments   Date Time Provider Department Center   7/18/2024  2:15 PM Pallavi Nunn MD [id number] Rafa Dutta DO

## 2024-07-02 LAB
LABORATORY COMMENT REPORT: NORMAL
PATH REPORT.FINAL DX SPEC: NORMAL
PATH REPORT.GROSS SPEC: NORMAL
PATH REPORT.TOTAL CANCER: NORMAL
RESIDENT REVIEW: NORMAL

## 2024-07-03 ENCOUNTER — PATIENT OUTREACH (OUTPATIENT)
Dept: PRIMARY CARE | Facility: CLINIC | Age: 64
End: 2024-07-03
Payer: COMMERCIAL

## 2024-07-03 NOTE — SIGNIFICANT EVENT
Follow Up Phone Call    Outgoing phone call    Spoke to: Jatinder Keenan Relationship:self   Phone number: [phone]      Outcome: I left a message on answering machine   Chief Complaint   Patient presents with    Constipation     No BM X 2 weeks          Diagnosis:Not applicable

## 2024-07-03 NOTE — PROGRESS NOTES
Discharge Facility: [facility]  Discharge Diagnosis: Diverticulitis of colon with perforation   Admission Date: 6/23/2024  Procedure Date: 6/26/2024 Resection sigmoid colon  Discharge Date: 7/1/2024    PCP Appointment Date: Message to office to schedule  Specialist Appointment Date: 7/8/2024 2:15 PM Dr. Pallavi Nunn, General Surgery  Hospital Encounter and Summary: Linked     Two attempts were made to reach patient within two business days after discharge. Voicemail left with contact information for patient to call back with any non-emergent questions or concerns.

## 2024-07-05 DIAGNOSIS — K57.20 DIVERTICULITIS OF COLON WITH PERFORATION: ICD-10-CM

## 2024-07-05 RX ORDER — OXYCODONE HYDROCHLORIDE 5 MG/1
5 TABLET ORAL EVERY 8 HOURS PRN
Qty: 21 TABLET | Refills: 0 | Status: SHIPPED | OUTPATIENT
Start: 2024-07-05 | End: 2024-07-12

## 2024-07-10 ENCOUNTER — APPOINTMENT (OUTPATIENT)
Dept: PRIMARY CARE | Facility: CLINIC | Age: 64
End: 2024-07-10
Payer: COMMERCIAL

## 2024-07-10 VITALS
BODY MASS INDEX: 25.03 KG/M2 | SYSTOLIC BLOOD PRESSURE: 110 MMHG | DIASTOLIC BLOOD PRESSURE: 62 MMHG | HEART RATE: 68 BPM | WEIGHT: 159.8 LBS | OXYGEN SATURATION: 98 %

## 2024-07-10 DIAGNOSIS — E55.9 VITAMIN D DEFICIENCY: ICD-10-CM

## 2024-07-10 DIAGNOSIS — I10 PRIMARY HYPERTENSION: ICD-10-CM

## 2024-07-10 DIAGNOSIS — J44.9 CHRONIC OBSTRUCTIVE PULMONARY DISEASE, UNSPECIFIED COPD TYPE (MULTI): ICD-10-CM

## 2024-07-10 DIAGNOSIS — K21.9 GASTROESOPHAGEAL REFLUX DISEASE WITHOUT ESOPHAGITIS: ICD-10-CM

## 2024-07-10 DIAGNOSIS — I27.82 CHRONIC PULMONARY EMBOLISM WITHOUT ACUTE COR PULMONALE, UNSPECIFIED PULMONARY EMBOLISM TYPE (MULTI): ICD-10-CM

## 2024-07-10 DIAGNOSIS — Z00.00 ROUTINE GENERAL MEDICAL EXAMINATION AT HEALTH CARE FACILITY: Primary | ICD-10-CM

## 2024-07-10 DIAGNOSIS — E11.9 TYPE 2 DIABETES MELLITUS WITHOUT COMPLICATION, WITHOUT LONG-TERM CURRENT USE OF INSULIN (MULTI): ICD-10-CM

## 2024-07-10 DIAGNOSIS — E78.2 MIXED HYPERLIPIDEMIA: ICD-10-CM

## 2024-07-10 DIAGNOSIS — I25.110 CORONARY ARTERY DISEASE INVOLVING NATIVE CORONARY ARTERY OF NATIVE HEART WITH UNSTABLE ANGINA PECTORIS (MULTI): ICD-10-CM

## 2024-07-10 DIAGNOSIS — G81.94 LEFT HEMIPARESIS (MULTI): ICD-10-CM

## 2024-07-10 DIAGNOSIS — Z93.3 COLOSTOMY STATUS (MULTI): ICD-10-CM

## 2024-07-10 DIAGNOSIS — Z12.5 SCREENING FOR MALIGNANT NEOPLASM OF PROSTATE: ICD-10-CM

## 2024-07-10 DIAGNOSIS — K57.20 DIVERTICULITIS OF COLON WITH PERFORATION: ICD-10-CM

## 2024-07-10 LAB
ALBUMIN SERPL BCP-MCNC: 3.7 G/DL (ref 3.4–5)
ANION GAP SERPL CALC-SCNC: 14 MMOL/L (ref 10–20)
BUN SERPL-MCNC: 21 MG/DL (ref 6–23)
CALCIUM SERPL-MCNC: 9.1 MG/DL (ref 8.6–10.3)
CHLORIDE SERPL-SCNC: 101 MMOL/L (ref 98–107)
CHOLEST SERPL-MCNC: 102 MG/DL (ref 0–199)
CHOLESTEROL/HDL RATIO: 3.3
CO2 SERPL-SCNC: 26 MMOL/L (ref 21–32)
CREAT SERPL-MCNC: 0.81 MG/DL (ref 0.5–1.3)
EGFRCR SERPLBLD CKD-EPI 2021: >90 ML/MIN/1.73M*2
GLUCOSE SERPL-MCNC: 80 MG/DL (ref 74–99)
HDLC SERPL-MCNC: 30.5 MG/DL
LDLC SERPL CALC-MCNC: 51 MG/DL
NON HDL CHOLESTEROL: 72 MG/DL (ref 0–149)
PHOSPHATE SERPL-MCNC: 4.1 MG/DL (ref 2.5–4.9)
POC HEMOGLOBIN A1C: 6.1 % (ref 4.2–6.5)
POTASSIUM SERPL-SCNC: 4.4 MMOL/L (ref 3.5–5.3)
SODIUM SERPL-SCNC: 137 MMOL/L (ref 136–145)
TRIGL SERPL-MCNC: 102 MG/DL (ref 0–149)
VLDL: 20 MG/DL (ref 0–40)

## 2024-07-10 PROCEDURE — 99495 TRANSJ CARE MGMT MOD F2F 14D: CPT | Performed by: FAMILY MEDICINE

## 2024-07-10 PROCEDURE — G0439 PPPS, SUBSEQ VISIT: HCPCS | Performed by: FAMILY MEDICINE

## 2024-07-10 PROCEDURE — 3074F SYST BP LT 130 MM HG: CPT | Performed by: FAMILY MEDICINE

## 2024-07-10 PROCEDURE — G0103 PSA SCREENING: HCPCS

## 2024-07-10 PROCEDURE — 82306 VITAMIN D 25 HYDROXY: CPT

## 2024-07-10 PROCEDURE — 83036 HEMOGLOBIN GLYCOSYLATED A1C: CPT | Performed by: FAMILY MEDICINE

## 2024-07-10 PROCEDURE — 4004F PT TOBACCO SCREEN RCVD TLK: CPT | Performed by: FAMILY MEDICINE

## 2024-07-10 PROCEDURE — 3078F DIAST BP <80 MM HG: CPT | Performed by: FAMILY MEDICINE

## 2024-07-10 PROCEDURE — 80061 LIPID PANEL: CPT

## 2024-07-10 PROCEDURE — 36415 COLL VENOUS BLD VENIPUNCTURE: CPT

## 2024-07-10 PROCEDURE — 3048F LDL-C <100 MG/DL: CPT | Performed by: FAMILY MEDICINE

## 2024-07-10 PROCEDURE — 80069 RENAL FUNCTION PANEL: CPT

## 2024-07-10 RX ORDER — EZETIMIBE 10 MG/1
10 TABLET ORAL DAILY
Qty: 90 TABLET | Refills: 3 | Status: SHIPPED | OUTPATIENT
Start: 2024-07-10 | End: 2025-07-10

## 2024-07-10 RX ORDER — ROSUVASTATIN CALCIUM 20 MG/1
20 TABLET, COATED ORAL DAILY
Qty: 90 TABLET | Refills: 3 | Status: SHIPPED | OUTPATIENT
Start: 2024-07-10 | End: 2025-07-10

## 2024-07-10 RX ORDER — METOPROLOL SUCCINATE 25 MG/1
25 TABLET, EXTENDED RELEASE ORAL DAILY
Qty: 90 TABLET | Refills: 3 | Status: SHIPPED | OUTPATIENT
Start: 2024-07-10 | End: 2025-07-10

## 2024-07-10 ASSESSMENT — ENCOUNTER SYMPTOMS
NUMBNESS: 1
COUGH: 0
HEADACHES: 0
FREQUENCY: 0
SORE THROAT: 0
TROUBLE SWALLOWING: 0
POLYDIPSIA: 0
BLOOD IN STOOL: 0
ABDOMINAL PAIN: 1
EYE REDNESS: 0
ADENOPATHY: 0
CHILLS: 0
CONSTIPATION: 0
DYSURIA: 0
NAUSEA: 0
FATIGUE: 0
BACK PAIN: 0
FEVER: 0
DIARRHEA: 0
TREMORS: 0
DIZZINESS: 0
ARTHRALGIAS: 0
DYSPHORIC MOOD: 0
BRUISES/BLEEDS EASILY: 0
POLYPHAGIA: 0
PALPITATIONS: 0
WEAKNESS: 0
COLOR CHANGE: 0
EYE PAIN: 0
NERVOUS/ANXIOUS: 0
VOMITING: 0
WHEEZING: 0
CHEST TIGHTNESS: 0
HEMATURIA: 0
SHORTNESS OF BREATH: 0

## 2024-07-10 ASSESSMENT — PATIENT HEALTH QUESTIONNAIRE - PHQ9
2. FEELING DOWN, DEPRESSED OR HOPELESS: NOT AT ALL
1. LITTLE INTEREST OR PLEASURE IN DOING THINGS: NOT AT ALL
SUM OF ALL RESPONSES TO PHQ9 QUESTIONS 1 AND 2: 0

## 2024-07-10 ASSESSMENT — ACTIVITIES OF DAILY LIVING (ADL)
BATHING: INDEPENDENT
DRESSING: INDEPENDENT
DOING_HOUSEWORK: INDEPENDENT
MANAGING_FINANCES: INDEPENDENT
GROCERY_SHOPPING: INDEPENDENT
TAKING_MEDICATION: INDEPENDENT

## 2024-07-10 NOTE — PROGRESS NOTES
"Subjective   Reason for Visit: Jatinder Keenan is an 63 y.o. male here for a Medicare Wellness visit.     Past Medical, Surgical, and Family History reviewed and updated in chart.    Reviewed all medications by prescribing practitioner or clinical pharmacist (such as prescriptions, OTCs, herbal therapies and supplements) and documented in the medical record.    HPI  Had a perforated colon diverticulum- Has colostomy right now- will be reversed in 6-8 weeks  Having home health come right now  Still some abdominal pain but takes pain med when needed  No n/v, fever, chills  No hematochezia, melena  No CP, SOB, palpitations  Some numbness when gets swelling in abdomen  No weakness, dizziness, HA, vision changes    Ophtho- due to see  Dentist- Does not see  [place]-  7/10/24  Colonoscopy- 3/28/17- repeat 5 years  XI-  FOBT-  PSA- ordered  UA/Micro-  Lung CT-  Coronary Calcium CT Score-  AAA-  EKG-  Pneumovax-   RSV-   Prevnar-  Flu-  Shingrix-  Td-  Hep C-  Advance Directives- will work on these  AWV- 7/10/24  MDD screen- 7/10/24  ETOH screen- 7/10/24      Patient: Jatinder Keenan  : 1960  PCP: Raza Sharma DO  MRN: [id number]  Program: Transitional Care Management  Status: Enrolled  Effective Dates: 7/3/2024 - present  Responsible Staff: Louisa Marcus RN  Social Determinants to be Addressed: Physical Activity, Social Connections, Stress, Tobacco Use     Jatinder Keenan is a 63 y.o. male presenting today for follow-up after being discharged from the hospital 9 days ago. The main problem requiring admission was Diverticulitis of colon with perforation . The discharge summary and/or Transitional Care Management documentation was reviewed. Medication reconciliation was performed as indicated via the \"Darwin as Reviewed\" timestamp.     Jatinder Keenan was contacted by Transitional Care Management services two days after his discharge. This encounter and supporting documentation was " reviewed.    Review of Systems    /62   Pulse 68   Wt 72.5 kg (159 lb 12.8 oz)   SpO2 98%   BMI 25.03 kg/m²     The complexity of medical decision making for this patient's transitional care is moderate.      Patient Care Team:  Raza Sharma DO as PCP - General (Family Medicine)  Louisa Marcus, KWAKU as Care Manager (Case Management)     Review of Systems   Constitutional:  Negative for chills, fatigue and fever.   HENT:  Negative for congestion, ear discharge, ear pain, hearing loss, nosebleeds, sore throat, tinnitus and trouble swallowing.    Eyes:  Negative for pain, redness and visual disturbance.   Respiratory:  Negative for cough, chest tightness, shortness of breath and wheezing.    Cardiovascular:  Negative for chest pain, palpitations and leg swelling.   Gastrointestinal:  Positive for abdominal pain. Negative for blood in stool, constipation, diarrhea, nausea and vomiting.   Endocrine: Negative for cold intolerance, heat intolerance, polydipsia, polyphagia and polyuria.   Genitourinary:  Negative for dysuria, frequency, hematuria and urgency.   Musculoskeletal:  Negative for arthralgias, back pain and gait problem.   Skin:  Negative for color change and rash.   Neurological:  Positive for numbness. Negative for dizziness, tremors, syncope, weakness and headaches.   Hematological:  Negative for adenopathy. Does not bruise/bleed easily.   Psychiatric/Behavioral:  Negative for dysphoric mood. The patient is not nervous/anxious.        Objective   Vitals:  /62   Pulse 68   Wt 72.5 kg (159 lb 12.8 oz)   SpO2 98%   BMI 25.03 kg/m²       Physical Exam  Vitals and nursing note reviewed.   Constitutional:       General: He is not in acute distress.     Appearance: Normal appearance. He is not ill-appearing.   HENT:      Head: Normocephalic and atraumatic.      Right Ear: Tympanic membrane, ear canal and external ear normal.      Left Ear: Tympanic membrane, ear canal and external ear normal.       Nose: Nose normal.      Mouth/Throat:      Mouth: Mucous membranes are moist.      Pharynx: Oropharynx is clear.   Eyes:      Extraocular Movements: Extraocular movements intact.      Conjunctiva/sclera: Conjunctivae normal.      Pupils: Pupils are equal, round, and reactive to light.   Cardiovascular:      Rate and Rhythm: Normal rate and regular rhythm.      Pulses:           Dorsalis pedis pulses are 1+ on the right side and 1+ on the left side.        Posterior tibial pulses are 1+ on the right side and 1+ on the left side.      Heart sounds: Normal heart sounds.   Pulmonary:      Effort: Pulmonary effort is normal.      Breath sounds: Normal breath sounds.   Abdominal:      General: Abdomen is flat. Bowel sounds are normal.      Palpations: Abdomen is soft. There is no mass.   Musculoskeletal:      Right foot: Normal range of motion. No deformity.      Left foot: Normal range of motion. No deformity.   Feet:      Right foot:      Protective Sensation: 7 sites tested.  7 sites sensed.      Skin integrity: Skin integrity normal.      Toenail Condition: Right toenails are normal.      Left foot:      Protective Sensation: 7 sites tested.  7 sites sensed.      Skin integrity: Skin integrity normal.      Toenail Condition: Left toenails are normal.   Skin:     Capillary Refill: Capillary refill takes less than 2 seconds.   Neurological:      General: No focal deficit present.      Mental Status: He is alert and oriented to person, place, and time.   Psychiatric:         Mood and Affect: Mood normal.         Behavior: Behavior normal.         Assessment/Plan   Problem List Items Addressed This Visit       Vitamin D deficiency    Relevant Orders    Vitamin D 25-Hydroxy,Total (for eval of Vitamin D levels)    Left hemiparesis (Multi)    Hypertension    Relevant Medications    metoprolol succinate XL (Toprol-XL) 25 mg 24 hr tablet    Other Relevant Orders    Renal Function Panel (Completed)    Hyperlipidemia     Relevant Medications    ezetimibe (Zetia) 10 mg tablet    metoprolol succinate XL (Toprol-XL) 25 mg 24 hr tablet    rosuvastatin (Crestor) 20 mg tablet    Other Relevant Orders    Lipid Panel (Completed)    Renal Function Panel (Completed)    GERD (gastroesophageal reflux disease)    Diverticulitis of colon with perforation    Diabetes mellitus, type 2 (Multi)    Relevant Orders    POCT glycosylated hemoglobin (Hb A1C) manually resulted (Completed)    Coronary artery disease    Relevant Medications    metoprolol succinate XL (Toprol-XL) 25 mg 24 hr tablet    Colostomy status (Multi)    Chronic pulmonary embolism without acute cor pulmonale, unspecified pulmonary embolism type (Multi)    Chronic obstructive pulmonary disease (Multi)     Other Visit Diagnoses       Routine general medical examination at health care facility    -  Primary    Screening for malignant neoplasm of prostate        Relevant Orders    Prostate Specific Antigen, Screen        Renewed/continued rest of medications  Checked  labs  Updated Health Maintenance in HPI section  HTN, controlled- continue meds, low sodium diet    CAD- ASA, zetia, crestor, nitroglycerin prn    GERD- avoid trigger foods, weight control    Hyperlipidemia- continue meds, low fat/cholesterol diet    COPD/Chronic PE- continue inhalers, avoid poor air quality    CKD- avoid NSAID's, 6 glasses clear fluid a day    Left hemiparesis- resolved    Colostomy- standard care until reversed    Diverticulitis with rupture- s/p colostomy, follow up with surgeon    DM, type 2- avoid sugars, low carbs, increase CV exercise, continue meds, check feet daily

## 2024-07-11 LAB
25(OH)D3 SERPL-MCNC: 43 NG/ML (ref 30–100)
PSA SERPL-MCNC: 1.18 NG/ML

## 2024-07-12 ENCOUNTER — HOSPITAL ENCOUNTER (EMERGENCY)
Facility: HOSPITAL | Age: 64
Discharge: HOME | End: 2024-07-12
Attending: EMERGENCY MEDICINE
Payer: COMMERCIAL

## 2024-07-12 ENCOUNTER — APPOINTMENT (OUTPATIENT)
Dept: RADIOLOGY | Facility: HOSPITAL | Age: 64
End: 2024-07-12
Payer: COMMERCIAL

## 2024-07-12 VITALS
HEART RATE: 65 BPM | TEMPERATURE: 97.5 F | OXYGEN SATURATION: 96 % | DIASTOLIC BLOOD PRESSURE: 67 MMHG | HEIGHT: 67 IN | RESPIRATION RATE: 18 BRPM | BODY MASS INDEX: 25.11 KG/M2 | SYSTOLIC BLOOD PRESSURE: 119 MMHG | WEIGHT: 160 LBS

## 2024-07-12 DIAGNOSIS — K40.90 NON-RECURRENT UNILATERAL INGUINAL HERNIA WITHOUT OBSTRUCTION OR GANGRENE: Primary | ICD-10-CM

## 2024-07-12 LAB
ALBUMIN SERPL BCP-MCNC: 3.3 G/DL (ref 3.4–5)
ALP SERPL-CCNC: 56 U/L (ref 33–136)
ALT SERPL W P-5'-P-CCNC: 25 U/L (ref 10–52)
ANION GAP SERPL CALC-SCNC: 12 MMOL/L (ref 10–20)
AST SERPL W P-5'-P-CCNC: 28 U/L (ref 9–39)
BASOPHILS # BLD AUTO: 0.07 X10*3/UL (ref 0–0.1)
BASOPHILS NFR BLD AUTO: 1 %
BILIRUB SERPL-MCNC: 0.2 MG/DL (ref 0–1.2)
BUN SERPL-MCNC: 20 MG/DL (ref 6–23)
CALCIUM SERPL-MCNC: 8.8 MG/DL (ref 8.6–10.3)
CHLORIDE SERPL-SCNC: 103 MMOL/L (ref 98–107)
CO2 SERPL-SCNC: 27 MMOL/L (ref 21–32)
CREAT SERPL-MCNC: 0.74 MG/DL (ref 0.5–1.3)
EGFRCR SERPLBLD CKD-EPI 2021: >90 ML/MIN/1.73M*2
EOSINOPHIL # BLD AUTO: 0.68 X10*3/UL (ref 0–0.7)
EOSINOPHIL NFR BLD AUTO: 9.5 %
ERYTHROCYTE [DISTWIDTH] IN BLOOD BY AUTOMATED COUNT: 11.9 % (ref 11.5–14.5)
GLUCOSE SERPL-MCNC: 131 MG/DL (ref 74–99)
HCT VFR BLD AUTO: 34.5 % (ref 41–52)
HGB BLD-MCNC: 11.7 G/DL (ref 13.5–17.5)
IMM GRANULOCYTES # BLD AUTO: 0.03 X10*3/UL (ref 0–0.7)
IMM GRANULOCYTES NFR BLD AUTO: 0.4 % (ref 0–0.9)
LACTATE SERPL-SCNC: 1.1 MMOL/L (ref 0.4–2)
LYMPHOCYTES # BLD AUTO: 2.39 X10*3/UL (ref 1.2–4.8)
LYMPHOCYTES NFR BLD AUTO: 33.4 %
MCH RBC QN AUTO: 33.4 PG (ref 26–34)
MCHC RBC AUTO-ENTMCNC: 33.9 G/DL (ref 32–36)
MCV RBC AUTO: 99 FL (ref 80–100)
MONOCYTES # BLD AUTO: 0.57 X10*3/UL (ref 0.1–1)
MONOCYTES NFR BLD AUTO: 8 %
NEUTROPHILS # BLD AUTO: 3.42 X10*3/UL (ref 1.2–7.7)
NEUTROPHILS NFR BLD AUTO: 47.7 %
NRBC BLD-RTO: 0 /100 WBCS (ref 0–0)
PLATELET # BLD AUTO: 298 X10*3/UL (ref 150–450)
POTASSIUM SERPL-SCNC: 4.7 MMOL/L (ref 3.5–5.3)
PROT SERPL-MCNC: 6.4 G/DL (ref 6.4–8.2)
RBC # BLD AUTO: 3.5 X10*6/UL (ref 4.5–5.9)
SODIUM SERPL-SCNC: 137 MMOL/L (ref 136–145)
WBC # BLD AUTO: 7.2 X10*3/UL (ref 4.4–11.3)

## 2024-07-12 PROCEDURE — 2550000001 HC RX 255 CONTRASTS: Performed by: PHYSICIAN ASSISTANT

## 2024-07-12 PROCEDURE — 96361 HYDRATE IV INFUSION ADD-ON: CPT

## 2024-07-12 PROCEDURE — 36415 COLL VENOUS BLD VENIPUNCTURE: CPT | Performed by: PHYSICIAN ASSISTANT

## 2024-07-12 PROCEDURE — 99284 EMERGENCY DEPT VISIT MOD MDM: CPT | Mod: 25

## 2024-07-12 PROCEDURE — 74177 CT ABD & PELVIS W/CONTRAST: CPT | Performed by: RADIOLOGY

## 2024-07-12 PROCEDURE — 96375 TX/PRO/DX INJ NEW DRUG ADDON: CPT

## 2024-07-12 PROCEDURE — 85025 COMPLETE CBC W/AUTO DIFF WBC: CPT | Performed by: PHYSICIAN ASSISTANT

## 2024-07-12 PROCEDURE — 74177 CT ABD & PELVIS W/CONTRAST: CPT

## 2024-07-12 PROCEDURE — 83605 ASSAY OF LACTIC ACID: CPT | Performed by: PHYSICIAN ASSISTANT

## 2024-07-12 PROCEDURE — 96374 THER/PROPH/DIAG INJ IV PUSH: CPT | Mod: 59

## 2024-07-12 PROCEDURE — 80053 COMPREHEN METABOLIC PANEL: CPT | Performed by: PHYSICIAN ASSISTANT

## 2024-07-12 PROCEDURE — 2500000004 HC RX 250 GENERAL PHARMACY W/ HCPCS (ALT 636 FOR OP/ED): Performed by: PHYSICIAN ASSISTANT

## 2024-07-12 RX ORDER — DOCUSATE SODIUM 100 MG/1
100 CAPSULE, LIQUID FILLED ORAL EVERY 12 HOURS
Qty: 60 CAPSULE | Refills: 0 | Status: SHIPPED | OUTPATIENT
Start: 2024-07-12 | End: 2024-08-11

## 2024-07-12 RX ORDER — OXYCODONE AND ACETAMINOPHEN 5; 325 MG/1; MG/1
1 TABLET ORAL EVERY 6 HOURS PRN
Qty: 12 TABLET | Refills: 0 | Status: SHIPPED | OUTPATIENT
Start: 2024-07-12 | End: 2024-07-15

## 2024-07-12 RX ORDER — ONDANSETRON HYDROCHLORIDE 2 MG/ML
4 INJECTION, SOLUTION INTRAVENOUS ONCE
Status: COMPLETED | OUTPATIENT
Start: 2024-07-12 | End: 2024-07-12

## 2024-07-12 RX ORDER — IBUPROFEN 600 MG/1
600 TABLET ORAL EVERY 6 HOURS PRN
Qty: 28 TABLET | Refills: 0 | Status: SHIPPED | OUTPATIENT
Start: 2024-07-12 | End: 2024-07-19

## 2024-07-12 RX ORDER — MORPHINE SULFATE 4 MG/ML
4 INJECTION INTRAVENOUS ONCE
Status: COMPLETED | OUTPATIENT
Start: 2024-07-12 | End: 2024-07-12

## 2024-07-12 RX ADMIN — SODIUM CHLORIDE 1000 ML: 9 INJECTION, SOLUTION INTRAVENOUS at 14:20

## 2024-07-12 RX ADMIN — ONDANSETRON 4 MG: 2 INJECTION INTRAMUSCULAR; INTRAVENOUS at 14:22

## 2024-07-12 RX ADMIN — IOHEXOL 75 ML: 350 INJECTION, SOLUTION INTRAVENOUS at 14:49

## 2024-07-12 RX ADMIN — MORPHINE SULFATE 4 MG: 4 INJECTION INTRAVENOUS at 14:24

## 2024-07-12 ASSESSMENT — COLUMBIA-SUICIDE SEVERITY RATING SCALE - C-SSRS
1. IN THE PAST MONTH, HAVE YOU WISHED YOU WERE DEAD OR WISHED YOU COULD GO TO SLEEP AND NOT WAKE UP?: NO
2. HAVE YOU ACTUALLY HAD ANY THOUGHTS OF KILLING YOURSELF?: NO
6. HAVE YOU EVER DONE ANYTHING, STARTED TO DO ANYTHING, OR PREPARED TO DO ANYTHING TO END YOUR LIFE?: NO

## 2024-07-12 ASSESSMENT — PAIN DESCRIPTION - LOCATION
LOCATION: GROIN
LOCATION: GROIN

## 2024-07-12 ASSESSMENT — PAIN DESCRIPTION - ORIENTATION
ORIENTATION: LEFT
ORIENTATION: LEFT

## 2024-07-12 ASSESSMENT — LIFESTYLE VARIABLES
EVER HAD A DRINK FIRST THING IN THE MORNING TO STEADY YOUR NERVES TO GET RID OF A HANGOVER: NO
HAVE PEOPLE ANNOYED YOU BY CRITICIZING YOUR DRINKING: NO
HAVE YOU EVER FELT YOU SHOULD CUT DOWN ON YOUR DRINKING: NO
EVER FELT BAD OR GUILTY ABOUT YOUR DRINKING: NO
TOTAL SCORE: 0

## 2024-07-12 ASSESSMENT — PAIN SCALES - GENERAL
PAINLEVEL_OUTOF10: 4
PAINLEVEL_OUTOF10: 7

## 2024-07-12 ASSESSMENT — PAIN - FUNCTIONAL ASSESSMENT: PAIN_FUNCTIONAL_ASSESSMENT: 0-10

## 2024-07-12 NOTE — ED PROVIDER NOTES
HPI   Chief Complaint   Patient presents with    Groin Pain     Had abdominal surgery 1 week ago with colostomy bag placement now having pain with a lump to his left groin x few days Concern for hernia        Patient reports 3 days of left groin pain.  He also reports swelling and discomfort.  He was recommended to come in by his surgeon, Dr. Nunn.  Patient had recent colostomy placement and removal of colon.  He states that the left inguinal area has become increasingly swollen and painful.  He denies any vomiting.  He is still having output into his colostomy bag.  He has not had fevers.  No testicular pain.      History provided by:  Patient and spouse                      [place] Coma Scale Score: 15                     Patient History   Past Medical History:   Diagnosis Date    Acute maxillary sinusitis, unspecified 04/03/2015    Acute maxillary sinusitis    Body mass index (BMI) 37.0-37.9, adult 07/17/2019    BMI 37.0-37.9, adult    Chest pain on breathing 09/10/2015    Chest pain on respiration    Contact dermatitis due to poison ivy 06/22/2024    COVID-19 virus infection 03/16/2023    Laceration of left elbow 03/16/2023    Lumbago with sciatica, left side 07/19/2017    Acute left-sided low back pain with left-sided sciatica    Muscle pain 03/16/2023    Other specified abnormal findings of blood chemistry     Abnormal liver function test    Personal history of diseases of the skin and subcutaneous tissue 01/30/2014    History of folliculitis    Personal history of other diseases of the digestive system 03/29/2017    History of lower gastrointestinal bleeding    Personal history of other diseases of the digestive system 02/12/2018    History of gastrointestinal hemorrhage    Personal history of other specified conditions 01/30/2014    History of headache    Personal history of other specified conditions 11/15/2018    History of epigastric pain    Personal history of pulmonary embolism 06/20/2022    History  of pulmonary embolism    Polymyalgia rheumatica (Multi) 11/15/2018    Polymyalgia    Weakness 05/07/2018    Left-sided weakness     Past Surgical History:   Procedure Laterality Date    COLONOSCOPY  03/29/2017    Complete Colonoscopy    CORONARY ANGIOPLASTY WITH STENT PLACEMENT  02/06/2018    Cath Stent Placement    ESOPHAGOGASTRODUODENOSCOPY  03/29/2017    Diagnostic Esophagogastroduodenoscopy    HERNIA REPAIR  06/03/2013    Hernia Repair    LUNG SURGERY  09/28/2013    Lung Surgery    MR HEAD ANGIO WO IV CONTRAST  4/30/2018    MR HEAD ANGIO WO IV CONTRAST 4/30/2018 [facility]    MR NECK ANGIO WO IV CONTRAST  4/30/2018    MR NECK ANGIO WO IV CONTRAST 4/30/2018 [facility]    OTHER SURGICAL HISTORY  06/03/2013    Spinal Diskectomy Lumbar     Family History   Problem Relation Name Age of Onset    Diabetes Mother      Heart failure Father       Social History     Tobacco Use    Smoking status: Some Days     Current packs/day: 0.25     Average packs/day: 0.3 packs/day for 15.0 years (3.8 ttl pk-yrs)     Types: Cigarettes    Smokeless tobacco: Never   Substance Use Topics    Alcohol use: Yes     Comment: socially    Drug use: Never       Physical Exam   ED Triage Vitals [07/12/24 1320]   Temperature Heart Rate Respirations BP   36.4 °C (97.5 °F) 65 18 119/67      Pulse Ox Temp Source Heart Rate Source Patient Position   96 % Skin Monitor Lying      BP Location FiO2 (%)     Right arm --       Physical Exam  Vitals and nursing note reviewed.   Constitutional:       General: He is not in acute distress.     Appearance: Normal appearance. He is not toxic-appearing.   HENT:      Head: Normocephalic and atraumatic.      Nose: Nose normal.      Mouth/Throat:      Mouth: Mucous membranes are moist.      Pharynx: Oropharynx is clear.   Eyes:      Extraocular Movements: Extraocular movements intact.      Conjunctiva/sclera: Conjunctivae normal.      Pupils: Pupils are equal, round, and reactive to light.    Cardiovascular:      Rate and Rhythm: Normal rate and regular rhythm.      Pulses: Normal pulses.      Heart sounds: Normal heart sounds.   Pulmonary:      Effort: Pulmonary effort is normal. No respiratory distress.      Breath sounds: Normal breath sounds.   Abdominal:      General: Abdomen is flat. Bowel sounds are normal.      Palpations: Abdomen is soft.      Tenderness: There is no abdominal tenderness.      Hernia: A hernia is present. Hernia is present in the left inguinal area.   Musculoskeletal:         General: Normal range of motion.      Cervical back: Normal range of motion and neck supple.   Skin:     General: Skin is warm and dry.      Coloration: Skin is not jaundiced or pale.      Findings: No bruising.   Neurological:      General: No focal deficit present.      Mental Status: He is alert and oriented to person, place, and time. Mental status is at baseline.   Psychiatric:         Mood and Affect: Mood normal.         Behavior: Behavior normal.         ED Course & MDM   Diagnoses as of 07/12/24 1906   Non-recurrent unilateral inguinal hernia without obstruction or gangrene       Medical Decision Making  Summary:  Medical Decision Making:   Patient presented as described in HPI. Patient case including ROS, PE, and treatment and plan discussed with ED attending if attached as cosigner. Results from labs and or imaging included below if completed. Jatinder MATA Socorrorogeliokaitlynleonardo  is a 63 y.o. coming in for Patient presents with:  Groin Pain: Had abdominal surgery 1 week ago with colostomy bag placement now having pain with a lump to his left groin x few days Concern for hernia   .  Due to patient's complaint of left groin swelling this area is evaluated and he does have a bulge to the area.  It is very tender to palpate and unable to be reduced.  Lab work as well as imaging is completed.  Lab work showed no acute concerning abnormalities.  Normal blood cell count.  Normal lactate.  A CT was completed of the  area and there is a hernia noted with no bowel in it.  Patient does feel improved after pain medication.  Spoke with Dr. Nunn who evaluated the patient and believes that patient can be discharged home on pain meds and will follow-up with her outpatient.  Patient is in agreement with this plan.  He states he does pull up to go home.  Will be given NSAIDs as well as pain medication with narcotic pain meds and placed on stool softener.  He is advised patient is also seen by the attending physician.  On return precautions that include any development of fevers, vomiting or worsening pain.      Disposition is completed with shared decision making with the patient or guardian present with the patient. They were advised to follow up with PCP or recommended provider in 2-3 days for another evaluation and exam. I advised the patient to return or go to closest emergency room immediately if symptoms change, get worse, or new symptoms develop prior to follow up. I explained the plan and treatment course. Patient/guardian is in agreement with plan, treatment course, and follow up and state that they will comply.    Labs Reviewed  CBC WITH AUTO DIFFERENTIAL - Abnormal     WBC                           7.2                    nRBC                          0.0                    RBC                           3.50 (*)               Hemoglobin                    11.7 (*)               Hematocrit                    34.5 (*)               MCV                           99                     MCH                           33.4                   MCHC                          33.9                   RDW                           11.9                   Platelets                     298                    Neutrophils %                 47.7                   Immature Granulocytes %, Automated   0.4                    Lymphocytes %                 33.4                   Monocytes %                   8.0                    Eosinophils %                  9.5                    Basophils %                   1.0                    Neutrophils Absolute          3.42                   Immature Granulocytes Absolute, Au*   0.03                   Lymphocytes Absolute          2.39                   Monocytes Absolute            0.57                   Eosinophils Absolute          0.68                   Basophils Absolute            0.07                COMPREHENSIVE METABOLIC PANEL - Abnormal     Glucose                       131 (*)                Sodium                        137                    Potassium                     4.7                    Chloride                      103                    Bicarbonate                   27                     Anion Gap                     12                     Urea Nitrogen                 20                     Creatinine                    0.74                   eGFR                          >90                    Calcium                       8.8                    Albumin                       3.3 (*)                Alkaline Phosphatase          56                     Total Protein                 6.4                    AST                           28                     Bilirubin, Total              0.2                    ALT                           25                  LACTATE - Normal   CT abdomen pelvis w IV contrast   Final Result    Interval postoperative changes of partial left colectomy, left lower    quadrant end colostomy as well as rectal stump. Intraperitoneal free    air most localized in the upper abdomen anteriorly may be related to    the surgery. No bowel obstruction.          Small elongated left inguinal hernia which contains fat and small    volume fluid. Increased inflammatory fat stranding adjacent to this    hernia.          Enlarged prostate.          Increased irregular predominantly dependent opacities of the lung    bases may represent a combination of infiltrates and  atelectasis/scar.                      MACRO:    None.          Signed by: Zurdo London 7/12/2024 3:28 PM    Dictation workstation:   [id number]                            Tests/Medications/Escalations of Care considered but not given: As in MDM    Patient care discussed with: N/A  Social Determinants affecting care: N/A    Final diagnosis and disposition as documented     Diagnoses as of 07/12/24 1907  Non-recurrent unilateral inguinal hernia without obstruction or gangrene       Shared decision making was completed and determined that patient will be discharged. I discussed the differential; results and discharge plan with the patient and/or family/friend/caregiver if present.  I emphasized the importance of follow-up with the physician I referred them to in the timeframe recommended.  I explained reasons for the patient to return to the Emergency Department. They agreed that if they feel their condition is worsening or if they have any other concern they should call 911 immediately for further assistance. I gave the patient an opportunity to ask all questions they had and answered all of them accordingly. They understand return precautions and discharge instructions. The patient and/or family/friend/caregiver expressed understanding verbally and that they would comply.     Disposition: Discharge      This note has been transcribed using voice recognition and may contain grammatical errors, misplaced words, incorrect words, incorrect phrases or other errors.         Procedure  Procedures     Ricardo Newsome PA-C  07/12/24 1909

## 2024-07-12 NOTE — PROGRESS NOTES
The patient was seen by the midlevel/resident.  I have personally saw the patient and made/approved the management plan and take responsibility for the patient management.  I reviewed the EKG's (when done) and agree with the interpretation.  I have seen and examined the patient; agree with the workup, evaluation, MDM, and diagnosis.  The care plan has been discussed with the midlevel/resident; I have reviewed the note and agree with the documented findings.     Presents with left groin pain and has swelling.  Appears to have a hernia that we are unable to reduce in the emergency room.  Approximately a week and a half ago had a left hemicolectomy by Dr. Nunn.  Plan is to do CT scan pain control and labs and contact Dr. Nunn for possible reduction.  Spoke to patient and family at bedside.  Patient was seen in the emergency room by Dr. Nunn who agrees with plan to discharge home after reviewing the CT which did not show severe hernia just some fluid and fat.  Patient stable and will be discharged home.  Diagnoses as of 07/12/24 1650   Non-recurrent unilateral inguinal hernia without obstruction or gangrene     Ricardo Guevara MD

## 2024-07-12 NOTE — ED TRIAGE NOTES
Patient here for groin pain Had abdominal surgery 1 week ago with colostomy bag placement now having pain with a lump to his left groin x few days Concern for hernia

## 2024-07-18 ENCOUNTER — APPOINTMENT (OUTPATIENT)
Dept: SURGERY | Facility: CLINIC | Age: 64
End: 2024-07-18
Payer: COMMERCIAL

## 2024-07-18 ENCOUNTER — PATIENT OUTREACH (OUTPATIENT)
Dept: PRIMARY CARE | Facility: CLINIC | Age: 64
End: 2024-07-18

## 2024-07-18 VITALS
BODY MASS INDEX: 25.58 KG/M2 | TEMPERATURE: 97.7 F | DIASTOLIC BLOOD PRESSURE: 67 MMHG | OXYGEN SATURATION: 95 % | HEIGHT: 67 IN | HEART RATE: 62 BPM | WEIGHT: 163 LBS | SYSTOLIC BLOOD PRESSURE: 109 MMHG

## 2024-07-18 DIAGNOSIS — K40.90 UNILATERAL INGUINAL HERNIA WITHOUT OBSTRUCTION OR GANGRENE, RECURRENCE NOT SPECIFIED: Primary | ICD-10-CM

## 2024-07-18 PROCEDURE — 3008F BODY MASS INDEX DOCD: CPT | Performed by: SURGERY

## 2024-07-18 PROCEDURE — 3048F LDL-C <100 MG/DL: CPT | Performed by: SURGERY

## 2024-07-18 PROCEDURE — 3078F DIAST BP <80 MM HG: CPT | Performed by: SURGERY

## 2024-07-18 PROCEDURE — 4004F PT TOBACCO SCREEN RCVD TLK: CPT | Performed by: SURGERY

## 2024-07-18 PROCEDURE — 3074F SYST BP LT 130 MM HG: CPT | Performed by: SURGERY

## 2024-07-18 PROCEDURE — 99024 POSTOP FOLLOW-UP VISIT: CPT | Performed by: SURGERY

## 2024-07-18 RX ORDER — OXYCODONE AND ACETAMINOPHEN 5; 325 MG/1; MG/1
1 TABLET ORAL EVERY 6 HOURS PRN
Qty: 5 TABLET | Refills: 0 | Status: SHIPPED | OUTPATIENT
Start: 2024-07-18 | End: 2024-07-25

## 2024-07-18 ASSESSMENT — PAIN SCALES - GENERAL: PAINLEVEL: 4

## 2024-07-18 NOTE — PROGRESS NOTES
Unable to reach patient for call back after patient's follow up appointment with PCP. LVM with call back number for patient to call if needed.

## 2024-07-18 NOTE — PROGRESS NOTES
Subjective   Patient ID: Jatinder Keenan is a 63 y.o. male who presents for Post-op (POV Colostomy).  HPI this is a pleasant patient who had an emergency surgery for perforated diverticulitis his operation was June 26, 2014.  The pathology showed extensive mass abscess formation and diverticulitis in that region.  He is home with a colostomy and has been doing well although he did have a trip to the emergency room last week because of pain in his left groin.  He was found to have a left groin hernia and this is very bothersome to him but on the CT scan in the emergency room there apparently is no bowel in this therefore it is not an incarcerated or strangulated hernia most likely just acutely inflamed because of the diverticulitis that he had.  The patient describes this as being very uncomfortable and at times he has to lay down and put some pressure on that region in order to make it feel better and then he is able to get up and walk around again.  He also had questions about what he can and cannot eat and whether he can drive.    Review of Systems  10 point review is basically negative he is eating he is moving his bowels the colostomy is functioning well and he just recently changed the appliance himself without any aid.  Objective   Physical Exam abdomen is flat soft and basically nontender incision is clean there is only 1 tiny area that has just a dot of granulation tissue and this was covered with a Band-Aid.    Assessment/Plan this patient now has 2 problems a colostomy which she would like to have reversed as soon as possible and also a left inguinal hernia.  When questioned further the patient states that he is more concerned about the colostomy and would like to be rid of that.  I explained to him that the 2 procedures cannot be combined and done at the same time.  If he is able and wants to put up with the hernia for a while longer we can make plans for his colostomy closure in 3 months or so and  then address the hernia after that.  If it anytime however the hernia becomes far more bothersome we may have to do that sooner but that most likely would end up being a open repair.           Pallavi Nunn MD 07/18/24 3:30 PM

## 2024-08-09 LAB
ATRIAL RATE: 62 BPM
P AXIS: -12 DEGREES
P OFFSET: 196 MS
P ONSET: 173 MS
PR INTERVAL: 90 MS
Q ONSET: 218 MS
QRS COUNT: 10 BEATS
QRS DURATION: 130 MS
QT INTERVAL: 458 MS
QTC CALCULATION(BAZETT): 464 MS
QTC FREDERICIA: 463 MS
R AXIS: -21 DEGREES
T AXIS: 34 DEGREES
T OFFSET: 447 MS
VENTRICULAR RATE: 62 BPM

## 2024-08-12 ENCOUNTER — APPOINTMENT (OUTPATIENT)
Dept: SURGERY | Facility: CLINIC | Age: 64
End: 2024-08-12
Payer: COMMERCIAL

## 2024-08-12 VITALS
HEART RATE: 64 BPM | WEIGHT: 167.6 LBS | TEMPERATURE: 97.5 F | HEIGHT: 67 IN | SYSTOLIC BLOOD PRESSURE: 116 MMHG | DIASTOLIC BLOOD PRESSURE: 80 MMHG | BODY MASS INDEX: 26.3 KG/M2 | RESPIRATION RATE: 16 BRPM | OXYGEN SATURATION: 96 %

## 2024-08-12 DIAGNOSIS — Z93.3 COLOSTOMY STATUS (MULTI): Primary | ICD-10-CM

## 2024-08-12 PROCEDURE — 3008F BODY MASS INDEX DOCD: CPT | Performed by: SURGERY

## 2024-08-12 PROCEDURE — 3074F SYST BP LT 130 MM HG: CPT | Performed by: SURGERY

## 2024-08-12 PROCEDURE — 99024 POSTOP FOLLOW-UP VISIT: CPT | Performed by: SURGERY

## 2024-08-12 PROCEDURE — 3048F LDL-C <100 MG/DL: CPT | Performed by: SURGERY

## 2024-08-12 PROCEDURE — 3079F DIAST BP 80-89 MM HG: CPT | Performed by: SURGERY

## 2024-08-13 NOTE — PROGRESS NOTES
Subjective   Patient ID: Jatinder Keenan is a 63 y.o. male who presents for Post-op (POV Colostomy).  HPI  Patient is here again today just to discuss both his hernia and also the colostomy.  He is very eager to have his colostomy reversed even though the hernia is bothersome and that is more his focus.  He has found ways to reduce the hernia at home so that it is not as painful and he wears a belt of sorts that seems to be helpful.  He was wondering if there was any other device he could purchase and I advised him to check into a TRuss. Review of Systems 10 point review is otherwise    Objective   Physical Exam head is normocephalic atraumatic abdomen is flat soft and nontender incision is clean and dry.    Assessment/Plan  His operation for his sigmoid colectomy was the end of June the soonest I would consider doing this reversal would be 3 months and that would be the end of September therefore we began to talk about the operation today he would be an open procedure the Anitha anastomosis we he would have a nasogastric tube a Pineda catheter ureteral catheters and a week long stay in the hospital.  Of course the concern would be the anastomosis in any complications related to a leak would be RQ just concerned.  I discussed with the patient that smokers tend to have more complications and encouraged him to stop smoking.           Pallavi Nunn MD 08/13/24 4:38 PM

## 2024-08-14 ENCOUNTER — TELEPHONE (OUTPATIENT)
Dept: WOUND CARE | Facility: HOSPITAL | Age: 64
End: 2024-08-14

## 2024-08-14 DIAGNOSIS — Z01.818 PREOPERATIVE CLEARANCE: ICD-10-CM

## 2024-09-10 ENCOUNTER — PRE-ADMISSION TESTING (OUTPATIENT)
Dept: PREADMISSION TESTING | Facility: HOSPITAL | Age: 64
End: 2024-09-10
Payer: COMMERCIAL

## 2024-09-10 VITALS
TEMPERATURE: 97 F | BODY MASS INDEX: 26.99 KG/M2 | OXYGEN SATURATION: 96 % | HEIGHT: 67 IN | WEIGHT: 171.96 LBS | RESPIRATION RATE: 16 BRPM | DIASTOLIC BLOOD PRESSURE: 94 MMHG | HEART RATE: 71 BPM | SYSTOLIC BLOOD PRESSURE: 151 MMHG

## 2024-09-10 DIAGNOSIS — Z01.818 PREOPERATIVE CLEARANCE: ICD-10-CM

## 2024-09-10 DIAGNOSIS — Z01.818 PRE-OPERATIVE EXAM: Primary | ICD-10-CM

## 2024-09-10 DIAGNOSIS — Z93.3 COLOSTOMY STATUS (MULTI): ICD-10-CM

## 2024-09-10 LAB
ABO GROUP (TYPE) IN BLOOD: NORMAL
ALBUMIN SERPL BCP-MCNC: 4.2 G/DL (ref 3.4–5)
ALP SERPL-CCNC: 53 U/L (ref 33–136)
ALT SERPL W P-5'-P-CCNC: 15 U/L (ref 10–52)
ANION GAP SERPL CALC-SCNC: 10 MMOL/L (ref 10–20)
ANTIBODY SCREEN: NORMAL
AST SERPL W P-5'-P-CCNC: 15 U/L (ref 9–39)
BASOPHILS # BLD AUTO: 0.07 X10*3/UL (ref 0–0.1)
BASOPHILS NFR BLD AUTO: 0.8 %
BILIRUB SERPL-MCNC: 0.4 MG/DL (ref 0–1.2)
BUN SERPL-MCNC: 19 MG/DL (ref 6–23)
CALCIUM SERPL-MCNC: 9.6 MG/DL (ref 8.6–10.3)
CHLORIDE SERPL-SCNC: 105 MMOL/L (ref 98–107)
CO2 SERPL-SCNC: 29 MMOL/L (ref 21–32)
CREAT SERPL-MCNC: 0.83 MG/DL (ref 0.5–1.3)
EGFRCR SERPLBLD CKD-EPI 2021: >90 ML/MIN/1.73M*2
EOSINOPHIL # BLD AUTO: 0.37 X10*3/UL (ref 0–0.7)
EOSINOPHIL NFR BLD AUTO: 4.4 %
ERYTHROCYTE [DISTWIDTH] IN BLOOD BY AUTOMATED COUNT: 12.4 % (ref 11.5–14.5)
GLUCOSE SERPL-MCNC: 83 MG/DL (ref 74–99)
HCT VFR BLD AUTO: 46.2 % (ref 41–52)
HGB BLD-MCNC: 15.7 G/DL (ref 13.5–17.5)
IMM GRANULOCYTES # BLD AUTO: 0.04 X10*3/UL (ref 0–0.7)
IMM GRANULOCYTES NFR BLD AUTO: 0.5 % (ref 0–0.9)
LYMPHOCYTES # BLD AUTO: 2.81 X10*3/UL (ref 1.2–4.8)
LYMPHOCYTES NFR BLD AUTO: 33.3 %
MCH RBC QN AUTO: 33.2 PG (ref 26–34)
MCHC RBC AUTO-ENTMCNC: 34 G/DL (ref 32–36)
MCV RBC AUTO: 98 FL (ref 80–100)
MONOCYTES # BLD AUTO: 0.66 X10*3/UL (ref 0.1–1)
MONOCYTES NFR BLD AUTO: 7.8 %
NEUTROPHILS # BLD AUTO: 4.5 X10*3/UL (ref 1.2–7.7)
NEUTROPHILS NFR BLD AUTO: 53.2 %
NRBC BLD-RTO: 0 /100 WBCS (ref 0–0)
PLATELET # BLD AUTO: 259 X10*3/UL (ref 150–450)
POTASSIUM SERPL-SCNC: 5 MMOL/L (ref 3.5–5.3)
PROT SERPL-MCNC: 6.7 G/DL (ref 6.4–8.2)
RBC # BLD AUTO: 4.73 X10*6/UL (ref 4.5–5.9)
RH FACTOR (ANTIGEN D): NORMAL
SODIUM SERPL-SCNC: 139 MMOL/L (ref 136–145)
WBC # BLD AUTO: 8.5 X10*3/UL (ref 4.4–11.3)

## 2024-09-10 PROCEDURE — 36415 COLL VENOUS BLD VENIPUNCTURE: CPT

## 2024-09-10 PROCEDURE — 85025 COMPLETE CBC W/AUTO DIFF WBC: CPT

## 2024-09-10 PROCEDURE — 86850 RBC ANTIBODY SCREEN: CPT

## 2024-09-10 PROCEDURE — 84075 ASSAY ALKALINE PHOSPHATASE: CPT

## 2024-09-10 PROCEDURE — 99204 OFFICE O/P NEW MOD 45 MIN: CPT | Performed by: REGISTERED NURSE

## 2024-09-10 ASSESSMENT — DUKE ACTIVITY SCORE INDEX (DASI)
CAN YOU PARTICIPATE IN MODERATE RECREATIONAL ACTIVITIES LIKE GOLF, BOWLING, DANCING, DOUBLES TENNIS OR THROWING A BASEBALL OR FOOTBALL: YES
CAN YOU DO HEAVY WORK AROUND THE HOUSE LIKE SCRUBBING FLOORS OR LIFTING AND MOVING HEAVY FURNITURE: YES
CAN YOU DO YARD WORK LIKE RAKING LEAVES, WEEDING OR PUSHING A MOWER: YES
CAN YOU TAKE CARE OF YOURSELF (EAT, DRESS, BATHE, OR USE TOILET): YES
CAN YOU PARTICIPATE IN STRENOUS SPORTS LIKE SWIMMING, SINGLES TENNIS, FOOTBALL, BASKETBALL, OR SKIING: YES
CAN YOU CLIMB A FLIGHT OF STAIRS OR WALK UP A HILL: YES
CAN YOU HAVE SEXUAL RELATIONS: YES
CAN YOU DO MODERATE WORK AROUND THE HOUSE LIKE VACUUMING, SWEEPING FLOORS OR CARRYING GROCERIES: YES
CAN YOU RUN A SHORT DISTANCE: YES
CAN YOU WALK A BLOCK OR TWO ON LEVEL GROUND: YES
CAN YOU WALK INDOORS, SUCH AS AROUND YOUR HOUSE: YES
TOTAL_SCORE: 58.2
CAN YOU DO LIGHT WORK AROUND THE HOUSE LIKE DUSTING OR WASHING DISHES: YES
DASI METS SCORE: 9.9

## 2024-09-10 ASSESSMENT — ENCOUNTER SYMPTOMS
RESPIRATORY NEGATIVE: 1
NECK NEGATIVE: 1
MUSCULOSKELETAL NEGATIVE: 1
NEUROLOGICAL NEGATIVE: 1
ROS GI COMMENTS: COLOSTOMY
CONSTITUTIONAL NEGATIVE: 1
ROS SKIN COMMENTS: OSTOMY
CARDIOVASCULAR NEGATIVE: 1

## 2024-09-10 ASSESSMENT — PAIN SCALES - GENERAL: PAINLEVEL_OUTOF10: 2

## 2024-09-10 ASSESSMENT — LIFESTYLE VARIABLES: SMOKING_STATUS: SMOKER

## 2024-09-10 ASSESSMENT — PAIN - FUNCTIONAL ASSESSMENT
PAIN_FUNCTIONAL_ASSESSMENT: 0-10
PAIN_FUNCTIONAL_ASSESSMENT: 0-10

## 2024-09-10 ASSESSMENT — ACTIVITIES OF DAILY LIVING (ADL): ADL_SCORE: 0

## 2024-09-10 NOTE — PREPROCEDURE INSTRUCTIONS
Medication List            Accurate as of September 10, 2024  8:41 AM. Always use your most recent med list.                aspirin 81 mg EC tablet  Medication Adjustments for Surgery: Take/Use as prescribed     ezetimibe 10 mg tablet  Commonly known as: Zetia  Take 1 tablet (10 mg) by mouth once daily.  Additional Medication Adjustments for Surgery: Other (Comment)  Notes to patient: HOLD 24 hours before the day of surgery and HOLD the day of surgery     metoprolol succinate XL 25 mg 24 hr tablet  Commonly known as: Toprol-XL  Take 1 tablet (25 mg) by mouth once daily.  Medication Adjustments for Surgery: Take/Use as prescribed     nitroglycerin 0.4 mg SL tablet  Commonly known as: Nitrostat     polyethylene glycol 17 gram/dose powder  Commonly known as: Glycolax, Miralax  Take 17 g by mouth once daily. Do not fill before July 2, 2024.  Additional Medication Adjustments for Surgery: Other (Comment)  Notes to patient: Per Dr. Luke instructions     rosuvastatin 20 mg tablet  Commonly known as: Crestor  Take 1 tablet (20 mg) by mouth once daily.  Medication Adjustments for Surgery: Take/Use as prescribed                                  Thank you for visiting [facility]) today for your pre-procedure evaluation, you were seen by     ANUSHKA Campoverde  Pre Admission Testing  [facility]  [phone]    This summary includes instructions and information to aid you during your perioperative period.  Please read carefully. If you have any questions about your visit today, please call the number listed above.  If you become ill or have any changes to your health before your surgery, please contact your primary care provider and alert your surgeon.      Preparing for your Surgery       Exercises  Preoperative Deep Breathing Exercises  Why it is important to do deep breathing exercises before my surgery?  Deep breathing exercises strengthen your breathing muscles.  This helps you  to recover after your surgery and decreases the chance of breathing complications.  How are the deep breathing exercises done?  Sit straight with your back supported.  Breathe in deeply and slowly through your nose. Your lower rib cage should expand and your abdomen may move forward.  Hold that breath for 3 to 5 seconds.  Breathe out through pursed lips, slowly and completely.  Rest and repeat 10 times every hour while awake.  Rest longer if you become dizzy or lightheaded.       Preoperative Brain Exercises    What are brain exercises?  A brain exercise is any activity that engages your thinking (cognitive) skills.    What types of activities are considered brain exercises?  Jigsaw puzzles, crossword puzzles, word jumble, memory games, word search, and many more.  Many can be found free online or on your phone via a mobile tameka.    Why should I do brain exercises before my surgery?  More recent research has shown brain exercise before surgery can lower the risk of postoperative delirium (confusion) which can be especially important for older adults.  Patients who did brain exercises for 5 to 10 hours the days before surgery, cut their risk of postoperative delirium in half up to 1 week after surgery.    Sit-to-Stand Exercise    What is the sit-to-stand exercise?  The sit-to-stand exercise strengthens the muscles of your lower body and muscles in the center of your body (core muscles for stability) helping to maintain and improve your strength and mobility.  How do I do the sit-to-stand exercise?  The goal is to do this exercise without using your arms or hands.  If this is too difficult, use your arms and hands or a chair with armrests to help slowly push yourself to the standing position and lower yourself back to the sitting position. As the movement becomes easier use your arms and hands less.    Steps to the sit-to-stand exercise  Sit up tall in a sturdy chair, knees bent, feet flat on the floor shoulder-width  apart.  Shift your hips/pelvis forward in the chair to correctly position yourself for the next movement.  Lean forward at your hips.  Stand up straight putting equal weight on both feet.  Check to be sure you are properly aligned with the chair, in a slow controlled movement sit back down.  Repeat this exercise 10-15 times.  If needed you can do it fewer times until your strength improves.  Rest for 1 minute.  Do another 10-15 sit-to-stand exercises.  Try to do this in the morning and evening.        Instructions    Preoperative Fasting Guidelines    Why must I stop eating and drinking near surgery time?  With sedation, food or liquid in your stomach can enter your lungs causing serious complications  Food can increase nausea and vomiting  When do I need to stop eating and drinking before my surgery?      Do not eat any food or drink any liquids after midnight the night before your surgery/procedure.  You may have sips of water to take medications.            Simple things you can do to help prevent blood clots     Blood clots are blockages that can form in the body's veins. When a blood clot forms in your deep veins, it may be called a deep vein thrombosis, or DVT for short. Blood clots can happen in any part of the body where blood flows, but they are most common in the arms and legs. If a piece of a blood clot breaks free and travels to the lungs, it is called a pulmonary embolus (PE). A PE can be a very serious problem.         Being in the hospital or having surgery can raise your chances of getting a blood clot because you may not be well enough to move around as much as you normally do.         Ways you can help prevent blood clots in the hospital       Wearing SCDs  SCDs stands for Sequential Compression Devices.   SCDs are special sleeves that wrap around your legs. They attach to a pump that fills them with air to gently squeeze your legs every few minutes.  This helps return the blood in your legs to  your heart.   SCDs should only be taken off when walking or bathing. SCDs may not be comfortable, but they can help save your life.              Pump SCD leg sleeves  Wearing compression stockings - if your doctor orders them. These special snug-fitting stockings gently squeeze your legs to help blood flow.       Walking. Walking helps move the blood in your legs.   If your doctor says it is ok, try walking the halls at least   5 times a day. Ask us to help you get up, so you don't fall.      Taking any blood-thinning medicines your doctor orders.              Ways you can help prevent blood clots at home         Wearing compression stockings - if your doctor orders them.   Walking - to help move the blood in your legs.    Taking any blood-thinning medicines your doctor orders.      Signs of a blood clot or PE    Tell your doctor or nurse right away if you have any of the problems listed below.         If you are at home, seek medical care right away. Call 911 for chest pain or problems breathing.            Signs of a blood clot (DVT) - such as pain, swelling, redness, or warmth in your arm or legs.  Signs of a pulmonary embolism (PE) - such as chest pain or feeling short of breath      Tobacco and Alcohol;  Do not drink alcohol or smoke within 24 hours of surgery.  It is best to quit smoking for as long as possible before any surgery or procedure.    Quitting Smoking; Recognizing Dangerous Situations:  1-Alcohol use during the first month after quitting, 2-Being around smoke or someone who smokes 3-Times situation routinely smoked  4-Triggers-car, breaks, coffee, when awakening, social events  Coping Skills: 1-Learning new ways to manage stress 2-Exercising 3-Relaxation breathing   4-Change routines 5-Distraction techniques    Websites:  Smoke-Free - offers free text messages and an tameka to help you quit. Info includes eating and mood issues that may come with quitting. There is a Live Helpline to talk to an  expert. Go to smokefree.gov; Become an Ex-Smoker - the free EX Plan is based on scientific research and useful advice from ex-smokers. It isn't just about quitting smoking.  It's about re-learning life without cigarettes using a 3-step program.  Go to becomeanex.org   Centers for Disease Control offer many suggestions for helping you quit. Includes a Quit Guide and real-life stories. There are sections for specific groups such as LGBT, , different ethnic groups, and pregnant women.  Go to cdc.gov/tobacco/campaign/tips    Other Resources:  [place] Tobacco Quit Line - call 1-800-QUIT-NOW or 2-251-888-0002.  [employer] [facility] 2-1-1 - to find local programs and resources. Call 211 or go to Fastclick.  [facility] Tobacco Cessation Program - call 209-648-9317.  American Lung Association offers classes for quitting smoking. Some places may charge a fee. For a list of classes, go to lung.org or call 1-800LUNGDasdak.     Some things to think about:; The health benefits of quitting smoking can help most of the major parts of your body. There is no safe amount of cigarette smoke. Quitting smoking can add years to your life. When you quit, you'll also protect your loved ones from dangerous secondhand smoke. Make a plan, join a support group, and talk to your physician to assist in quitting smoking.                                                                                                              , Quitting Alcohol; Things to think about: Alcohol use disorder is a health condition that can improve with treatment. Each person is unique. A treatment that is good for one person may not be a good fit for someone else. Simply knowing the different options can be an important first step. Treatment can be inpatient, where you stay at a facility, or outpatient, where you stay at home. Your insurance plan may cover some treatment costs.   Resources:    NIAAA Alcohol Treatment Navigator - from the National [place] on  Alcohol Abuse and  Alcoholism. Offers an online tool to help you find the right treatment for you - near you. Go to alcoholtreatment.niaaa.nih.gov.  [facility] National Hotline  - from the Substance Abuse and Mental Health Services Administration. A free, confidential 24-hour treatment referral and information service for anyone facing mental and/or substance use disorders. Go to findtreatment.gov or call 2-780-831-HELP (3278).    Alcoholics Anonymous (AA) - offers group meetings and a 12-step program to anyone who has a drinking problem. Go to aa.org or call 722-027-2889    [facility] 2-1-1 - to find local programs and resources. Call 211 or go to Sparktrend.org    Other people you can talk to about treatment options include your primary care doctor, health insurance plan, local health department, or employee assistance program. You can also ask to talk to a hospital .               The Week before Surgery        Seven days before Surgery  Check your PAT medication instructions  Do the exercises provided to you by PAT  Arrange for a responsible, adult licensed  to take you home after surgery and stay with you for 24 hours.  You will not be permitted to drive yourself home if you have received any anesthetic/sedation  Six days before surgery  Check your PAT medication instructions  Do the exercises provided to you by PAT  Start using Chlorhexidene (CHG) body wash if prescribed  Five days before surgery  Check your PAT medication instructions  Do the exercises provided to you by PAT  Continue to use CHG body wash if prescribed  Three days before surgery  Check your PAT medication instructions  Do the exercises provided to you by PAT  Continue to use CHG body wash if prescribed  Two days before surgery  Check your PAT medication instructions  Do the exercises provided to you by PAT  Continue to use CHG body wash if prescribed    The Day before Surgery       Check your PAT medication and all other PAT  instructions including when to stop eating and drinking  You will be called with your arrival time for surgery in the late afternoon.  If you do not receive a call please reach out to [facility]. [phone]  Do not smoke or drink 24 hours before surgery  Prepare items to bring with you to the hospital  Shower with your chlorhexidine wash if prescribed  Brush your teeth and use your chlorhexidine dental rinse if prescribed    The Day of Surgery       Check your PAT medication instructions  Ensure you follow the instructions for when to stop eating and drinking  Shower, if prescribed use CHG.  Do not apply any lotions, creams, moisturizers, perfume or deodorant  Brush your teeth and use your CHG dental rinse if prescribed  Wear loose comfortable clothing  Avoid make-up  Remove  jewelry and piercings, consider professional piercing removal with a plastic spacer if needed  Bring photo ID and Insurance card  Bring an accurate medication list that includes medication dose, frequency and allergies  Bring a copy of your advanced directives (will, health care power of )  Bring any devices and controllers as well as medical devices you have been provided with for surgery (CPAP, slings, braces, etc.)  Dentures, eyeglasses, and contacts will be removed before surgery, please bring cases for contacts or glasses

## 2024-09-10 NOTE — CPM/PAT H&P
CPM/PAT Evaluation       Name: Jatinder Keenan (Jatinder Keenan)  /Age: 1960/64 y.o.     In-Person       Chief Complaint: Evaluation prior to surgery    HPI    Past Medical History:   Diagnosis Date    Acute maxillary sinusitis, unspecified 2015    Acute maxillary sinusitis    Body mass index (BMI) 37.0-37.9, adult 2019    BMI 37.0-37.9, adult    Chest pain on breathing 09/10/2015    Chest pain on respiration    Contact dermatitis due to poison ivy 2024    COVID-19 virus infection 2023    Hyperlipidemia     Hypertension     Laceration of left elbow 2023    Lumbago with sciatica, left side 2017    Acute left-sided low back pain with left-sided sciatica    Muscle pain 2023    Myocardial infarction (Multi)     Other specified abnormal findings of blood chemistry     Abnormal liver function test    Personal history of diseases of the skin and subcutaneous tissue 2014    History of folliculitis    Personal history of other diseases of the digestive system 2017    History of lower gastrointestinal bleeding    Personal history of other diseases of the digestive system 2018    History of gastrointestinal hemorrhage    Personal history of other specified conditions 2014    History of headache    Personal history of other specified conditions 11/15/2018    History of epigastric pain    Personal history of pulmonary embolism 2022    History of pulmonary embolism    Polymyalgia rheumatica (Multi) 11/15/2018    Polymyalgia    Weakness 2018    Left-sided weakness    Wears glasses     Wears partial dentures     upper, lower bridge       Past Surgical History:   Procedure Laterality Date    COLONOSCOPY  2017    Complete Colonoscopy    CORONARY ANGIOPLASTY WITH STENT PLACEMENT      Cath Stent Placement, x2    ESOPHAGOGASTRODUODENOSCOPY  2017    Diagnostic Esophagogastroduodenoscopy    MR HEAD ANGIO WO IV CONTRAST  2018  "   MR HEAD ANGIO WO IV CONTRAST 4/30/2018 [facility]    MR NECK ANGIO WO IV CONTRAST  04/30/2018    MR NECK ANGIO WO IV CONTRAST 4/30/2018 [facility]    OTHER SURGICAL HISTORY      Spinal Diskectomy Lumbar       Patient  has no history on file for sexual activity.    Family History   Problem Relation Name Age of Onset    Diabetes Mother      Heart failure Father         Allergies   Allergen Reactions    Atorvastatin Unknown     He says \"it didn't agree with me\"       Prior to Admission medications    Medication Sig Start Date End Date Taking? Authorizing Provider   aspirin 81 mg EC tablet Take 1 tablet (81 mg) by mouth once daily. 9/28/13   Historical Provider, MD   ezetimibe (Zetia) 10 mg tablet Take 1 tablet (10 mg) by mouth once daily. 7/10/24 7/10/25  Raza Sharma DO   metoprolol succinate XL (Toprol-XL) 25 mg 24 hr tablet Take 1 tablet (25 mg) by mouth once daily. 7/10/24 7/10/25  Raza Sharma DO   nitroglycerin (Nitrostat) 0.4 mg SL tablet Place 1 tablet (0.4 mg) under the tongue every 5 minutes if needed. 4/12/13   Historical Provider, MD   polyethylene glycol (Glycolax, Miralax) 17 gram/dose powder Take 17 g by mouth once daily. Do not fill before July 2, 2024.  Patient not taking: Reported on 7/18/2024 7/2/24   Rosalio Dutta DO   rosuvastatin (Crestor) 20 mg tablet Take 1 tablet (20 mg) by mouth once daily. 7/10/24 7/10/25  Raza Sharma DO        PAT ROS:   Constitutional:   neg    Neuro/Psych:   neg    Eyes:    use of corrective lenses  Ears:   Nose:   Mouth:    dental issues  Throat:   neg    Neck:   neg    Cardio:   neg    Respiratory:   neg    Endocrine:   GI:    Colostomy  :   neg    Musculoskeletal:   neg    Hematologic:   neg    Skin:   Ostomy      Physical Exam  Vitals reviewed.   Constitutional:       Appearance: Normal appearance.   HENT:      Head: Normocephalic and atraumatic.      Nose: Nose normal.      Mouth/Throat:      Mouth: Mucous membranes are " moist.      Pharynx: Oropharynx is clear.   Neck:      Vascular: No carotid bruit.   Cardiovascular:      Rate and Rhythm: Normal rate and regular rhythm.      Pulses: Normal pulses.      Heart sounds: Normal heart sounds.   Pulmonary:      Effort: Pulmonary effort is normal.      Breath sounds: Normal breath sounds.   Abdominal:      Palpations: Abdomen is soft.      Comments: Colostomy intact   Musculoskeletal:         General: Normal range of motion.      Cervical back: Normal range of motion and neck supple.   Skin:     General: Skin is warm and dry.      Capillary Refill: Capillary refill takes less than 2 seconds.   Neurological:      General: No focal deficit present.      Mental Status: He is alert and oriented to person, place, and time.   Psychiatric:         Mood and Affect: Mood normal.         Behavior: Behavior normal.         Thought Content: Thought content normal.         Judgment: Judgment normal.          PAT AIRWAY:   Airway:     Mallampati::  II    TM distance::  >3 FB    Neck ROM::  Full   Lower removable bridge   upper dentures      Visit Vitals  BP (!) 151/94   Pulse 71   Temp 36.1 °C (97 °F) (Tympanic)   Resp 16       DASI Risk Score      Flowsheet Row Most Recent Value   DASI SCORE 58.2   METS Score (Will be calculated only when all the questions are answered) 9.9          Caprini DVT Assessment      Flowsheet Row Most Recent Value   DVT Score 14   Current Status COPD, Major surgery planned, lasting 2-3 hours   History Prior major surgery, Inflammatory bowel disease, COPD, SVT, DVT/PE, Acute myocardial infarction   Age 60-75 years   BMI 30 or less          Modified Frailty Index    No data to display       CHADS2 Stroke Risk  Current as of 33 minutes ago        N/A 3 to 100%: High Risk   2 to < 3%: Medium Risk   0 to < 2%: Low Risk     Last Change: N/A          This score determines the patient's risk of having a stroke if the patient has atrial fibrillation.        This score is not  applicable to this patient. Components are not calculated.          Revised Cardiac Risk Index      Flowsheet Row Most Recent Value   Revised Cardiac Risk Calculator 3          Apfel Simplified Score      Flowsheet Row Most Recent Value   Apfel Simplified Score Calculator 1          Risk Analysis Index Results This Encounter         9/10/2024  0811             PERES Cancer History: Patient does not indicate history of cancer    Total Risk Analysis Index Score Without Cancer: 23    Total Risk Analysis Index Score: 23          Stop Bang Score      Flowsheet Row Most Recent Value   Do you snore loudly? 0   Do you often feel tired or fatigued after your sleep? 0   Has anyone ever observed you stop breathing in your sleep? 0   Do you have or are you being treated for high blood pressure? 1   Recent BMI (Calculated) 26.2   Is BMI greater than 35 kg/m2? 0=No   Age older than 50 years old? 1=Yes   Is your neck circumference greater than 17 inches (Male) or 16 inches (Female)? 0   Gender - Male 1=Yes   STOP-BANG Total Score 3                Assessment & Plan:    64 y.o.  male  scheduled for CLOSURE COLOSTOMY CYSTOSCOPY WITH INSERTION URETERAL CATHETER on 9/24/24 with Zurdo Mari for  Colostomy status.  PMHX includes CAD s/p MI and PCI to RCA x 2 (2016), HTN, HLD, Hx PE, CVA, DM2, GERD. COPD.  PAT consulted for perioperative risk stratification and optimization    Neuro:  Hx CVA  Patient is at increased risk for perioperative CVA secondary to  previous CVA/TIA, HTN, DM, increased age    HEENT:  No HEENT diagnosis or significant findings on chart review or clinical presentation and evaluation. No further preoperative testing/intervention indicated at this time.    Cardiovascular:  CAD s/p MI and PCI to RCA x 2 (2016), HTN, HLD, Hx PE  On crestor, metoprolol, zetia  METS: 9.9  RCRI: 3 points, 15% risk for postoperative MACE   ROGELIO: 0.9% risk for 30 day postoperative MACE  EKG -   8/28/24  Normal Sinus Rhythm  Complete  Right bundle branch block  Left anterior fascicular block  Bifascicular block  Abnormal ECG  Rate 60    6/28/24  Sinus rhythm with short LA  Right bundle branch block  Abnormal ECG  When compared with ECG of 28-JUN-2024 14:21, (unconfirmed)  No significant change was found  Pulmonary:  Hx COPD  Stop Bang score is 3 placing patient at intermediate risk for ONESIMO  ARISCAT: 26-44 points, 13.3% risk of in-hospital postoperative pulmonary complication  PRODIGY: High risk for opioid induced respiratory depression  Smoking cessation discussed with patient, patient also provided cessation education/hotline handout, Pulmonary education discussed, patient also provided deep breathing exercises and educational handout      Urological/Renal  No diagnosis or significant findings on chart review or clinical presentation and evaluation.     Endocrine:  Hx DM2    Hematologic:  Antiplatelet management   On ASA 81mg  Anticoagulation management  The patient is not currently receiving anticoagulation therapy. Patient provided with DVT educational handout.    Caprini Score 14, patient at High risk for perioperative DVT.  Patient provided with VTE education/handout.    Gastrointestinal:   6/6/24 diverticulitis with perforation/abscess  Colostomy, Left inguinal hernia, GERD    7/12/24 CT abdomen/pelvis    FINDINGS:  LOWER CHEST:  Bibasilar irregular predominantly dependent parenchymal opacities  more prominent from prior may represent a combination of atelectasis,  infiltrates and scarring.      ABDOMEN:      LIVER:  There are few small low-attenuation cysts of the liver similar to  prior.      BILE DUCTS:  Nondilated.      GALLBLADDER:  The gallbladder is not distended and without calcified stones.      PANCREAS:  Within normal limits.      SPLEEN:  Within normal limits.      ADRENAL GLANDS:  Within normal limits.      KIDNEYS AND URETERS:  The kidneys enhance symmetrically without focal lesion.  No  hydroureteronephrosis bilaterally.       Urinary bladder is unremarkable. Prostate is enlarged similar to  prior.      VESSELS:  Irregular atherosclerotic calcifications again seen throughout the  aorta and branch vessels. No aortic aneurysm. IVC is unremarkable.      BOWEL/PERITONEUM:  There have been interval postoperative changes of left partial  colectomy with resection of the sigmoid colon, left lower quadrant  end colostomy as well as a rectal stump.      Free intraperitoneal air is most prominent anteriorly in the upper  abdomen.      No bowel obstruction. Appendix is not clearly visualized although no  regional inflammation is seen.      Few scattered small colonic diverticula are present without acute  diverticulitis.      Mild generalized mesenteric edema is present. Trace intraperitoneal  free fluid is seen. No organized fluid collection.      RETROPERITONEUM/LYMPH NODES:  No ascites or free air, no fluid collection.      No retroperitoneal fluid collection or lymphadenopathy.      ABDOMINAL WALL:  There is a small elongated left inguinal hernia which contains fat  and small volume fluid. There is increased inflammatory fat stranding  of the adjacent fat within the left inguinal canal region.      There is an anterior abdominal midline wound with skin staples. Small  amount of gas is seen within the anterior subcutaneous tissues near  this wound which may be related to the prior surgery.      BONE AND SOFT TISSUE:  Multilevel disc space narrowing and endplate spurring is present  throughout the visualized spine along with small vacuum discs at  multiple levels. Facet arthrosis is greatest in the lower lumbar  spine. There is joint space narrowing and marginal spurring of both  hips.      IMPRESSION:  Interval postoperative changes of partial left colectomy, left lower  quadrant end colostomy as well as rectal stump. Intraperitoneal free  air most localized in the upper abdomen anteriorly may be related to  the surgery. No bowel obstruction.       Small elongated left inguinal hernia which contains fat and small  volume fluid. Increased inflammatory fat stranding adjacent to this  hernia.      Enlarged prostate.      Increased irregular predominantly dependent opacities of the lung  bases may represent a combination of infiltrates and atelectasis/scar.              Eat-10 score 0      Infectious disease:   No infectious diagnosis or significant findings on chart review or clinical presentation and evaluation.     Musculoskeletal:   1984: LAMINECTOMY W/O FFD 1/2 VERT SEG LUMBAR    Anesthesia/Airway:  No anesthesia complications      Medication instructions and NPO guidelines reviewed with the patient.  All questions or concerns discussed and addressed.      Labs ordered   CBC  CMP  T&S

## 2024-09-12 ENCOUNTER — APPOINTMENT (OUTPATIENT)
Dept: SURGERY | Facility: CLINIC | Age: 64
End: 2024-09-12
Payer: COMMERCIAL

## 2024-09-12 VITALS
HEIGHT: 67 IN | BODY MASS INDEX: 27.31 KG/M2 | WEIGHT: 174 LBS | DIASTOLIC BLOOD PRESSURE: 85 MMHG | TEMPERATURE: 97.1 F | OXYGEN SATURATION: 95 % | SYSTOLIC BLOOD PRESSURE: 136 MMHG | HEART RATE: 59 BPM

## 2024-09-12 DIAGNOSIS — K57.20 DIVERTICULITIS OF LARGE INTESTINE WITH ABSCESS WITHOUT BLEEDING: Primary | ICD-10-CM

## 2024-09-12 PROCEDURE — 3008F BODY MASS INDEX DOCD: CPT | Performed by: SURGERY

## 2024-09-12 PROCEDURE — 3079F DIAST BP 80-89 MM HG: CPT | Performed by: SURGERY

## 2024-09-12 PROCEDURE — 3075F SYST BP GE 130 - 139MM HG: CPT | Performed by: SURGERY

## 2024-09-12 PROCEDURE — 1159F MED LIST DOCD IN RCRD: CPT | Performed by: SURGERY

## 2024-09-12 PROCEDURE — 3048F LDL-C <100 MG/DL: CPT | Performed by: SURGERY

## 2024-09-12 PROCEDURE — 1123F ACP DISCUSS/DSCN MKR DOCD: CPT | Performed by: SURGERY

## 2024-09-12 PROCEDURE — 99024 POSTOP FOLLOW-UP VISIT: CPT | Performed by: SURGERY

## 2024-09-12 NOTE — H&P (VIEW-ONLY)
Subjective   Patient ID: Jatinder Keenan is a 64 y.o. male who presents for Follow-up.  HPI  This is a very pleasant gentleman here today to discuss a little further his upcoming surgery.  He seems to be in good spirits now and is in a much better frame of mind than he was a few weeks ago.  Again he is very eager to have the colostomy reversed.  I asked him about his smoking which he was hoping to quit by now and he states he still has an occasional cigarette particularly in the morning.  He understands that this can affect healing.  Review of Systems  10 point review is negative the patient did see his cardiologist and received a clean bill of health.  Objective   Physical Exam  Head is normocephalic atraumatic eyes extraocular movements are intact pupils are equal and round lungs are clear bilaterally heart is regular rate and rhythm  Assessment/Plan the patient will first need a colonoscopy and we plan for that the day before the colostomy reversal.  We discussed the risks of that procedure including bleeding perforation and missed lesion and he agrees to proceed we then discussed the colostomy closure the key points of that procedure and of course the all importance of the anastomosis.  I also discussed with him the leak test that we perform and our hopes that that technically goes off smoothly and he hopefully will not require a diverting loop ileostomy.  He stated an understanding of the above including his postoperative recovery and hospital stay and agrees to the procedures           Pallavi Nunn MD 09/12/24 12:51 PM

## 2024-09-18 ENCOUNTER — ANESTHESIA EVENT (OUTPATIENT)
Dept: OPERATING ROOM | Facility: HOSPITAL | Age: 64
End: 2024-09-18
Payer: COMMERCIAL

## 2024-09-19 ENCOUNTER — ANESTHESIA EVENT (OUTPATIENT)
Dept: OPERATING ROOM | Facility: HOSPITAL | Age: 64
End: 2024-09-19
Payer: COMMERCIAL

## 2024-09-19 ENCOUNTER — TELEPHONE (OUTPATIENT)
Dept: PREADMISSION TESTING | Facility: HOSPITAL | Age: 64
End: 2024-09-19
Payer: COMMERCIAL

## 2024-09-19 RX ORDER — ONDANSETRON HYDROCHLORIDE 2 MG/ML
4 INJECTION, SOLUTION INTRAVENOUS ONCE AS NEEDED
Status: CANCELLED | OUTPATIENT
Start: 2024-09-19

## 2024-09-19 RX ORDER — DROPERIDOL 2.5 MG/ML
0.62 INJECTION, SOLUTION INTRAMUSCULAR; INTRAVENOUS ONCE AS NEEDED
Status: CANCELLED | OUTPATIENT
Start: 2024-09-19

## 2024-09-19 RX ORDER — SODIUM CHLORIDE, SODIUM LACTATE, POTASSIUM CHLORIDE, CALCIUM CHLORIDE 600; 310; 30; 20 MG/100ML; MG/100ML; MG/100ML; MG/100ML
100 INJECTION, SOLUTION INTRAVENOUS CONTINUOUS
Status: CANCELLED | OUTPATIENT
Start: 2024-09-19

## 2024-09-19 NOTE — TELEPHONE ENCOUNTER
SURGERY PRE-OPERATIVE INSTRUCTIONS    *You will receive a phone call the day before your procedure  after 2pm, (or the Friday before your surgery if scheduled on a Monday.) Generally the hospital will be calling you with this information after that time.    *You are not to eat after midnight the night before the surgery. You may have 8oz of a clear liquid up until 2 hours prior to arriving to the hospital. The exception is with medications you were instructed to take day of surgery.    *You may take tylenol for pain/discomfort as needed.     *Stop taking all aspirin products, ibuprofen (motrin/advil), naproxen (aleve/naprosyn) for one week prior to surgery.    *Stop taking all vitamins and supplements one week prior to surgery.     *You should not have alcoholic beverages for 24 hours before surgery.     *You should not smoke 24 hours prior to surgery.     *To help prevent surgical infections bathe/shower with Dial soap the evening before surgery.    *You can wear deodorant but no lotion, powder, or perfume/cologne. You should remove all make-up and nail polish at home.    *If you wear glasses, please bring a case for the glasses with you.    *You will be asked to remove dentures and contacts.     *Please leave all valuables at home.    *You should wear loose, comfortable clothing that will accommodate bandages and/or casts.    *You should notify your doctor of any change in your condition (fever, cold, rash, etc). Surgery may need to be re-scheduled until a time you are in better health.    *A responsible adult is required to accompany you to and from the hospital if you are receiving anesthesia or a sedative. Patients are not permitted to drive for 24 hours after anesthesia.     *You can use the [facility] parking if you wish.     *If you have any further questions please call [phone]. Pt. To take am meds with a sip of water.  LT

## 2024-09-23 ENCOUNTER — ANESTHESIA (OUTPATIENT)
Dept: OPERATING ROOM | Facility: HOSPITAL | Age: 64
End: 2024-09-23
Payer: COMMERCIAL

## 2024-09-23 ENCOUNTER — HOSPITAL ENCOUNTER (OUTPATIENT)
Dept: OPERATING ROOM | Facility: HOSPITAL | Age: 64
Setting detail: OUTPATIENT SURGERY
Discharge: HOME | End: 2024-09-23
Payer: COMMERCIAL

## 2024-09-23 VITALS
BODY MASS INDEX: 26.4 KG/M2 | HEIGHT: 67 IN | HEART RATE: 67 BPM | WEIGHT: 168.21 LBS | OXYGEN SATURATION: 97 % | DIASTOLIC BLOOD PRESSURE: 64 MMHG | RESPIRATION RATE: 15 BRPM | SYSTOLIC BLOOD PRESSURE: 107 MMHG | TEMPERATURE: 96.8 F

## 2024-09-23 DIAGNOSIS — Z86.010 PERSONAL HISTORY OF COLONIC POLYPS: ICD-10-CM

## 2024-09-23 PROCEDURE — 3700000001 HC GENERAL ANESTHESIA TIME - INITIAL BASE CHARGE: Performed by: ANESTHESIOLOGY

## 2024-09-23 PROCEDURE — 2500000004 HC RX 250 GENERAL PHARMACY W/ HCPCS (ALT 636 FOR OP/ED): Performed by: REGISTERED NURSE

## 2024-09-23 PROCEDURE — 2500000004 HC RX 250 GENERAL PHARMACY W/ HCPCS (ALT 636 FOR OP/ED): Performed by: ANESTHESIOLOGY

## 2024-09-23 PROCEDURE — 44388 COLONOSCOPY THRU STOMA SPX: CPT | Performed by: SURGERY

## 2024-09-23 PROCEDURE — 3600000002 HC OR TIME - INITIAL BASE CHARGE - PROCEDURE LEVEL TWO: Performed by: ANESTHESIOLOGY

## 2024-09-23 PROCEDURE — 7100000009 HC PHASE TWO TIME - INITIAL BASE CHARGE: Performed by: ANESTHESIOLOGY

## 2024-09-23 PROCEDURE — A44388 PR COLONOSCOPY THRU STOMA: Performed by: REGISTERED NURSE

## 2024-09-23 PROCEDURE — 2720000007 HC OR 272 NO HCPCS: Performed by: ANESTHESIOLOGY

## 2024-09-23 PROCEDURE — 3600000007 HC OR TIME - EACH INCREMENTAL 1 MINUTE - PROCEDURE LEVEL TWO: Performed by: ANESTHESIOLOGY

## 2024-09-23 PROCEDURE — A44388 PR COLONOSCOPY THRU STOMA: Performed by: ANESTHESIOLOGY

## 2024-09-23 PROCEDURE — 0DBH8ZX EXCISION OF CECUM, VIA NATURAL OR ARTIFICIAL OPENING ENDOSCOPIC, DIAGNOSTIC: ICD-10-PCS | Performed by: SURGERY

## 2024-09-23 PROCEDURE — 7100000010 HC PHASE TWO TIME - EACH INCREMENTAL 1 MINUTE: Performed by: ANESTHESIOLOGY

## 2024-09-23 PROCEDURE — 3700000002 HC GENERAL ANESTHESIA TIME - EACH INCREMENTAL 1 MINUTE: Performed by: ANESTHESIOLOGY

## 2024-09-23 RX ORDER — SODIUM CHLORIDE, SODIUM LACTATE, POTASSIUM CHLORIDE, CALCIUM CHLORIDE 600; 310; 30; 20 MG/100ML; MG/100ML; MG/100ML; MG/100ML
20 INJECTION, SOLUTION INTRAVENOUS CONTINUOUS
Status: DISCONTINUED | OUTPATIENT
Start: 2024-09-23 | End: 2024-09-24 | Stop reason: HOSPADM

## 2024-09-23 RX ORDER — MIDAZOLAM HYDROCHLORIDE 1 MG/ML
INJECTION INTRAMUSCULAR; INTRAVENOUS AS NEEDED
Status: DISCONTINUED | OUTPATIENT
Start: 2024-09-23 | End: 2024-09-23

## 2024-09-23 RX ORDER — PROPOFOL 10 MG/ML
INJECTION, EMULSION INTRAVENOUS AS NEEDED
Status: DISCONTINUED | OUTPATIENT
Start: 2024-09-23 | End: 2024-09-23

## 2024-09-23 SDOH — HEALTH STABILITY: MENTAL HEALTH: CURRENT SMOKER: 1

## 2024-09-23 ASSESSMENT — PAIN - FUNCTIONAL ASSESSMENT: PAIN_FUNCTIONAL_ASSESSMENT: 0-10

## 2024-09-23 ASSESSMENT — PAIN SCALES - GENERAL
PAINLEVEL_OUTOF10: 0 - NO PAIN
PAINLEVEL_OUTOF10: 0 - NO PAIN

## 2024-09-23 NOTE — DISCHARGE INSTRUCTIONS
Patient Instructions after a Colonoscopy      The anesthetics, sedatives or narcotics which were given to you today will be acting in your body for the next 24 hours, so you might feel a little sleepy or groggy.  This feeling should slowly wear off. Carefully read and follow the instructions.     You received sedation today:  - Do not drive or operate any machinery or power tools of any kind.   - No alcoholic beverages today, not even beer or wine.  - Do not make any important decisions or sign any legal documents.  - No over the counter medications that contain alcohol or that may cause drowsiness.  - Do not make any important decisions or sign any legal documents.  - Make sure you have someone with you for first 24 hours.    While it is common to experience mild to moderate abdominal distention, gas, or belching after your procedure, if any of these symptoms occur following discharge from the GI Lab or within one week of having your procedure, call the [facility] to be advised whether a visit to your nearest Urgent Care or Emergency Department is indicated.  Take this paper with you if you go.     - If you develop an allergic reaction to the medications that were given during your procedure such as difficulty breathing, rash, hives, severe nausea, vomiting or lightheadedness.  - If you experience chest pain, shortness of breath, severe abdominal pain, fevers and chills.  -If you develop signs and symptoms of bleeding such as blood in your spit, if your stools turn black, tarry, or bloody  - If you have not urinated within 8 hours following your procedure.  - If your IV site becomes painful, red, inflamed, or looks infected.    If you received a biopsy/polypectomy/sphincterotomy the following instructions apply below:    __ Do not use Aspirin containing products, non-steroidal medications or anti-coagulants for one week following your procedure. (Examples of these types of medications are: Advil,  Arthrotec, Aleve, Coumadin, Ecotrin, Heparin, Ibuprofen, Indocin, Motrin, Naprosyn, Nuprin, Plavix, Vioxx, and Voltarin, or their generic forms.  This list is not all-inclusive.  Check with your physician or pharmacist before resuming medications.)   __ Eat a soft diet today.  Avoid foods that are poorly digested for the next 24 hours.  These foods would include: nuts, beans, lettuce, red meats, and fried foods. Start with liquids and advance your diet as tolerated, gradually work up to eating solids.   __ Do not have a Barium Study or Enema for one week.    Your physician recommends the additional following instructions:    -You have a contact number available for emergencies. The signs and symptoms of potential delayed complications were discussed with you. You may return to normal activities tomorrow.  -Resume your previous diet.  -Continue your present medications.   -We are waiting for your pathology results.  -Your physician has recommended a repeat colonoscopy (date to be determined after pending pathology results are reviewed) for surveillance based on pathology results.  -The findings and recommendations have been discussed with you.  -The findings and recommendations were discussed with your family.  - Please see Medication Reconciliation Form for new medication/medications prescribed.       If you experience any problems or have any questions following discharge from the GI Lab, please call:        Nurse Signature                                                                        Date___________________                                                                            Patient/Responsible Party Signature                                        Date___________________

## 2024-09-23 NOTE — ANESTHESIA PREPROCEDURE EVALUATION
Patient: Jatinder Keenan    Procedure Information       Anesthesia Start Date/Time: 09/23/24 0727    Scheduled providers: Pallavi Nunn MD; Tyrone Vang MD    Procedure: COLONOSCOPY    Location: [facility] OR            Relevant Problems   Anesthesia (within normal limits)      Cardiac  RBBB   (+) Coronary artery disease   (+) Hyperlipidemia   (+) Hypertension      Pulmonary   (+) Chronic obstructive pulmonary disease (Multi)   (+) Chronic pulmonary embolism without acute cor pulmonale, unspecified pulmonary embolism type (Multi)      Neuro   (+) Transient ischemic attack (TIA)      GI   (+) GERD (gastroesophageal reflux disease)      Endocrine   (+) Diabetes mellitus, type 2 (Multi)      Musculoskeletal   (+) Degeneration of lumbar or lumbosacral intervertebral disc      Digestive   (+) Abnormal liver function tests      Infectious/Inflammatory   (+) Diverticulitis of colon with perforation      Tobacco   (+) Nicotine dependence      Cardiac and Vasculature   (+) History of myocardial infarction       Clinical information reviewed:   Tobacco  Allergies  Meds  Problems  Med Hx  Surg Hx   Fam Hx  Soc   Hx        NPO Detail:  NPO/Void Status  Date of Last Liquid: 09/22/24  Time of Last Liquid: 2200         Physical Exam    Airway  Mallampati: II  TM distance: >3 FB  Neck ROM: full     Cardiovascular    Dental   (+) upper dentures, lower dentures  Comments: Lower partials     Pulmonary    Abdominal      Other findings: Colostomy          Anesthesia Plan    History of general anesthesia?: yes  History of complications of general anesthesia?: no    ASA 3     MAC     The patient is a current smoker.  Patient was previously instructed to abstain from smoking on day of procedure.  Patient did not smoke on day of procedure.    intravenous induction   Anesthetic plan and risks discussed with patient.    Plan discussed with attending.

## 2024-09-23 NOTE — ANESTHESIA POSTPROCEDURE EVALUATION
Patient: Jatinder Keenan    Procedure Summary       Date: 09/23/24 Room / Location: [facility] OR    Anesthesia Start: 0727 Anesthesia Stop: 0757    Procedure: COLONOSCOPY Diagnosis: Personal history of colonic polyps    Scheduled Providers: Pallavi Nunn MD; Tyrone Vang MD Responsible Provider: Tyrone Vang MD    Anesthesia Type: MAC ASA Status: 3            Anesthesia Type: MAC    Vitals Value Taken Time   /64 09/23/24 0806   Temp 36 °C (96.8 °F) 09/23/24 0751   Pulse 67 09/23/24 0751   Resp 15 09/23/24 0751   SpO2 97 % 09/23/24 0806       Anesthesia Post Evaluation    Patient location during evaluation: PACU  Patient participation: complete - patient participated  Level of consciousness: awake  Pain management: adequate  Multimodal analgesia pain management approach  Airway patency: patent  Two or more strategies used to mitigate risk of obstructive sleep apnea  Cardiovascular status: acceptable  Respiratory status: acceptable  Hydration status: acceptable  Postoperative Nausea and Vomiting: none        There were no known notable events for this encounter.

## 2024-09-24 ENCOUNTER — HOSPITAL ENCOUNTER (INPATIENT)
Facility: HOSPITAL | Age: 64
Discharge: HOME | DRG: 331 | End: 2024-09-24
Attending: SURGERY | Admitting: SURGERY
Payer: COMMERCIAL

## 2024-09-24 ENCOUNTER — ANESTHESIA (OUTPATIENT)
Dept: OPERATING ROOM | Facility: HOSPITAL | Age: 64
End: 2024-09-24
Payer: COMMERCIAL

## 2024-09-24 DIAGNOSIS — Z93.3 COLOSTOMY STATUS (MULTI): Primary | ICD-10-CM

## 2024-09-24 LAB
ABO GROUP (TYPE) IN BLOOD: NORMAL
GLUCOSE BLD MANUAL STRIP-MCNC: 101 MG/DL (ref 74–99)
GLUCOSE BLD MANUAL STRIP-MCNC: 115 MG/DL (ref 74–99)
RH FACTOR (ANTIGEN D): NORMAL

## 2024-09-24 PROCEDURE — 0752T DGTZ GLS MCRSCP SLD LVL III: CPT | Mod: TC,GEALAB | Performed by: SURGERY

## 2024-09-24 PROCEDURE — A44625 PR CLOSE ENTEROSTOMY,RESEC+ANAST: Performed by: ANESTHESIOLOGY

## 2024-09-24 PROCEDURE — 3700000002 HC GENERAL ANESTHESIA TIME - EACH INCREMENTAL 1 MINUTE: Performed by: SURGERY

## 2024-09-24 PROCEDURE — 88304 TISSUE EXAM BY PATHOLOGIST: CPT | Performed by: PATHOLOGY

## 2024-09-24 PROCEDURE — 3600000008 HC OR TIME - EACH INCREMENTAL 1 MINUTE - PROCEDURE LEVEL THREE: Performed by: SURGERY

## 2024-09-24 PROCEDURE — 82947 ASSAY GLUCOSE BLOOD QUANT: CPT

## 2024-09-24 PROCEDURE — 2500000005 HC RX 250 GENERAL PHARMACY W/O HCPCS

## 2024-09-24 PROCEDURE — 2500000004 HC RX 250 GENERAL PHARMACY W/ HCPCS (ALT 636 FOR OP/ED): Performed by: SURGERY

## 2024-09-24 PROCEDURE — 45341 SIGMOIDOSCOPY W/ULTRASOUND: CPT | Performed by: SURGERY

## 2024-09-24 PROCEDURE — 2500000004 HC RX 250 GENERAL PHARMACY W/ HCPCS (ALT 636 FOR OP/ED)

## 2024-09-24 PROCEDURE — 2720000007 HC OR 272 NO HCPCS: Performed by: SURGERY

## 2024-09-24 PROCEDURE — 96372 THER/PROPH/DIAG INJ SC/IM: CPT | Performed by: SURGERY

## 2024-09-24 PROCEDURE — A44625 PR CLOSE ENTEROSTOMY,RESEC+ANAST: Performed by: NURSE ANESTHETIST, CERTIFIED REGISTERED

## 2024-09-24 PROCEDURE — 44625 REPAIR BOWEL OPENING: CPT | Performed by: SURGERY

## 2024-09-24 PROCEDURE — 52005 CYSTO W/URTRL CATHJ: CPT | Performed by: UROLOGY

## 2024-09-24 PROCEDURE — 7100000002 HC RECOVERY ROOM TIME - EACH INCREMENTAL 1 MINUTE: Performed by: SURGERY

## 2024-09-24 PROCEDURE — 2500000004 HC RX 250 GENERAL PHARMACY W/ HCPCS (ALT 636 FOR OP/ED): Performed by: STUDENT IN AN ORGANIZED HEALTH CARE EDUCATION/TRAINING PROGRAM

## 2024-09-24 PROCEDURE — 3700000001 HC GENERAL ANESTHESIA TIME - INITIAL BASE CHARGE: Performed by: SURGERY

## 2024-09-24 PROCEDURE — 99222 1ST HOSP IP/OBS MODERATE 55: CPT | Performed by: STUDENT IN AN ORGANIZED HEALTH CARE EDUCATION/TRAINING PROGRAM

## 2024-09-24 PROCEDURE — 2500000004 HC RX 250 GENERAL PHARMACY W/ HCPCS (ALT 636 FOR OP/ED): Performed by: ANESTHESIOLOGY

## 2024-09-24 PROCEDURE — 36415 COLL VENOUS BLD VENIPUNCTURE: CPT | Performed by: SURGERY

## 2024-09-24 PROCEDURE — 7100000001 HC RECOVERY ROOM TIME - INITIAL BASE CHARGE: Performed by: SURGERY

## 2024-09-24 PROCEDURE — 0DTJ0ZZ RESECTION OF APPENDIX, OPEN APPROACH: ICD-10-PCS | Performed by: SURGERY

## 2024-09-24 PROCEDURE — 0DJD8ZZ INSPECTION OF LOWER INTESTINAL TRACT, VIA NATURAL OR ARTIFICIAL OPENING ENDOSCOPIC: ICD-10-PCS | Performed by: SURGERY

## 2024-09-24 PROCEDURE — 2500000004 HC RX 250 GENERAL PHARMACY W/ HCPCS (ALT 636 FOR OP/ED): Performed by: INTERNAL MEDICINE

## 2024-09-24 PROCEDURE — 3600000003 HC OR TIME - INITIAL BASE CHARGE - PROCEDURE LEVEL THREE: Performed by: SURGERY

## 2024-09-24 PROCEDURE — 0DQM0ZZ REPAIR DESCENDING COLON, OPEN APPROACH: ICD-10-PCS | Performed by: SURGERY

## 2024-09-24 PROCEDURE — 1200000002 HC GENERAL ROOM WITH TELEMETRY DAILY

## 2024-09-24 RX ORDER — METRONIDAZOLE 500 MG/100ML
500 INJECTION, SOLUTION INTRAVENOUS EVERY 8 HOURS
Status: COMPLETED | OUTPATIENT
Start: 2024-09-24 | End: 2024-09-25

## 2024-09-24 RX ORDER — SODIUM CHLORIDE, SODIUM LACTATE, POTASSIUM CHLORIDE, CALCIUM CHLORIDE 600; 310; 30; 20 MG/100ML; MG/100ML; MG/100ML; MG/100ML
100 INJECTION, SOLUTION INTRAVENOUS CONTINUOUS
Status: DISCONTINUED | OUTPATIENT
Start: 2024-09-24 | End: 2024-09-25

## 2024-09-24 RX ORDER — MORPHINE SULFATE 4 MG/ML
4 INJECTION INTRAVENOUS EVERY 4 HOURS PRN
Status: DISCONTINUED | OUTPATIENT
Start: 2024-09-24 | End: 2024-09-25

## 2024-09-24 RX ORDER — DROPERIDOL 2.5 MG/ML
0.62 INJECTION, SOLUTION INTRAMUSCULAR; INTRAVENOUS ONCE AS NEEDED
Status: DISCONTINUED | OUTPATIENT
Start: 2024-09-24 | End: 2024-09-24 | Stop reason: HOSPADM

## 2024-09-24 RX ORDER — HYDROMORPHONE HYDROCHLORIDE 1 MG/ML
1 INJECTION, SOLUTION INTRAMUSCULAR; INTRAVENOUS; SUBCUTANEOUS EVERY 4 HOURS PRN
Status: DISCONTINUED | OUTPATIENT
Start: 2024-09-24 | End: 2024-09-24

## 2024-09-24 RX ORDER — PANTOPRAZOLE SODIUM 40 MG/10ML
40 INJECTION, POWDER, LYOPHILIZED, FOR SOLUTION INTRAVENOUS
Status: DISCONTINUED | OUTPATIENT
Start: 2024-09-25 | End: 2024-09-28

## 2024-09-24 RX ORDER — ENOXAPARIN SODIUM 100 MG/ML
30 INJECTION SUBCUTANEOUS ONCE
Status: COMPLETED | OUTPATIENT
Start: 2024-09-24 | End: 2024-09-24

## 2024-09-24 RX ORDER — ONDANSETRON HYDROCHLORIDE 2 MG/ML
INJECTION, SOLUTION INTRAVENOUS AS NEEDED
Status: DISCONTINUED | OUTPATIENT
Start: 2024-09-24 | End: 2024-09-24

## 2024-09-24 RX ORDER — ONDANSETRON HYDROCHLORIDE 2 MG/ML
4 INJECTION, SOLUTION INTRAVENOUS EVERY 8 HOURS PRN
Status: DISCONTINUED | OUTPATIENT
Start: 2024-09-24 | End: 2024-09-30 | Stop reason: HOSPADM

## 2024-09-24 RX ORDER — CEFAZOLIN 1 G/1
INJECTION, POWDER, FOR SOLUTION INTRAVENOUS AS NEEDED
Status: DISCONTINUED | OUTPATIENT
Start: 2024-09-24 | End: 2024-09-24

## 2024-09-24 RX ORDER — PROPOFOL 10 MG/ML
INJECTION, EMULSION INTRAVENOUS AS NEEDED
Status: DISCONTINUED | OUTPATIENT
Start: 2024-09-24 | End: 2024-09-24

## 2024-09-24 RX ORDER — OXYCODONE HYDROCHLORIDE 5 MG/1
5 TABLET ORAL EVERY 4 HOURS PRN
Status: DISCONTINUED | OUTPATIENT
Start: 2024-09-24 | End: 2024-09-24 | Stop reason: HOSPADM

## 2024-09-24 RX ORDER — ALBUTEROL SULFATE 0.83 MG/ML
2.5 SOLUTION RESPIRATORY (INHALATION) ONCE AS NEEDED
Status: DISCONTINUED | OUTPATIENT
Start: 2024-09-24 | End: 2024-09-24 | Stop reason: HOSPADM

## 2024-09-24 RX ORDER — PANTOPRAZOLE SODIUM 40 MG/1
40 TABLET, DELAYED RELEASE ORAL
Status: DISCONTINUED | OUTPATIENT
Start: 2024-09-25 | End: 2024-09-30 | Stop reason: HOSPADM

## 2024-09-24 RX ORDER — SODIUM CHLORIDE, SODIUM LACTATE, POTASSIUM CHLORIDE, CALCIUM CHLORIDE 600; 310; 30; 20 MG/100ML; MG/100ML; MG/100ML; MG/100ML
100 INJECTION, SOLUTION INTRAVENOUS CONTINUOUS
Status: DISCONTINUED | OUTPATIENT
Start: 2024-09-24 | End: 2024-09-24 | Stop reason: HOSPADM

## 2024-09-24 RX ORDER — ONDANSETRON 4 MG/1
4 TABLET, FILM COATED ORAL EVERY 8 HOURS PRN
Status: DISCONTINUED | OUTPATIENT
Start: 2024-09-24 | End: 2024-09-30 | Stop reason: HOSPADM

## 2024-09-24 RX ORDER — MORPHINE SULFATE 2 MG/ML
2 INJECTION, SOLUTION INTRAMUSCULAR; INTRAVENOUS EVERY 4 HOURS PRN
Status: DISCONTINUED | OUTPATIENT
Start: 2024-09-24 | End: 2024-09-25

## 2024-09-24 RX ORDER — ONDANSETRON HYDROCHLORIDE 2 MG/ML
4 INJECTION, SOLUTION INTRAVENOUS ONCE AS NEEDED
Status: COMPLETED | OUTPATIENT
Start: 2024-09-24 | End: 2024-09-24

## 2024-09-24 RX ORDER — FENTANYL CITRATE 50 UG/ML
INJECTION, SOLUTION INTRAMUSCULAR; INTRAVENOUS AS NEEDED
Status: DISCONTINUED | OUTPATIENT
Start: 2024-09-24 | End: 2024-09-24

## 2024-09-24 RX ORDER — METRONIDAZOLE 500 MG/100ML
500 INJECTION, SOLUTION INTRAVENOUS EVERY 8 HOURS
Status: DISCONTINUED | OUTPATIENT
Start: 2024-09-24 | End: 2024-09-24

## 2024-09-24 RX ORDER — CEFAZOLIN SODIUM 1 G/50ML
1 SOLUTION INTRAVENOUS EVERY 8 HOURS
Status: COMPLETED | OUTPATIENT
Start: 2024-09-24 | End: 2024-09-25

## 2024-09-24 RX ORDER — BUPIVACAINE HYDROCHLORIDE 5 MG/ML
INJECTION, SOLUTION PERINEURAL AS NEEDED
Status: DISCONTINUED | OUTPATIENT
Start: 2024-09-24 | End: 2024-09-24 | Stop reason: HOSPADM

## 2024-09-24 RX ORDER — MIDAZOLAM HYDROCHLORIDE 1 MG/ML
INJECTION INTRAMUSCULAR; INTRAVENOUS AS NEEDED
Status: DISCONTINUED | OUTPATIENT
Start: 2024-09-24 | End: 2024-09-24

## 2024-09-24 RX ORDER — NORETHINDRONE AND ETHINYL ESTRADIOL 0.5-0.035
KIT ORAL AS NEEDED
Status: DISCONTINUED | OUTPATIENT
Start: 2024-09-24 | End: 2024-09-24

## 2024-09-24 RX ORDER — DIPHENHYDRAMINE HYDROCHLORIDE 50 MG/ML
12.5 INJECTION INTRAMUSCULAR; INTRAVENOUS ONCE AS NEEDED
Status: DISCONTINUED | OUTPATIENT
Start: 2024-09-24 | End: 2024-09-24 | Stop reason: HOSPADM

## 2024-09-24 RX ORDER — LIDOCAINE HYDROCHLORIDE 20 MG/ML
INJECTION, SOLUTION INFILTRATION; PERINEURAL AS NEEDED
Status: DISCONTINUED | OUTPATIENT
Start: 2024-09-24 | End: 2024-09-24

## 2024-09-24 RX ORDER — SODIUM CHLORIDE, SODIUM LACTATE, POTASSIUM CHLORIDE, CALCIUM CHLORIDE 600; 310; 30; 20 MG/100ML; MG/100ML; MG/100ML; MG/100ML
20 INJECTION, SOLUTION INTRAVENOUS CONTINUOUS
Status: DISCONTINUED | OUTPATIENT
Start: 2024-09-24 | End: 2024-09-25

## 2024-09-24 RX ORDER — MEPERIDINE HYDROCHLORIDE 25 MG/ML
12.5 INJECTION INTRAMUSCULAR; INTRAVENOUS; SUBCUTANEOUS EVERY 10 MIN PRN
Status: DISCONTINUED | OUTPATIENT
Start: 2024-09-24 | End: 2024-09-24 | Stop reason: HOSPADM

## 2024-09-24 RX ORDER — ONDANSETRON HYDROCHLORIDE 2 MG/ML
4 INJECTION, SOLUTION INTRAVENOUS ONCE AS NEEDED
Status: DISCONTINUED | OUTPATIENT
Start: 2024-09-24 | End: 2024-09-24 | Stop reason: HOSPADM

## 2024-09-24 RX ORDER — NALOXONE HYDROCHLORIDE 0.4 MG/ML
0.2 INJECTION, SOLUTION INTRAMUSCULAR; INTRAVENOUS; SUBCUTANEOUS EVERY 5 MIN PRN
Status: DISCONTINUED | OUTPATIENT
Start: 2024-09-24 | End: 2024-09-24

## 2024-09-24 RX ORDER — ROCURONIUM BROMIDE 10 MG/ML
INJECTION, SOLUTION INTRAVENOUS AS NEEDED
Status: DISCONTINUED | OUTPATIENT
Start: 2024-09-24 | End: 2024-09-24

## 2024-09-24 RX ORDER — HYDROMORPHONE HYDROCHLORIDE 2 MG/ML
INJECTION, SOLUTION INTRAMUSCULAR; INTRAVENOUS; SUBCUTANEOUS AS NEEDED
Status: DISCONTINUED | OUTPATIENT
Start: 2024-09-24 | End: 2024-09-24

## 2024-09-24 RX ADMIN — SODIUM CHLORIDE, POTASSIUM CHLORIDE, SODIUM LACTATE AND CALCIUM CHLORIDE 100 ML/HR: 600; 310; 30; 20 INJECTION, SOLUTION INTRAVENOUS at 23:58

## 2024-09-24 RX ADMIN — SODIUM CHLORIDE, POTASSIUM CHLORIDE, SODIUM LACTATE AND CALCIUM CHLORIDE 100 ML/HR: 600; 310; 30; 20 INJECTION, SOLUTION INTRAVENOUS at 12:28

## 2024-09-24 RX ADMIN — HYDROMORPHONE HYDROCHLORIDE 0.2 MG: 1 INJECTION, SOLUTION INTRAMUSCULAR; INTRAVENOUS; SUBCUTANEOUS at 22:37

## 2024-09-24 RX ADMIN — SODIUM CHLORIDE, POTASSIUM CHLORIDE, SODIUM LACTATE AND CALCIUM CHLORIDE 100 ML/HR: 600; 310; 30; 20 INJECTION, SOLUTION INTRAVENOUS at 14:27

## 2024-09-24 RX ADMIN — HYDROMORPHONE HYDROCHLORIDE 0.5 MG: 1 INJECTION, SOLUTION INTRAMUSCULAR; INTRAVENOUS; SUBCUTANEOUS at 13:11

## 2024-09-24 RX ADMIN — CEFAZOLIN SODIUM 1 G: 1 INJECTION, SOLUTION INTRAVENOUS at 15:58

## 2024-09-24 RX ADMIN — PROMETHAZINE HYDROCHLORIDE 6.25 MG: 25 INJECTION INTRAMUSCULAR; INTRAVENOUS at 13:31

## 2024-09-24 RX ADMIN — CEFAZOLIN SODIUM 1 G: 1 INJECTION, SOLUTION INTRAVENOUS at 23:51

## 2024-09-24 RX ADMIN — SODIUM CHLORIDE, POTASSIUM CHLORIDE, SODIUM LACTATE AND CALCIUM CHLORIDE 20 ML/HR: 600; 310; 30; 20 INJECTION, SOLUTION INTRAVENOUS at 07:02

## 2024-09-24 RX ADMIN — ONDANSETRON 4 MG: 2 INJECTION, SOLUTION INTRAMUSCULAR; INTRAVENOUS at 12:46

## 2024-09-24 RX ADMIN — HYDROMORPHONE HYDROCHLORIDE 0.5 MG: 1 INJECTION, SOLUTION INTRAMUSCULAR; INTRAVENOUS; SUBCUTANEOUS at 12:46

## 2024-09-24 RX ADMIN — METRONIDAZOLE 500 MG: 500 INJECTION, SOLUTION INTRAVENOUS at 17:00

## 2024-09-24 RX ADMIN — MORPHINE SULFATE 4 MG: 4 INJECTION INTRAVENOUS at 19:54

## 2024-09-24 SDOH — SOCIAL STABILITY: SOCIAL INSECURITY: ARE YOU OR HAVE YOU BEEN THREATENED OR ABUSED PHYSICALLY, EMOTIONALLY, OR SEXUALLY BY ANYONE?: NO

## 2024-09-24 SDOH — SOCIAL STABILITY: SOCIAL INSECURITY: DOES ANYONE TRY TO KEEP YOU FROM HAVING/CONTACTING OTHER FRIENDS OR DOING THINGS OUTSIDE YOUR HOME?: NO

## 2024-09-24 SDOH — SOCIAL STABILITY: SOCIAL INSECURITY: WERE YOU ABLE TO COMPLETE ALL THE BEHAVIORAL HEALTH SCREENINGS?: YES

## 2024-09-24 SDOH — SOCIAL STABILITY: SOCIAL INSECURITY: HAS ANYONE EVER THREATENED TO HURT YOUR FAMILY OR YOUR PETS?: NO

## 2024-09-24 SDOH — SOCIAL STABILITY: SOCIAL INSECURITY: DO YOU FEEL ANYONE HAS EXPLOITED OR TAKEN ADVANTAGE OF YOU FINANCIALLY OR OF YOUR PERSONAL PROPERTY?: NO

## 2024-09-24 SDOH — SOCIAL STABILITY: SOCIAL INSECURITY: DO YOU FEEL UNSAFE GOING BACK TO THE PLACE WHERE YOU ARE LIVING?: NO

## 2024-09-24 SDOH — SOCIAL STABILITY: SOCIAL INSECURITY: ARE THERE ANY APPARENT SIGNS OF INJURIES/BEHAVIORS THAT COULD BE RELATED TO ABUSE/NEGLECT?: NO

## 2024-09-24 SDOH — SOCIAL STABILITY: SOCIAL INSECURITY: HAVE YOU HAD THOUGHTS OF HARMING ANYONE ELSE?: NO

## 2024-09-24 SDOH — SOCIAL STABILITY: SOCIAL INSECURITY: HAVE YOU HAD ANY THOUGHTS OF HARMING ANYONE ELSE?: NO

## 2024-09-24 SDOH — HEALTH STABILITY: MENTAL HEALTH: CURRENT SMOKER: 1

## 2024-09-24 SDOH — SOCIAL STABILITY: SOCIAL INSECURITY: ABUSE: ADULT

## 2024-09-24 ASSESSMENT — ACTIVITIES OF DAILY LIVING (ADL)
PATIENT'S MEMORY ADEQUATE TO SAFELY COMPLETE DAILY ACTIVITIES?: YES
HEARING - RIGHT EAR: FUNCTIONAL
FEEDING YOURSELF: INDEPENDENT
DRESSING YOURSELF: NEEDS ASSISTANCE
ASSISTIVE_DEVICE: DENTURES LOWER;DENTURES UPPER;EYEGLASSES
BATHING: NEEDS ASSISTANCE
TOILETING: NEEDS ASSISTANCE
GROOMING: NEEDS ASSISTANCE
ADEQUATE_TO_COMPLETE_ADL: YES
JUDGMENT_ADEQUATE_SAFELY_COMPLETE_DAILY_ACTIVITIES: YES
WALKS IN HOME: NEEDS ASSISTANCE
HEARING - LEFT EAR: DIFFICULTY WITH NOISE
LACK_OF_TRANSPORTATION: NO

## 2024-09-24 ASSESSMENT — COGNITIVE AND FUNCTIONAL STATUS - GENERAL
MOVING TO AND FROM BED TO CHAIR: A LITTLE
HELP NEEDED FOR BATHING: A LITTLE
TOILETING: A LITTLE
DRESSING REGULAR UPPER BODY CLOTHING: A LITTLE
STANDING UP FROM CHAIR USING ARMS: A LITTLE
MOBILITY SCORE: 18
MOVING FROM LYING ON BACK TO SITTING ON SIDE OF FLAT BED WITH BEDRAILS: A LITTLE
PATIENT BASELINE BEDBOUND: NO
MOVING FROM LYING ON BACK TO SITTING ON SIDE OF FLAT BED WITH BEDRAILS: A LITTLE
STANDING UP FROM CHAIR USING ARMS: A LITTLE
CLIMB 3 TO 5 STEPS WITH RAILING: A LITTLE
WALKING IN HOSPITAL ROOM: A LITTLE
WALKING IN HOSPITAL ROOM: A LITTLE
MOBILITY SCORE: 18
MOVING TO AND FROM BED TO CHAIR: A LITTLE
DRESSING REGULAR LOWER BODY CLOTHING: A LITTLE
DAILY ACTIVITIY SCORE: 20
DRESSING REGULAR UPPER BODY CLOTHING: A LITTLE
TOILETING: A LITTLE
TURNING FROM BACK TO SIDE WHILE IN FLAT BAD: A LITTLE
TURNING FROM BACK TO SIDE WHILE IN FLAT BAD: A LITTLE
CLIMB 3 TO 5 STEPS WITH RAILING: A LITTLE
HELP NEEDED FOR BATHING: A LITTLE
DRESSING REGULAR LOWER BODY CLOTHING: A LITTLE
DAILY ACTIVITIY SCORE: 20

## 2024-09-24 ASSESSMENT — COLUMBIA-SUICIDE SEVERITY RATING SCALE - C-SSRS
2. HAVE YOU ACTUALLY HAD ANY THOUGHTS OF KILLING YOURSELF?: NO
1. IN THE PAST MONTH, HAVE YOU WISHED YOU WERE DEAD OR WISHED YOU COULD GO TO SLEEP AND NOT WAKE UP?: NO
6. HAVE YOU EVER DONE ANYTHING, STARTED TO DO ANYTHING, OR PREPARED TO DO ANYTHING TO END YOUR LIFE?: NO

## 2024-09-24 ASSESSMENT — PAIN SCALES - GENERAL
PAINLEVEL_OUTOF10: 8
PAINLEVEL_OUTOF10: 7
PAINLEVEL_OUTOF10: 8
PAINLEVEL_OUTOF10: 7
PAINLEVEL_OUTOF10: 8
PAINLEVEL_OUTOF10: 10 - WORST POSSIBLE PAIN
PAINLEVEL_OUTOF10: 10 - WORST POSSIBLE PAIN
PAIN_LEVEL: 2

## 2024-09-24 ASSESSMENT — PATIENT HEALTH QUESTIONNAIRE - PHQ9
2. FEELING DOWN, DEPRESSED OR HOPELESS: NOT AT ALL
SUM OF ALL RESPONSES TO PHQ9 QUESTIONS 1 & 2: 0
1. LITTLE INTEREST OR PLEASURE IN DOING THINGS: NOT AT ALL

## 2024-09-24 ASSESSMENT — LIFESTYLE VARIABLES
HOW OFTEN DO YOU HAVE 6 OR MORE DRINKS ON ONE OCCASION: NEVER
HOW MANY STANDARD DRINKS CONTAINING ALCOHOL DO YOU HAVE ON A TYPICAL DAY: PATIENT DOES NOT DRINK
AUDIT-C TOTAL SCORE: 0
SKIP TO QUESTIONS 9-10: 1
AUDIT-C TOTAL SCORE: 0
HOW OFTEN DO YOU HAVE A DRINK CONTAINING ALCOHOL: NEVER

## 2024-09-24 ASSESSMENT — PAIN - FUNCTIONAL ASSESSMENT
PAIN_FUNCTIONAL_ASSESSMENT: 0-10

## 2024-09-24 ASSESSMENT — PAIN DESCRIPTION - DESCRIPTORS
DESCRIPTORS: ACHING

## 2024-09-24 ASSESSMENT — PAIN DESCRIPTION - LOCATION
LOCATION: ABDOMEN
LOCATION: ABDOMEN

## 2024-09-24 NOTE — OP NOTE
CLOSURE COLOSTOMY Operative Note     Date: 2024  OR Location: [facility] OR    Name: Jatinder Keenan, : 1960, Age: 64 y.o., MRN: [id number], Sex: male    Diagnosis  Pre-op Diagnosis      * Colostomy status (Multi) [Z93.3] Post-op Diagnosis     * Colostomy status (Multi) [Z93.3]     Procedures  Flexible sigmoidoscopy       Surgeons   Arlin Spencer MD     Resident/Fellow/Other Assistant:  Surgeons and Role:  * No surgeons found with a matching role *    Procedure Summary  Anesthesia: General  ASA: III  Anesthesia Staff: Anesthesiologist: Hector Rodríguez MD  CRNA: МАРИНА Dacosta-CRNA  SRNA: Ravi Campos    Intra-op Medications:   Administrations occurring from 0735 to 1205 on 24:   Medication Name Total Dose   BUPivacaine HCl (Marcaine) 0.5 % (5 mg/mL) injection 20 mL   metroNIDAZOLE (Flagyl) 500 mg in sodium chloride (iso)  mL 500 mg   enoxaparin (Lovenox) syringe 30 mg 30 mg              Anesthesia Record               Intraprocedure I/O Totals          Intake    lactated Ringer's infusion 1200.00 mL    Total Intake 1200 mL       Output    Urine 45 mL    Total Output 45 mL       Net    Net Volume 1155 mL              Staff:   Relief Circulator: Altagracia  Circulator: Paola Sosa Person: Kwan Sosa Person: Kennedy Gaona Circulator: Ambar         Drains and/or Catheters:   Closed/Suction Drain Medial LUQ (Active)   Dressing Status Clean;Dry 24 1251   Status To bulb suction 24 1212       NG/OG/Feeding Tube OG - [place] sump 16 Fr Center mouth (Active)       NG/OG/Feeding Tube NG - [place] sump 16 Fr Left nostril (Active)   Tube Status Low intermittent suction 24 1300   Site Assessment Clean;Dry;Intact 24 1300   Tube Securement Taped to nostril center 24 1300       [REMOVED] Colostomy LUQ (Removed)       Findings: see below     Indications: Jatinder Keenan is an 64 y.o. male who is having surgery for Colostomy status (Multi) [Z93.3].       Procedure  Details:   Patient was scheduled for colostomy take down today with Dr Nunn. I was asked to help with EEA colorectal anastomosis part. Please see Dr Nunn note for detailed info. The anastomosis was done with no problem. After anastomosis, I performed flexible sigmoidoscopy. The anastomosis appeared intact and widely open. No air leak was noted. The anastomosis was about 15cm from the verge.         Attending Attestation: I was present and scrubbed for the entire procedure.    Arlin Spencer MD FACS

## 2024-09-24 NOTE — ANESTHESIA PROCEDURE NOTES
Airway  Date/Time: 9/24/2024 8:20 AM  Urgency: elective    Difficult airway    Staffing  Performed: SRNA   Authorized by: Hector Rodríguez MD    Performed by: Ravi Campos  Patient location during procedure: OR    Indications and Patient Condition  Indications for airway management: anesthesia and airway protection  Spontaneous ventilation: present  Sedation level: deep  Preoxygenated: yes  Patient position: sniffing  MILS maintained throughout  Mask difficulty assessment: 2 - vent by mask + OA or adjuvant +/- NMBA  Planned trial extubation    Final Airway Details  Final airway type: endotracheal airway      Successful airway: ETT     Successful intubation technique: direct laryngoscopy  Facilitating devices/methods: intubating stylet  Endotracheal tube insertion site: oral  Blade: Marcy  Blade size: #4  ETT size (mm): 7.5  Cormack-Lehane Classification: grade I - full view of glottis  Placement verified by: chest auscultation and capnometry   Measured from: gums  ETT to gums (cm): 23  Number of attempts at approach: 1

## 2024-09-24 NOTE — OP NOTE
CLOSURE COLOSTOMY Operative Note     Date: 2024  OR Location: [facility]    Name: Jatidner Keenan, : 1960, Age: 64 y.o., MRN: [id number], Sex: male    Diagnosis  Pre-op Diagnosis      * Colostomy status (Multi) [Z93.3] Post-op Diagnosis     * Colostomy status (Multi) [Z93.3]     Procedures  CLOSURE COLOSTOMY  30878 - [place] CLSR NTRSTM LG/SM RESCJ & ANAST OTH/THN CLRCT    CYSTOSCOPY WITH INSERTION URETERAL CATHETER  86133 - [place] CYSTO BLADDER W/URETERAL CATHETERIZATION  Appendectomy  Tap block    Surgeons   Panel 1:     * Pallavi Nunn - Primary  Panel 2:     * Shaquille Renner - Primary    Resident/Fellow/Other Assistant:  Surgeons and Role:  * No surgeons found with a matching role *    Procedure Summary  Anesthesia: General  ASA: III  Anesthesia Staff: Anesthesiologist: Hector Rodríguez MD  CRNA: МАРИНА Dacosta-CRNA  SRNA: Ravi Campos  Estimated Blood Loss: 20mL  Intra-op Medications:   Administrations occurring from 0735 to 1205 on 24:   Medication Name Total Dose   BUPivacaine HCl (Marcaine) 0.5 % (5 mg/mL) injection 20 mL   metroNIDAZOLE (Flagyl) 500 mg in sodium chloride (iso)  mL 500 mg   enoxaparin (Lovenox) syringe 30 mg 30 mg              Anesthesia Record               Intraprocedure I/O Totals          Output    Urine 45 mL    Total Output 45 mL          Specimen:   ID Type Source Tests Collected by Time   1 : APPENDIX Tissue APPENDIX SURGICAL PATHOLOGY EXAM Pallavi Nunn MD 2024 1135   2 : DISTAL COLON AND OSTOMY Tissue COLOSTOMY (STOMA) SURGICAL PATHOLOGY EXAM Pallavi Nunn MD 2024 1147        Staff:   Relief Circulator: Altagracia  Circulator: Paola  Scrbib Person: Kwan Romanoub Person: Kennedy Gaona Circulator: Ambar         Drains and/or Catheters:   Closed/Suction Drain Medial LUQ (Active)       NG/OG/Feeding Tube OG - [place] sump 16 Fr Center mouth (Active)       NG/OG/Feeding Tube NG - [place] sump 16 Fr Left nostril (Active)       Tourniquet Times:          Implants:     Findings:     Indications: Jatinder Keenan is an 64 y.o. male who is having surgery for Colostomy status (Multi) [Z93.3].     The patient was seen in the preoperative area. The risks, benefits, complications, treatment options, non-operative alternatives, expected recovery and outcomes were discussed with the patient. The possibilities of reaction to medication, pulmonary aspiration, injury to surrounding structures, bleeding, recurrent infection, the need for additional procedures, failure to diagnose a condition, and creating a complication requiring transfusion or operation were discussed with the patient. The patient concurred with the proposed plan, giving informed consent.  The site of surgery was properly noted/marked if necessary per policy. The patient has been actively warmed in preoperative area. Preoperative antibiotics have been ordered and given within 1 hours of incision. Venous thrombosis prophylaxis have been ordered including bilateral sequential compression devices    Procedure Details: The patient was brought to the operating theater and placed on the operating table in the supine position.  After sufficient general anesthesia was administered he was placed in modified lithotomy and Dr. Renner.  The initial portion of the procedure.  The first step was to close the ostomy site which was done with a 0 silk suture in a running baseball stitch fashion.  Following that the abdomen and the perineum was prepped and draped in the usual sterile manner.  A midline incision was made in the skin and carried down through the subcutaneous tissue using cautery we entered through the fascia taking care not to injure any of the structures underneath.  Once we had the abdomen fully opened we assessed and found that there was minimal adhesive disease there was some small bowel adhesed into the pelvis but this was fairly readily released.  We then began taking down the adhesions of the  colostomy to the anterior abdominal wall.  Following this we went back to the skin and made an ellipse around the closure site carried out this down through the subcutaneous tissue and mobilized the colostomy in such a way that it dropped back down into the abdomen.  We then mobilized along the white line of [place]t and all the way up the descending colon to the splenic flexure.  Just a little around the corner of the splenic flexure had to be mobilized and this allowed very sufficient length and allowed the distal colon to drop down into the pelvis without any difficulty.  We then placed a Bookwalter retractor so that we could examine the pelvis and the rectum itself was mobilized the lateral stalks were incised so that there was not any significant tension on the tissue.  From below an EEA sizer was used to navigate the rectal stump and the rectal stump also was checked with a colonoscopy to ensure that there was no leakage from the stump at the outset.  We then used a 25 EEA anvil and placing a 2-0 Prolene pursestring suture in the distal colon after trimming away the colostomy we inserted the anvil and secured this.  The distal colon was cleaned so that the serosa was well exposed.  Dr. Spencer then came in at this point in time and he was able to #1 check the colon and make sure there was no leakage or bubbling of air from the colon we also then used her he used the EEA stapler to navigate through the rectal stump to the anterior wall or the distal portion of the stump the stapler was deployed the anvil and was secured to the stapler ensuring that the mesentery was not twisted.  The stapler was then closed the line was within the correct spectrum and alignment the stapler was then fired it was loosened and removed without any difficulty there were 2 very adequate donuts at the conclusion of that part of the procedure.  The colonoscope was then used to navigate into the colon once again the anastomosis was visualized  and seem to be very intact we filled the pelvis with sterile saline and there was no bubbling of air from the anastomosis it all looked quite good.  The abdomen was further inspected during the patient's colonoscopy the day prior the appendiceal orifice looked abnormal therefore we performed an appendectomy by isolating the mesoappendix and dividing this with clamps and ties and then the stump was clamped and a bowtie Vicryl was used to cross the stump it was then inverted using a 2-0 silk pursestring suture.  The abdomen was irrigated everything was dry there was no bleeding no issues or concerns we palpated the nasogastric tube in the stomach the ureteral catheters were palpated and the ureters and free of injury.  We did transversus abdominis plane block on both sides using half percent Marcaine the fascia at the ostomy site was closed the posterior sheath was closed using an 0 Vicryl suture and the anterior sheath was closed using an 0 PDS suture the fascia in the midline was closed using a #1 PDS suture since his scar tissue was fairly thick and this was the only suture that would go through therefore we used this 1 from the top 1 from the bottom taking care not to injure any of the small bowel underneath the fascia was completed in that fashion the subcu was then closed using 3-0 Vicryl interrupted sutures and a running 4-0 Vicryl subcuticular suture the ostomy site was closed with some 3-0 interrupted suture and packed with Telfa katherine in between.  A sterile dressing was applied.  Though the catheters were removed the Pineda catheter remained in place.  He tolerated this well.  Complications:  None; patient tolerated the procedure well.    Disposition: PACU - hemodynamically stable.  Condition: stable         Additional Details:     Attending Attestation:     Pallavi Nunn  Phone Number: [phone]

## 2024-09-24 NOTE — ANESTHESIA PREPROCEDURE EVALUATION
Patient: Jatinder Keenan    Procedure Information       Anesthesia Start Date/Time: 09/24/24 0809    Procedures:       CLOSURE COLOSTOMY (Abdomen)      CYSTOSCOPY WITH INSERTION URETERAL CATHETER (Abdomen)    Location: [facility] / [facility]    Surgeons: Pallavi Nunn MD; Shaquille Renner MD     Vitals:    09/24/24 0626   BP: 133/78   Pulse: 62   Resp: 17   Temp: 36 °C (96.8 °F)   SpO2: 97%       Past Surgical History:   Procedure Laterality Date   • COLONOSCOPY  03/29/2017    Complete Colonoscopy   • COLOSTOMY Bilateral 06/26/2024   • CORONARY ANGIOPLASTY WITH STENT PLACEMENT      Cath Stent Placement, x2   • ESOPHAGOGASTRODUODENOSCOPY  03/29/2017    Diagnostic Esophagogastroduodenoscopy   • LUMBAR DISCECTOMY     • MR HEAD ANGIO WO IV CONTRAST  04/30/2018    MR HEAD ANGIO WO IV CONTRAST 4/30/2018 [facility]   • MR NECK ANGIO WO IV CONTRAST  04/30/2018    MR NECK ANGIO WO IV CONTRAST 4/30/2018 [facility]   • SIGMOIDECTOMY  06/26/2024     Past Medical History:   Diagnosis Date   • Acute maxillary sinusitis, unspecified 04/03/2015    Acute maxillary sinusitis   • Body mass index (BMI) 37.0-37.9, adult 07/17/2019    BMI 37.0-37.9, adult   • CAD (coronary artery disease)    • Chest pain on breathing 09/10/2015    Chest pain on respiration   • Colostomy status    • Contact dermatitis due to poison ivy 06/22/2024   • COPD (chronic obstructive pulmonary disease)    • COVID-19 virus infection 03/16/2023   • GERD (gastroesophageal reflux disease)    • History of gastrointestinal hemorrhage 02/12/2018   • History of pulmonary embolism 06/20/2022   • Hyperlipidemia    • Hypertension    • Laceration of left elbow 03/16/2023   • Left hemiparesis    • Lumbago with sciatica, left side 07/19/2017    Acute left-sided low back pain with left-sided sciatica   • Muscle pain 03/16/2023   • Myocardial infarction (Multi)    • Other specified abnormal findings of blood chemistry     Abnormal liver function  test   • Perforated diverticulum    • Personal history of colonic polyps    • Personal history of diseases of the skin and subcutaneous tissue 01/30/2014    History of folliculitis   • Personal history of other diseases of the digestive system 03/29/2017    History of lower gastrointestinal bleeding   • Personal history of other specified conditions 01/30/2014    History of headache   • Personal history of other specified conditions 11/15/2018    History of epigastric pain   • Polymyalgia rheumatica (Multi) 11/15/2018    Polymyalgia   • TIA (transient ischemic attack)    • Weakness 05/07/2018    Left-sided weakness   • Wears glasses    • Wears partial dentures     upper, lower bridge       Current Facility-Administered Medications:   •  lactated Ringer's infusion, 20 mL/hr, intravenous, Continuous, Tyrone Vang MD, Last Rate: 20 mL/hr at 09/24/24 0702, 20 mL/hr at 09/24/24 0702    Facility-Administered Medications Ordered in Other Encounters:   •  midazolam (Versed) injection, , intravenous, PRN, Ravi Campos, 2 mg at 09/24/24 0807  Prior to Admission medications    Medication Sig Start Date End Date Taking? Authorizing Provider   aspirin 81 mg EC tablet Take 1 tablet (81 mg) by mouth once daily. 9/28/13  Yes Historical Provider, MD   ezetimibe (Zetia) 10 mg tablet Take 1 tablet (10 mg) by mouth once daily. 7/10/24 7/10/25 Yes Raza Sharma, DO   metoprolol succinate XL (Toprol-XL) 25 mg 24 hr tablet Take 1 tablet (25 mg) by mouth once daily. 7/10/24 7/10/25 Yes Raza Sharma, DO   rosuvastatin (Crestor) 20 mg tablet Take 1 tablet (20 mg) by mouth once daily. 7/10/24 7/10/25 Yes Raza Sharma, DO   nitroglycerin (Nitrostat) 0.4 mg SL tablet Place 1 tablet (0.4 mg) under the tongue every 5 minutes if needed. 4/12/13   Historical Provider, MD   polyethylene glycol (Glycolax, Miralax) 17 gram/dose powder Take 17 g by mouth once daily. Do not fill before July 2, 2024.  Patient not taking: Reported on  "9/24/2024 7/2/24   Rosalio Dutta, DO     Allergies   Allergen Reactions   • Atorvastatin Unknown     He says \"it didn't agree with me\"     Social History     Tobacco Use   • Smoking status: Some Days     Current packs/day: 0.25     Average packs/day: 0.3 packs/day for 15.0 years (3.8 ttl pk-yrs)     Types: Cigarettes   • Smokeless tobacco: Never   Substance Use Topics   • Alcohol use: Yes     Comment: socially         Chemistry    Lab Results   Component Value Date/Time     09/10/2024 0851    K 5.0 09/10/2024 0851     09/10/2024 0851    CO2 29 09/10/2024 0851    BUN 19 09/10/2024 0851    CREATININE 0.83 09/10/2024 0851    Lab Results   Component Value Date/Time    CALCIUM 9.6 09/10/2024 0851    ALKPHOS 53 09/10/2024 0851    AST 15 09/10/2024 0851    ALT 15 09/10/2024 0851    BILITOT 0.4 09/10/2024 0851          Lab Results   Component Value Date/Time    WBC 8.5 09/10/2024 0851    HGB 15.7 09/10/2024 0851    HCT 46.2 09/10/2024 0851     09/10/2024 0851     Lab Results   Component Value Date/Time    PROTIME 15.6 (H) 06/24/2024 0525    INR 1.4 (H) 06/24/2024 0525     Encounter Date: 06/23/24   ECG 12 lead   Result Value    Ventricular Rate 62    Atrial Rate 62    MS Interval 90    QRS Duration 130    QT Interval 458    QTC Calculation(Bazett) 464    P Axis -12    R Axis -21    T Axis 34    QRS Count 10    Q Onset 218    P Onset 173    P Offset 196    T Offset 447    QTC Fredericia 463    Narrative    Sinus rhythm with short MS  Right bundle branch block  Abnormal ECG  When compared with ECG of 28-JUN-2024 14:21, (unconfirmed)  No significant change was found  Confirmed by Ankit Daigle ([id number]) on 8/9/2024 10:17:47 PM     No results found for this or any previous visit from the past 1095 days.        Relevant Problems   Cardiac   (+) Coronary artery disease   (+) Hyperlipidemia   (+) Hypertension      Pulmonary   (+) Chronic obstructive pulmonary disease (Multi)   (+) Chronic pulmonary embolism " without acute cor pulmonale, unspecified pulmonary embolism type (Multi)      Neuro   (+) Transient ischemic attack (TIA)      GI   (+) GERD (gastroesophageal reflux disease)      Endocrine   (+) Diabetes mellitus, type 2 (Multi)      Musculoskeletal   (+) Degeneration of lumbar or lumbosacral intervertebral disc       Clinical information reviewed:   Tobacco  Allergies  Meds   Med Hx  Surg Hx   Fam Hx  Soc Hx        NPO Detail:  NPO/Void Status  Carbohydrate Drink Given Prior to Surgery? : N  Date of Last Liquid: 09/23/24  Time of Last Liquid: 2000  Date of Last Solid: 09/21/24  Time of Last Solid: 1700  Last Intake Type: Clear fluids  Time of Last Void: 0600         Physical Exam    Airway  Mallampati: II  TM distance: >3 FB  Neck ROM: full     Cardiovascular - normal exam     Dental    Pulmonary   (+) decreased breath sounds     Abdominal - normal exam       Other findings: Preop Duoneb      Anesthesia Plan    History of general anesthesia?: yes  History of complications of general anesthesia?: no    ASA 3     general     The patient is a current smoker.    intravenous induction   Postoperative administration of opioids is intended.  Trial extubation is planned.  Anesthetic plan and risks discussed with patient.  Use of blood products discussed with patient who.    Plan discussed with CRNA and attending.

## 2024-09-24 NOTE — CONSULTS
"Consult  Patient: Jatinder Keenan   Age: 64 y.o. Gender: male     Consult Reason:     History Of Present Illness  Jatinder Keenan is a 64 y.o. year old male Ex smoker with a history of CAD s/p MI and PCI to RCA x 2 (2016), HTN, HLD, remote PE, and CVA (not on Eliquis) presented to the hospital for colostomy closure. Pt is seen post op. He is arouseable but would quick fall back asleep.     Review of Systems:  Review of Systems   Reason unable to perform ROS: still lethargic from anesthesia.       Allergies:   Allergies   Allergen Reactions    Atorvastatin Unknown     He says \"it didn't agree with me\"       Past Medical History:  Past Medical History:   Diagnosis Date    Acute maxillary sinusitis, unspecified 04/03/2015    Acute maxillary sinusitis    Body mass index (BMI) 37.0-37.9, adult 07/17/2019    BMI 37.0-37.9, adult    CAD (coronary artery disease)     Chest pain on breathing 09/10/2015    Chest pain on respiration    Colostomy status     Contact dermatitis due to poison ivy 06/22/2024    COPD (chronic obstructive pulmonary disease)     COVID-19 virus infection 03/16/2023    GERD (gastroesophageal reflux disease)     History of gastrointestinal hemorrhage 02/12/2018    History of pulmonary embolism 06/20/2022    Hyperlipidemia     Hypertension     Laceration of left elbow 03/16/2023    Left hemiparesis     Lumbago with sciatica, left side 07/19/2017    Acute left-sided low back pain with left-sided sciatica    Muscle pain 03/16/2023    Myocardial infarction (Multi)     Other specified abnormal findings of blood chemistry     Abnormal liver function test    Perforated diverticulum     Personal history of colonic polyps     Personal history of diseases of the skin and subcutaneous tissue 01/30/2014    History of folliculitis    Personal history of other diseases of the digestive system 03/29/2017    History of lower gastrointestinal bleeding    Personal history of other specified conditions " 01/30/2014    History of headache    Personal history of other specified conditions 11/15/2018    History of epigastric pain    Polymyalgia rheumatica (Multi) 11/15/2018    Polymyalgia    TIA (transient ischemic attack)     Weakness 05/07/2018    Left-sided weakness    Wears glasses     Wears partial dentures     upper, lower bridge       Past Surgical History:   Past Surgical History:   Procedure Laterality Date    COLONOSCOPY  03/29/2017    Complete Colonoscopy    COLOSTOMY Bilateral 06/26/2024    CORONARY ANGIOPLASTY WITH STENT PLACEMENT      Cath Stent Placement, x2    ESOPHAGOGASTRODUODENOSCOPY  03/29/2017    Diagnostic Esophagogastroduodenoscopy    LUMBAR DISCECTOMY      MR HEAD ANGIO WO IV CONTRAST  04/30/2018    MR HEAD ANGIO WO IV CONTRAST 4/30/2018 [facility]    MR NECK ANGIO WO IV CONTRAST  04/30/2018    MR NECK ANGIO WO IV CONTRAST 4/30/2018 [facility]    SIGMOIDECTOMY  06/26/2024       Family History:  Family History   Problem Relation Name Age of Onset    Diabetes Mother      Heart failure Father         Social History:  Social History     Tobacco Use   Smoking Status Some Days    Current packs/day: 0.25    Average packs/day: 0.3 packs/day for 15.0 years (3.8 ttl pk-yrs)    Types: Cigarettes   Smokeless Tobacco Never       Social History     Substance and Sexual Activity   Alcohol Use Yes    Comment: socially       Social History     Substance and Sexual Activity   Drug Use Not Currently    Types: Marijuana       Home Medications:  Current Outpatient Medications   Medication Instructions    aspirin 81 mg, oral, Daily    ezetimibe (ZETIA) 10 mg, oral, Daily    metoprolol succinate XL (TOPROL-XL) 25 mg, oral, Daily    nitroglycerin (NITROSTAT) 0.4 mg, sublingual, Every 5 min PRN    polyethylene glycol (Glycolax, Miralax) 17 gram/dose powder Take 17 g by mouth once daily. Do not fill before July 2, 2024.    rosuvastatin (CRESTOR) 20 mg, oral, Daily       Vitals:  Visit Vitals  BP  "119/75   Pulse 71   Temp 36.5 °C (97.7 °F)   Resp 15   Ht 1.702 m (5' 7\")   Wt 77 kg (169 lb 12.1 oz)   SpO2 94%   BMI 26.59 kg/m²   Smoking Status Some Days   BSA 1.91 m²       Physical Exam  Vitals and nursing note reviewed.   Constitutional:       General: He is not in acute distress.     Appearance: Normal appearance. He is ill-appearing. He is not toxic-appearing.   HENT:      Head: Normocephalic and atraumatic.      Nose: Nose normal.      Mouth/Throat:      Mouth: Mucous membranes are moist.   Eyes:      Extraocular Movements: Extraocular movements intact.      Conjunctiva/sclera: Conjunctivae normal.      Pupils: Pupils are equal, round, and reactive to light.   Cardiovascular:      Rate and Rhythm: Normal rate and regular rhythm.      Heart sounds: No murmur heard.     No gallop.   Pulmonary:      Effort: Pulmonary effort is normal. No respiratory distress.      Breath sounds: Normal breath sounds. No wheezing, rhonchi or rales.   Abdominal:      General: There is distension.      Palpations: There is no mass.      Tenderness: There is abdominal tenderness.      Comments: Surgical site: c/d/i, drain in place   Musculoskeletal:         General: No swelling or tenderness. Normal range of motion.      Cervical back: Normal range of motion and neck supple.   Skin:     General: Skin is warm and dry.   Neurological:      Comments: Arouseable but quickly falls asleep   Psychiatric:      Comments: Limited exam         Labs and Imaging Results:  Lab Results   Component Value Date    WBC 8.5 09/10/2024       Colonoscopy Screening; Average Risk Patient  Table formatting from the original result was not included.  Impression  The ileocecal valve and cecum appeared normal.  Abnormal mucosa    Findings  The ileocecal valve and cecum appeared normal.  Edematous mucosa in the cecum  Atrophic and erythematous mucosa in the rectum. Rectal stump 15 cm;       Recommendation  Await pathology results   Repeat screening " colonoscopy in 10 years, due: 9/21/2034     Indication  Personal history of colonic polyps    Staff  Staff Role   Pallavi Nunn MD Proceduralist     Medications  See Anesthesia Record.     Preprocedure  A history and physical has been performed, and patient medication   allergies have been reviewed. The patient's tolerance of previous   anesthesia has been reviewed. The risks and benefits of the procedure and   the sedation options and risks were discussed with the patient. All   questions were answered and informed consent obtained.    Details of the Procedure  The patient underwent monitored anesthesia care, which was administered by   an anesthesia professional. The patient's blood pressure, ECG, ETCO2,   heart rate, level of consciousness, oxygen and respirations were monitored   throughout the procedure. A digital rectal exam was performed. The scope   was introduced through the anus and stoma and advanced to the cecum.   Retroflexion was performed in the rectum. The quality of bowel preparation   was evaluated using the [place] Bowel Preparation Scale with scores of:   right colon = 3, transverse colon = 3, left colon = 3. The total BBPS   score was 9. Bowel prep was adequate. The patient experienced no blood   loss. The procedure was not difficult. The patient tolerated the procedure   well. There were no apparent adverse events.     Events  Procedure Events   Event Event Time   ENDO SCOPE IN TIME 9/23/2024  7:32 AM   ENDO CECUM REACHED 9/23/2024  7:36 AM     Specimens  ID Type Source Tests Collected by Time   1 : APPENDICEAL ORFICE ABNORMAL MUCOSA Tissue COLON - CECUM BIOPSY   SURGICAL PATHOLOGY EXAM Pallavi Nunn MD 9/23/2024 0738     Procedure Location  [facility] [place]  [address] [facility] [id number] [phone]    Referring Provider  Pallavi Nunn MD    Procedure Provider  MD Pallavi Hurtado      Assessment and Plan:    Principal Problem:     Colostomy status (Multi)      Patient Active Problem List   Diagnosis    Chronic obstructive pulmonary disease (Multi)    Coronary artery disease    Transient ischemic attack (TIA)    Abdominal bruit    Allergic rhinitis    Hooper esophagus    Diabetes mellitus, type 2 (Multi)    Erectile dysfunction    Gastritis    GERD (gastroesophageal reflux disease)    Hip pain, left    Hyperlipidemia    Hypertension    Left elbow pain    Nicotine dependence    Polyarthritis    Vitamin D deficiency    Bronchitis    History of pulmonary embolism    Left hemiparesis (Multi)    History of percutaneous coronary intervention    Hyperglycemia    History of myocardial infarction    Degeneration of lumbar or lumbosacral intervertebral disc    Abnormal liver function tests    Diverticulitis of large intestine with abscess without bleeding    Diverticulitis of colon with perforation    Chronic pulmonary embolism without acute cor pulmonale, unspecified pulmonary embolism type (Multi)    Colostomy status (Multi)     Jatinder Keenan is a 63 y.o. male Ex smoker with a history of CAD s/p MI and PCI to RCA x 2 (2016), HTN, HLD, remote PE, and CVA (not on Eliquis) presented to the hospital for colostomy closure     Sp closure colostomy, flex sig, appendectomy, Cystoscopy, bilateral ureteral catheter placement 9/24  - management per primary   - Ancef  - flagyl  - IVF  - morphine PRN  - zofran RPN  - NPO      CAD  - hold crestor  - hold zetia  - hold ASA     HTN  - hold metoprolol     Resume PO meds when allow PO intake again    DVT prophylaxis: SCD  Dispo: monitor clinically    60 minutes were spent on consult  Thank you for consult, please dochalo with any questions.    Sushila Ferguson MD  Hospitalist

## 2024-09-24 NOTE — OP NOTE
CLOSURE COLOSTOMY Operative Note     Date: 2024  OR Location: [facility] OR    Name: Jatinder Keenan, : 1960, Age: 64 y.o., MRN: [id number], Sex: male    Diagnosis  Pre-op Diagnosis      * Colostomy status (Multi) [Z93.3] Post-op Diagnosis     * Colostomy status (Multi) [Z93.3]     Procedures  Cystoscopy, bilateral ureteral catheter placement    Surgeons   Dr. Shaquille Salcedo    Resident/Fellow/Other Assistant:  Surgeons and Role:  * No surgeons found with a matching role *    Procedure Summary  Anesthesia: General  ASA: III  Anesthesia Staff: Anesthesiologist: Hector Rodríguez MD  CRNA: МАРИНА Dacosta-DAYANARA  SRNA: Ravi Campos  Estimated Blood Loss: 0 mL  Intra-op Medications:   Administrations occurring from 0735 to 1205 on 24:   Medication Name Total Dose   metroNIDAZOLE (Flagyl) 500 mg in sodium chloride (iso)  mL 500 mg   enoxaparin (Lovenox) syringe 30 mg 30 mg              Anesthesia Record               Intraprocedure I/O Totals       None           Specimen: No specimens collected     Staff:   Relief Circulator: Altagracia  Circulator: Paola  Scrbib Person: Kwan  Scrub Person: Kennedy           Drains and/or Catheters: Bilateral 5 [place]ogolese whistle-tip catheters    Findings: No bladder lesions, lateral lobe prostate enlargement, catheters placed without difficulty    Indications: Jatinder Keenan is an 64 y.o. male who is having surgery for Colostomy status (Multi) [Z93.3].  Bilateral ureteral catheters were requested.  Risks were reviewed and consent was obtained.    Procedure Details: Patient was taken to the operating room and given general anesthesia.  He was placed in the modified lithotomy position and sterilely prepped and draped.  A cystoscope was advanced through the urethra and into the bladder.  No urethral lesions were present.  He had enlargement of the lateral lobes of the prostate.  No bladder tumors or stones were identified.  The ureteral orifice ease were  identified.  Bilateral 5 [place] whistle-tip catheters were placed without difficulty.  The cystoscope was withdrawn.  A Pineda catheter was placed.  The ureteral catheters were tied to the Pineda catheter.  All 3 catheters were secured to a collection device.  The patient tolerated this portion of the procedure.    Complications:  None; patient tolerated the procedure well.              Molly Nunn  Phone Number: [phone]

## 2024-09-24 NOTE — ANESTHESIA POSTPROCEDURE EVALUATION
Patient: Jatinder Keenan    Procedure Summary       Date: 09/24/24 Room / Location: [facility] OR 06 / [facility]    Anesthesia Start: 0809 Anesthesia Stop: 1240    Procedures:       CLOSURE COLOSTOMY (Abdomen)      CYSTOSCOPY WITH INSERTION URETERAL CATHETER (Abdomen) Diagnosis:       Colostomy status (Multi)      (Colostomy status (Multi) [Z93.3])    Surgeons: Pallavi Nunn MD; Shaquille Renner MD Responsible Provider: Hector Rodríguez MD    Anesthesia Type: general ASA Status: 3            Anesthesia Type: general    Vitals Value Taken Time   /75 09/24/24 1408   Temp 36.5 °C (97.7 °F) 09/24/24 1408   Pulse 71 09/24/24 1408   Resp 15 09/24/24 1408   SpO2 94 % 09/24/24 1408       Anesthesia Post Evaluation    Patient location during evaluation: PACU  Patient participation: complete - patient participated  Level of consciousness: awake  Pain score: 2  Pain management: adequate  Multimodal analgesia pain management approach  Airway patency: patent  Two or more strategies used to mitigate risk of obstructive sleep apnea  Cardiovascular status: acceptable  Respiratory status: acceptable  Hydration status: acceptable  Postoperative Nausea and Vomiting: none    There were no known notable events for this encounter.

## 2024-09-24 NOTE — CARE PLAN
The patient's goals for the shift include      The clinical goals for the shift include mobility      Problem: HP General Problem  Goal: HP General Goal  Outcome: Progressing

## 2024-09-24 NOTE — CARE PLAN
The patient's goals for the shift include      The clinical goals for the shift include mobility      Problem: HP General Problem  Goal: HP General Goal  9/24/2024 1522 by Yohana Hughes RN  Outcome: Progressing  9/24/2024 1522 by Yohana Hughes, RN  Outcome: Progressing

## 2024-09-25 LAB
ALBUMIN SERPL BCP-MCNC: 3.6 G/DL (ref 3.4–5)
ALP SERPL-CCNC: 35 U/L (ref 33–136)
ALT SERPL W P-5'-P-CCNC: 14 U/L (ref 10–52)
ANION GAP SERPL CALC-SCNC: 11 MMOL/L (ref 10–20)
AST SERPL W P-5'-P-CCNC: 14 U/L (ref 9–39)
BASOPHILS # BLD AUTO: 0.03 X10*3/UL (ref 0–0.1)
BASOPHILS NFR BLD AUTO: 0.3 %
BILIRUB SERPL-MCNC: 0.7 MG/DL (ref 0–1.2)
BUN SERPL-MCNC: 15 MG/DL (ref 6–23)
CALCIUM SERPL-MCNC: 8.3 MG/DL (ref 8.6–10.3)
CHLORIDE SERPL-SCNC: 103 MMOL/L (ref 98–107)
CO2 SERPL-SCNC: 28 MMOL/L (ref 21–32)
CREAT SERPL-MCNC: 0.89 MG/DL (ref 0.5–1.3)
EGFRCR SERPLBLD CKD-EPI 2021: >90 ML/MIN/1.73M*2
EOSINOPHIL # BLD AUTO: 0.01 X10*3/UL (ref 0–0.7)
EOSINOPHIL NFR BLD AUTO: 0.1 %
ERYTHROCYTE [DISTWIDTH] IN BLOOD BY AUTOMATED COUNT: 12.3 % (ref 11.5–14.5)
GLUCOSE BLD MANUAL STRIP-MCNC: 107 MG/DL (ref 74–99)
GLUCOSE BLD MANUAL STRIP-MCNC: 109 MG/DL (ref 74–99)
GLUCOSE BLD MANUAL STRIP-MCNC: 129 MG/DL (ref 74–99)
GLUCOSE BLD MANUAL STRIP-MCNC: 141 MG/DL (ref 74–99)
GLUCOSE SERPL-MCNC: 106 MG/DL (ref 74–99)
HCT VFR BLD AUTO: 43.8 % (ref 41–52)
HGB BLD-MCNC: 14.8 G/DL (ref 13.5–17.5)
IMM GRANULOCYTES # BLD AUTO: 0.04 X10*3/UL (ref 0–0.7)
IMM GRANULOCYTES NFR BLD AUTO: 0.4 % (ref 0–0.9)
LYMPHOCYTES # BLD AUTO: 1.51 X10*3/UL (ref 1.2–4.8)
LYMPHOCYTES NFR BLD AUTO: 14.8 %
MCH RBC QN AUTO: 33 PG (ref 26–34)
MCHC RBC AUTO-ENTMCNC: 33.8 G/DL (ref 32–36)
MCV RBC AUTO: 98 FL (ref 80–100)
MONOCYTES # BLD AUTO: 1.01 X10*3/UL (ref 0.1–1)
MONOCYTES NFR BLD AUTO: 9.9 %
NEUTROPHILS # BLD AUTO: 7.61 X10*3/UL (ref 1.2–7.7)
NEUTROPHILS NFR BLD AUTO: 74.5 %
NRBC BLD-RTO: 0 /100 WBCS (ref 0–0)
PLATELET # BLD AUTO: 203 X10*3/UL (ref 150–450)
POTASSIUM SERPL-SCNC: 4.1 MMOL/L (ref 3.5–5.3)
PROT SERPL-MCNC: 5.9 G/DL (ref 6.4–8.2)
RBC # BLD AUTO: 4.48 X10*6/UL (ref 4.5–5.9)
SODIUM SERPL-SCNC: 138 MMOL/L (ref 136–145)
WBC # BLD AUTO: 10.2 X10*3/UL (ref 4.4–11.3)

## 2024-09-25 PROCEDURE — 80053 COMPREHEN METABOLIC PANEL: CPT | Performed by: STUDENT IN AN ORGANIZED HEALTH CARE EDUCATION/TRAINING PROGRAM

## 2024-09-25 PROCEDURE — 2500000002 HC RX 250 W HCPCS SELF ADMINISTERED DRUGS (ALT 637 FOR MEDICARE OP, ALT 636 FOR OP/ED): Performed by: NURSE PRACTITIONER

## 2024-09-25 PROCEDURE — 99233 SBSQ HOSP IP/OBS HIGH 50: CPT | Performed by: NURSE PRACTITIONER

## 2024-09-25 PROCEDURE — 36415 COLL VENOUS BLD VENIPUNCTURE: CPT | Performed by: STUDENT IN AN ORGANIZED HEALTH CARE EDUCATION/TRAINING PROGRAM

## 2024-09-25 PROCEDURE — 82947 ASSAY GLUCOSE BLOOD QUANT: CPT

## 2024-09-25 PROCEDURE — 2500000004 HC RX 250 GENERAL PHARMACY W/ HCPCS (ALT 636 FOR OP/ED): Performed by: STUDENT IN AN ORGANIZED HEALTH CARE EDUCATION/TRAINING PROGRAM

## 2024-09-25 PROCEDURE — 99232 SBSQ HOSP IP/OBS MODERATE 35: CPT | Performed by: NURSE PRACTITIONER

## 2024-09-25 PROCEDURE — 2500000001 HC RX 250 WO HCPCS SELF ADMINISTERED DRUGS (ALT 637 FOR MEDICARE OP): Performed by: NURSE PRACTITIONER

## 2024-09-25 PROCEDURE — 1200000002 HC GENERAL ROOM WITH TELEMETRY DAILY

## 2024-09-25 PROCEDURE — 2500000004 HC RX 250 GENERAL PHARMACY W/ HCPCS (ALT 636 FOR OP/ED): Performed by: NURSE PRACTITIONER

## 2024-09-25 PROCEDURE — 2500000004 HC RX 250 GENERAL PHARMACY W/ HCPCS (ALT 636 FOR OP/ED): Performed by: SURGERY

## 2024-09-25 PROCEDURE — 85025 COMPLETE CBC W/AUTO DIFF WBC: CPT | Performed by: STUDENT IN AN ORGANIZED HEALTH CARE EDUCATION/TRAINING PROGRAM

## 2024-09-25 RX ORDER — METOPROLOL SUCCINATE 25 MG/1
25 TABLET, EXTENDED RELEASE ORAL DAILY
Status: DISCONTINUED | OUTPATIENT
Start: 2024-09-25 | End: 2024-09-30 | Stop reason: HOSPADM

## 2024-09-25 RX ORDER — ASPIRIN 81 MG/1
81 TABLET ORAL DAILY
Status: DISCONTINUED | OUTPATIENT
Start: 2024-09-25 | End: 2024-09-30 | Stop reason: HOSPADM

## 2024-09-25 RX ORDER — HYDROMORPHONE HYDROCHLORIDE 1 MG/ML
1 INJECTION, SOLUTION INTRAMUSCULAR; INTRAVENOUS; SUBCUTANEOUS EVERY 4 HOURS PRN
Status: DISCONTINUED | OUTPATIENT
Start: 2024-09-25 | End: 2024-09-29

## 2024-09-25 RX ORDER — EZETIMIBE 10 MG/1
10 TABLET ORAL DAILY
Status: DISCONTINUED | OUTPATIENT
Start: 2024-09-25 | End: 2024-09-30 | Stop reason: HOSPADM

## 2024-09-25 RX ORDER — ENOXAPARIN SODIUM 100 MG/ML
40 INJECTION SUBCUTANEOUS DAILY
Status: CANCELLED | OUTPATIENT
Start: 2024-09-25

## 2024-09-25 RX ORDER — ENOXAPARIN SODIUM 100 MG/ML
40 INJECTION SUBCUTANEOUS DAILY
Status: DISCONTINUED | OUTPATIENT
Start: 2024-09-25 | End: 2024-09-30 | Stop reason: HOSPADM

## 2024-09-25 RX ORDER — DEXTROSE MONOHYDRATE, SODIUM CHLORIDE, AND POTASSIUM CHLORIDE 50; 1.49; 4.5 G/1000ML; G/1000ML; G/1000ML
75 INJECTION, SOLUTION INTRAVENOUS CONTINUOUS
Status: DISCONTINUED | OUTPATIENT
Start: 2024-09-25 | End: 2024-09-29

## 2024-09-25 RX ORDER — ROSUVASTATIN CALCIUM 20 MG/1
20 TABLET, COATED ORAL DAILY
Status: DISCONTINUED | OUTPATIENT
Start: 2024-09-25 | End: 2024-09-30 | Stop reason: HOSPADM

## 2024-09-25 RX ADMIN — PHENOL 1 SPRAY: 1.4 SPRAY ORAL at 11:47

## 2024-09-25 RX ADMIN — HYDROMORPHONE HYDROCHLORIDE 1 MG: 1 INJECTION, SOLUTION INTRAMUSCULAR; INTRAVENOUS; SUBCUTANEOUS at 14:29

## 2024-09-25 RX ADMIN — PHENOL 1 SPRAY: 1.4 SPRAY ORAL at 17:42

## 2024-09-25 RX ADMIN — ONDANSETRON 4 MG: 2 INJECTION, SOLUTION INTRAMUSCULAR; INTRAVENOUS at 05:19

## 2024-09-25 RX ADMIN — HYDROMORPHONE HYDROCHLORIDE 1 MG: 1 INJECTION, SOLUTION INTRAMUSCULAR; INTRAVENOUS; SUBCUTANEOUS at 22:11

## 2024-09-25 RX ADMIN — POTASSIUM CHLORIDE, DEXTROSE MONOHYDRATE AND SODIUM CHLORIDE 100 ML/HR: 150; 5; 450 INJECTION, SOLUTION INTRAVENOUS at 12:24

## 2024-09-25 RX ADMIN — METOPROLOL SUCCINATE 25 MG: 25 TABLET, EXTENDED RELEASE ORAL at 11:47

## 2024-09-25 RX ADMIN — ENOXAPARIN SODIUM 40 MG: 40 INJECTION SUBCUTANEOUS at 12:28

## 2024-09-25 RX ADMIN — MORPHINE SULFATE 4 MG: 4 INJECTION INTRAVENOUS at 00:52

## 2024-09-25 RX ADMIN — ROSUVASTATIN CALCIUM 20 MG: 20 TABLET, FILM COATED ORAL at 11:47

## 2024-09-25 RX ADMIN — PHENOL 1 SPRAY: 1.4 SPRAY ORAL at 23:09

## 2024-09-25 RX ADMIN — HYDROMORPHONE HYDROCHLORIDE 1 MG: 1 INJECTION, SOLUTION INTRAMUSCULAR; INTRAVENOUS; SUBCUTANEOUS at 10:19

## 2024-09-25 RX ADMIN — ASPIRIN 81 MG: 81 TABLET, COATED ORAL at 11:47

## 2024-09-25 RX ADMIN — EZETIMIBE 10 MG: 10 TABLET ORAL at 11:47

## 2024-09-25 RX ADMIN — MORPHINE SULFATE 4 MG: 4 INJECTION INTRAVENOUS at 05:08

## 2024-09-25 RX ADMIN — METRONIDAZOLE 500 MG: 500 INJECTION, SOLUTION INTRAVENOUS at 00:52

## 2024-09-25 RX ADMIN — HYDROMORPHONE HYDROCHLORIDE 1 MG: 1 INJECTION, SOLUTION INTRAMUSCULAR; INTRAVENOUS; SUBCUTANEOUS at 18:01

## 2024-09-25 RX ADMIN — METRONIDAZOLE 500 MG: 500 INJECTION, SOLUTION INTRAVENOUS at 09:23

## 2024-09-25 RX ADMIN — PANTOPRAZOLE SODIUM 40 MG: 40 INJECTION, POWDER, FOR SOLUTION INTRAVENOUS at 05:08

## 2024-09-25 RX ADMIN — CEFAZOLIN SODIUM 1 G: 1 INJECTION, SOLUTION INTRAVENOUS at 08:20

## 2024-09-25 RX ADMIN — POTASSIUM CHLORIDE, DEXTROSE MONOHYDRATE AND SODIUM CHLORIDE 100 ML/HR: 150; 5; 450 INJECTION, SOLUTION INTRAVENOUS at 22:30

## 2024-09-25 RX ADMIN — SODIUM CHLORIDE 500 ML: 9 INJECTION, SOLUTION INTRAVENOUS at 08:17

## 2024-09-25 RX ADMIN — PHENOL 1 SPRAY: 1.4 SPRAY ORAL at 08:24

## 2024-09-25 RX ADMIN — ONDANSETRON 4 MG: 2 INJECTION, SOLUTION INTRAMUSCULAR; INTRAVENOUS at 22:17

## 2024-09-25 RX ADMIN — MORPHINE SULFATE 4 MG: 4 INJECTION INTRAVENOUS at 08:34

## 2024-09-25 ASSESSMENT — PAIN SCALES - GENERAL
PAINLEVEL_OUTOF10: 10 - WORST POSSIBLE PAIN
PAINLEVEL_OUTOF10: 8
PAINLEVEL_OUTOF10: 10 - WORST POSSIBLE PAIN
PAINLEVEL_OUTOF10: 0 - NO PAIN
PAINLEVEL_OUTOF10: 9
PAINLEVEL_OUTOF10: 7
PAINLEVEL_OUTOF10: 8
PAINLEVEL_OUTOF10: 8
PAINLEVEL_OUTOF10: 10 - WORST POSSIBLE PAIN
PAINLEVEL_OUTOF10: 7
PAINLEVEL_OUTOF10: 7

## 2024-09-25 ASSESSMENT — COGNITIVE AND FUNCTIONAL STATUS - GENERAL
CLIMB 3 TO 5 STEPS WITH RAILING: A LITTLE
TOILETING: A LITTLE
MOVING TO AND FROM BED TO CHAIR: A LITTLE
TOILETING: A LITTLE
TURNING FROM BACK TO SIDE WHILE IN FLAT BAD: A LITTLE
CLIMB 3 TO 5 STEPS WITH RAILING: A LITTLE
STANDING UP FROM CHAIR USING ARMS: A LITTLE
DRESSING REGULAR UPPER BODY CLOTHING: A LITTLE
MOVING FROM LYING ON BACK TO SITTING ON SIDE OF FLAT BED WITH BEDRAILS: A LITTLE
DRESSING REGULAR LOWER BODY CLOTHING: A LITTLE
DAILY ACTIVITIY SCORE: 20
MOBILITY SCORE: 18
DAILY ACTIVITIY SCORE: 20
MOVING TO AND FROM BED TO CHAIR: A LITTLE
HELP NEEDED FOR BATHING: A LITTLE
WALKING IN HOSPITAL ROOM: A LITTLE
STANDING UP FROM CHAIR USING ARMS: A LITTLE
MOVING FROM LYING ON BACK TO SITTING ON SIDE OF FLAT BED WITH BEDRAILS: A LITTLE
WALKING IN HOSPITAL ROOM: A LITTLE
HELP NEEDED FOR BATHING: A LITTLE
MOBILITY SCORE: 18
DRESSING REGULAR UPPER BODY CLOTHING: A LITTLE
TURNING FROM BACK TO SIDE WHILE IN FLAT BAD: A LITTLE
DRESSING REGULAR LOWER BODY CLOTHING: A LITTLE

## 2024-09-25 ASSESSMENT — PAIN - FUNCTIONAL ASSESSMENT
PAIN_FUNCTIONAL_ASSESSMENT: 0-10

## 2024-09-25 ASSESSMENT — PAIN DESCRIPTION - LOCATION
LOCATION: ABDOMEN

## 2024-09-25 ASSESSMENT — ACTIVITIES OF DAILY LIVING (ADL): LACK_OF_TRANSPORTATION: NO

## 2024-09-25 ASSESSMENT — PAIN SCALES - PAIN ASSESSMENT IN ADVANCED DEMENTIA (PAINAD): TOTALSCORE: MEDICATION (SEE MAR)

## 2024-09-25 NOTE — PROGRESS NOTES
09/25/24 1225   Discharge Planning   Living Arrangements Spouse/significant other   Support Systems Spouse/significant other;Family members;Friends/neighbors   Assistance Needed Patient is A&O X3, on room air at baseline (currently on O2), is independent with ADLs, drives and uses no DME at home. Per wife patient was previously active with [facility] but it not currently active with any home care agencies at this time. DC needs TBD closer to DC. Patient currently has NG in place. Per surgery: NG to LIWS to stay in place until return of bowel function and passing gas.   Type of Residence Private residence   Number of Stairs to Enter Residence 2   Number of Stairs Within Residence 14   Do you have animals or pets at home? No   Who is requesting discharge planning? Provider   Home or Post Acute Services In home services   Type of Home Care Services Home PT;Home OT;Home nursing visits   Expected Discharge Disposition Home H   Does the patient need discharge transport arranged? No   Financial Resource Strain   How hard is it for you to pay for the very basics like food, housing, medical care, and heating? Not very   Housing Stability   In the last 12 months, was there a time when you were not able to pay the mortgage or rent on time? N   At any time in the past 12 months, were you homeless or living in a shelter (including now)? N   Transportation Needs   In the past 12 months, has lack of transportation kept you from medical appointments or from getting medications? no   In the past 12 months, has lack of transportation kept you from meetings, work, or from getting things needed for daily living? No   Patient Choice   Provider Choice list and CMS website (https://medicare.gov/care-compare#search) for post-acute Quality and Resource Measure Data were provided and reviewed with: Patient;Family   Patient / Family choosing to utilize agency / facility established prior to hospitalization No

## 2024-09-25 NOTE — PROGRESS NOTES
Jatinder Keenan is a 64 y.o. male on day 1 of admission presenting with Colostomy status (Multi).      Subjective   The patient complains of incisional pain level 10/10 this morning.        Objective     Last Recorded Vitals  /68   Pulse 71   Temp 36.6 °C (97.8 °F)   Resp 16   Wt 82.6 kg (182 lb 1.6 oz)   SpO2 95%   Intake/Output last 3 Shifts:    Intake/Output Summary (Last 24 hours) at 9/25/2024 0959  Last data filed at 9/25/2024 0358  Gross per 24 hour   Intake 2043.34 ml   Output 1425 ml   Net 618.34 ml       Admission Weight  Weight: 77 kg (169 lb 12.1 oz) (09/24/24 0626)    Daily Weight  09/25/24 : 82.6 kg (182 lb 1.6 oz)    Image Results  Colonoscopy Screening; Average Risk Patient  Table formatting from the original result was not included.  Impression  The ileocecal valve and cecum appeared normal.  Abnormal mucosa    Findings  The ileocecal valve and cecum appeared normal.  Edematous mucosa in the cecum  Atrophic and erythematous mucosa in the rectum. Rectal stump 15 cm;       Recommendation  Await pathology results   Repeat screening colonoscopy in 10 years, due: 9/21/2034     Indication  Personal history of colonic polyps    Staff  Staff Role   Pallavi Nunn MD Proceduralist     Medications  See Anesthesia Record.     Preprocedure  A history and physical has been performed, and patient medication   allergies have been reviewed. The patient's tolerance of previous   anesthesia has been reviewed. The risks and benefits of the procedure and   the sedation options and risks were discussed with the patient. All   questions were answered and informed consent obtained.    Details of the Procedure  The patient underwent monitored anesthesia care, which was administered by   an anesthesia professional. The patient's blood pressure, ECG, ETCO2,   heart rate, level of consciousness, oxygen and respirations were monitored   throughout the procedure. A digital rectal exam was performed. The scope    was introduced through the anus and stoma and advanced to the cecum.   Retroflexion was performed in the rectum. The quality of bowel preparation   was evaluated using the [place] Bowel Preparation Scale with scores of:   right colon = 3, transverse colon = 3, left colon = 3. The total BBPS   score was 9. Bowel prep was adequate. The patient experienced no blood   loss. The procedure was not difficult. The patient tolerated the procedure   well. There were no apparent adverse events.     Events  Procedure Events   Event Event Time   ENDO SCOPE IN TIME 9/23/2024  7:32 AM   ENDO CECUM REACHED 9/23/2024  7:36 AM     Specimens  ID Type Source Tests Collected by Time   1 : APPENDICEAL ORFICE ABNORMAL MUCOSA Tissue COLON - CECUM BIOPSY   SURGICAL PATHOLOGY EXAM Pallavi Nunn MD 9/23/2024 0738     Procedure Location  [facility] [place]  [address] [place] [address]  [phone]    Referring Provider  Pallavi Nunn MD    Procedure Provider  MD Pallavi Hurtado      Physical Exam  HENT:      Mouth/Throat:      Mouth: Mucous membranes are dry.   Eyes:      Extraocular Movements: Extraocular movements intact.   Cardiovascular:      Rate and Rhythm: Regular rhythm.   Pulmonary:      Breath sounds: Normal breath sounds.   Abdominal:      General: There is distension.      Palpations: Abdomen is soft.      Tenderness: There is abdominal tenderness.      Comments: Surgical site: c/d/i, drain in place    Musculoskeletal:      Right lower leg: No edema.      Left lower leg: No edema.   Skin:     Capillary Refill: Capillary refill takes less than 2 seconds.   Neurological:      Mental Status: He is alert and oriented to person, place, and time.   Psychiatric:         Mood and Affect: Mood normal.         Relevant Results      Results for orders placed or performed during the hospital encounter of 09/24/24 (from the past 24 hour(s))   POCT GLUCOSE   Result Value Ref Range     POCT Glucose 101 (H) 74 - 99 mg/dL   POCT GLUCOSE   Result Value Ref Range    POCT Glucose 115 (H) 74 - 99 mg/dL   Comprehensive Metabolic Panel   Result Value Ref Range    Glucose 106 (H) 74 - 99 mg/dL    Sodium 138 136 - 145 mmol/L    Potassium 4.1 3.5 - 5.3 mmol/L    Chloride 103 98 - 107 mmol/L    Bicarbonate 28 21 - 32 mmol/L    Anion Gap 11 10 - 20 mmol/L    Urea Nitrogen 15 6 - 23 mg/dL    Creatinine 0.89 0.50 - 1.30 mg/dL    eGFR >90 >60 mL/min/1.73m*2    Calcium 8.3 (L) 8.6 - 10.3 mg/dL    Albumin 3.6 3.4 - 5.0 g/dL    Alkaline Phosphatase 35 33 - 136 U/L    Total Protein 5.9 (L) 6.4 - 8.2 g/dL    AST 14 9 - 39 U/L    Bilirubin, Total 0.7 0.0 - 1.2 mg/dL    ALT 14 10 - 52 U/L   CBC and Auto Differential   Result Value Ref Range    WBC 10.2 4.4 - 11.3 x10*3/uL    nRBC 0.0 0.0 - 0.0 /100 WBCs    RBC 4.48 (L) 4.50 - 5.90 x10*6/uL    Hemoglobin 14.8 13.5 - 17.5 g/dL    Hematocrit 43.8 41.0 - 52.0 %    MCV 98 80 - 100 fL    MCH 33.0 26.0 - 34.0 pg    MCHC 33.8 32.0 - 36.0 g/dL    RDW 12.3 11.5 - 14.5 %    Platelets 203 150 - 450 x10*3/uL    Neutrophils % 74.5 40.0 - 80.0 %    Immature Granulocytes %, Automated 0.4 0.0 - 0.9 %    Lymphocytes % 14.8 13.0 - 44.0 %    Monocytes % 9.9 2.0 - 10.0 %    Eosinophils % 0.1 0.0 - 6.0 %    Basophils % 0.3 0.0 - 2.0 %    Neutrophils Absolute 7.61 1.20 - 7.70 x10*3/uL    Immature Granulocytes Absolute, Automated 0.04 0.00 - 0.70 x10*3/uL    Lymphocytes Absolute 1.51 1.20 - 4.80 x10*3/uL    Monocytes Absolute 1.01 (H) 0.10 - 1.00 x10*3/uL    Eosinophils Absolute 0.01 0.00 - 0.70 x10*3/uL    Basophils Absolute 0.03 0.00 - 0.10 x10*3/uL   POCT GLUCOSE   Result Value Ref Range    POCT Glucose 109 (H) 74 - 99 mg/dL            Scheduled medications  metroNIDAZOLE, 500 mg, intravenous, q8h  pantoprazole, 40 mg, oral, Daily before breakfast   Or  pantoprazole, 40 mg, intravenous, Daily before breakfast  sodium chloride, 500 mL, intravenous, Once      Continuous  medications  lactated Ringer's, 100 mL/hr, Last Rate: 100 mL/hr (09/24/24 2281)      PRN medications  PRN medications: HYDROmorphone, HYDROmorphone, ondansetron **OR** ondansetron, phenoL     Assessment/Plan                  Jatinder Keenan is a 63 y.o. male Ex smoker with a history of CAD s/p MI and PCI to RCA x 2 (2016), HTN, HLD, remote PE, and CVA (not on Eliquis) presented to the hospital for colostomy closure     #Sp closure colostomy, flex sig, appendectomy, Cystoscopy, bilateral ureteral catheter placement 9/24  - management per primary   - Ancef complete  - flagyl continued  - IVF  - Pain not managed with morphine, will switch for Dilaudid and de escalate when appropriate   - zofran RPN  -Ng tube to LIS  -Low output noted, will add normal saline bolus  - NPO with sips      #CAD  - Resumed crestor, zetia  - hold ASA     #HTN  -Resumed metoprolol  -Monitor BP       DVT prophylaxis: SCD    Dispo: monitor clinically              Estela Bocanegra, APRN-CNP

## 2024-09-25 NOTE — CONSULTS
Do you have any home inhalers? No    When were you diagnosed: Unsure    Previous PFT: No    Pul Dr- No    Do you currently smoke or vape? Current smoker    Do you have a primary Dr?   Team Member Role and Specialty Contact Info Address Start End Comments   PCPs         Raza Sharma DO General (Family Medicine) Phone: [phone]  Fax: [phone] [address] [facility] [address] 6/22/2024 - -              Do you have any home O2? No

## 2024-09-25 NOTE — CARE PLAN
The patient's goals for the shift include      The clinical goals for the shift include Decrease in pain throughout the shift to a tolerable pain level      Problem: Skin  Goal: Decreased wound size/increased tissue granulation at next dressing change  Outcome: Progressing     Problem: Pain - Adult  Goal: Verbalizes/displays adequate comfort level or baseline comfort level  Outcome: Progressing     Problem: Safety - Adult  Goal: Free from fall injury  Outcome: Progressing

## 2024-09-25 NOTE — CONSULTS
Nutrition Note:   Nutrition Assessment      Reason for Assessment: Admission nursing screening (MST=2; Unintentional weight loss)    Patient is a 64 y.o. male with h/o diverticulitis with perforation & abscess s/p colostomy placement. Now presents for OR (9/24) for colostomy closure, flex sig, appendectomy, cystoscopy, & bilateral ureteral catheter placement.    Remains NPO w/ NGT in place at time of visit. Awaiting ROBF prior to diet advancement.     Visit made, pt resting in bed w/ wife at bedside. Wife reports pt with great appetite & PO intake prior to admission. Notes weight has been stable. Denies any nutritional concerns prior to surgery.    Will defer MST=2 at this time. No nutritional intervention indicated, as pt just awaiting ROBF prior to diet initiation & had been doing well pre-op. Please reconsult should further nutritional intervention be warranted.    Wt Readings from Last 30 Encounters:   09/23/24 76.3 kg (168 lb 3.4 oz) --> Pre-op weight   08/12/24 76 kg (167 lb 9.6 oz)   06/25/24 74.8 kg (165 lb)   04/03/23 81.8 kg (180 lb 6.4 oz)       Dietary Orders (From admission, onward)       Start     Ordered    09/25/24 1207  NPO Diet Except: Ice chips; Effective now  Diet effective now        Question:  Except:  Answer:  Ice chips    09/25/24 1206               Time Spent (min): 30 minutes

## 2024-09-25 NOTE — PROGRESS NOTES
Jatinder Keenan is a 64 y.o. male on day 1 of admission presenting with Colostomy status (Multi).    Subjective   Pt is post op day 1 from colostomy closure, cystoscopy with Pineda placement on 9/24/24 with Kenia Nunn and Tabitha Spencer and Zurdo assisting.     Overnight pt c/o pain in abd that kept him awake. Pain is in abd across abd. No n/v. No CP, SOB, dizziness. No heartburn sx.   C/o NG tube uncomfortable.  Has not done much ambulating/moving around.     Last cigarette a few days ago. States does not want a cigarette now and does not want any nicotine replacement therapies such as patch or gum. Thinks he will  smoke when he goes home, especially in the mornings with his coffee.     Objective     Physical Exam  Vitals reviewed.   Constitutional:       General: He is not in acute distress.     Appearance: Normal appearance. He is not ill-appearing, toxic-appearing or diaphoretic.   HENT:      Head: Normocephalic and atraumatic.      Nose:      Comments: NG tube in place with brownish output.  Eyes:      General: No scleral icterus.        Right eye: No discharge.         Left eye: No discharge.      Conjunctiva/sclera: Conjunctivae normal.   Cardiovascular:      Rate and Rhythm: Normal rate and regular rhythm.      Pulses: Normal pulses.      Heart sounds: Normal heart sounds. No murmur heard.     No friction rub. No gallop.   Pulmonary:      Effort: Pulmonary effort is normal. No respiratory distress.      Breath sounds: Normal breath sounds. No stridor. No wheezing, rhonchi or rales.   Chest:      Chest wall: No tenderness.   Abdominal:      Palpations: Abdomen is soft.      Tenderness: There is abdominal tenderness.      Comments: Right abd with RADHA drain with scant serosanguinous output.   Midline abd surgical incision with surgical dressing in place. Some bloody output on packing. Skin around surgical incision is soft, no swelling or erythema. No BS noted this am.   Genitourinary:     Comments: Pineda in  "place with dark urine output.  Musculoskeletal:      Right lower leg: No edema.      Left lower leg: No edema.   Skin:     General: Skin is warm and dry.   Neurological:      Mental Status: He is alert and oriented to person, place, and time.      Motor: Weakness (generalized) present.      Gait: Gait normal.   Psychiatric:         Mood and Affect: Mood normal.         Behavior: Behavior normal.         Thought Content: Thought content normal.         Judgment: Judgment normal.         Last Recorded Vitals  Blood pressure 122/68, pulse 71, temperature 36.6 °C (97.8 °F), resp. rate 16, height 1.702 m (5' 7\"), weight 82.6 kg (182 lb 1.6 oz), SpO2 95%.  At time of exam HR 69 and pulse ox 96%.    Intake/Output last 3 Shifts:  I/O last 3 completed shifts:  In: 2043.3 (24.7 mL/kg) [I.V.:1693.3 (20.5 mL/kg); IV Piggyback:350]  Out: 1435 (17.4 mL/kg) [Urine:915 (0.3 mL/kg/hr); Emesis/NG output:475; Drains:45]  Weight: 82.6 kg     Relevant Results    Colonoscopy Screening; Average Risk Patient    Result Date: 9/23/2024  Table formatting from the original result was not included. Impression The ileocecal valve and cecum appeared normal. Abnormal mucosa Findings The ileocecal valve and cecum appeared normal. Edematous mucosa in the cecum Atrophic and erythematous mucosa in the rectum. Rectal stump 15 cm;  Recommendation Await pathology results Repeat screening colonoscopy in 10 years, due: 9/21/2034 Indication Personal history of colonic polyps Staff Staff Role Pallavi Nunn MD Proceduralist Medications See Anesthesia Record. Preprocedure A history and physical has been performed, and patient medication allergies have been reviewed. The patient's tolerance of previous anesthesia has been reviewed. The risks and benefits of the procedure and the sedation options and risks were discussed with the patient. All questions were answered and informed consent obtained. Details of the Procedure The patient underwent monitored " anesthesia care, which was administered by an anesthesia professional. The patient's blood pressure, ECG, ETCO2, heart rate, level of consciousness, oxygen and respirations were monitored throughout the procedure. A digital rectal exam was performed. The scope was introduced through the anus and stoma and advanced to the cecum. Retroflexion was performed in the rectum. The quality of bowel preparation was evaluated using the [place] Bowel Preparation Scale with scores of: right colon = 3, transverse colon = 3, left colon = 3. The total BBPS score was 9. Bowel prep was adequate. The patient experienced no blood loss. The procedure was not difficult. The patient tolerated the procedure well. There were no apparent adverse events. Events Procedure Events Event Event Time ENDO SCOPE IN TIME 9/23/2024  7:32 AM ENDO CECUM REACHED 9/23/2024  7:36 AM Specimens ID Type Source Tests Collected by Time 1 : APPENDICEAL ORFICE ABNORMAL MUCOSA Tissue COLON - CECUM BIOPSY SURGICAL PATHOLOGY EXAM Pallavi Nunn MD 9/23/2024 0738 Procedure Location [facility] [address] [place] [id number] [phone] Referring Provider Pallavi Nunn MD Procedure Provider MD Pallavi Hurtado     Scheduled medications  ceFAZolin, 1 g, intravenous, q8h  metroNIDAZOLE, 500 mg, intravenous, q8h  pantoprazole, 40 mg, oral, Daily before breakfast   Or  pantoprazole, 40 mg, intravenous, Daily before breakfast      Continuous medications  lactated Ringer's, 20 mL/hr, Last Rate: 20 mL/hr (09/24/24 1743)  lactated Ringer's, 100 mL/hr, Last Rate: Stopped (09/24/24 1408)  lactated Ringer's, 100 mL/hr, Last Rate: 100 mL/hr (09/24/24 5358)      PRN medications  PRN medications: morphine **OR** morphine, ondansetron **OR** ondansetron, phenoL  Results for orders placed or performed during the hospital encounter of 09/24/24 (from the past 24 hour(s))   POCT GLUCOSE   Result Value Ref Range    POCT Glucose  101 (H) 74 - 99 mg/dL   POCT GLUCOSE   Result Value Ref Range    POCT Glucose 115 (H) 74 - 99 mg/dL   Comprehensive Metabolic Panel   Result Value Ref Range    Glucose 106 (H) 74 - 99 mg/dL    Sodium 138 136 - 145 mmol/L    Potassium 4.1 3.5 - 5.3 mmol/L    Chloride 103 98 - 107 mmol/L    Bicarbonate 28 21 - 32 mmol/L    Anion Gap 11 10 - 20 mmol/L    Urea Nitrogen 15 6 - 23 mg/dL    Creatinine 0.89 0.50 - 1.30 mg/dL    eGFR >90 >60 mL/min/1.73m*2    Calcium 8.3 (L) 8.6 - 10.3 mg/dL    Albumin 3.6 3.4 - 5.0 g/dL    Alkaline Phosphatase 35 33 - 136 U/L    Total Protein 5.9 (L) 6.4 - 8.2 g/dL    AST 14 9 - 39 U/L    Bilirubin, Total 0.7 0.0 - 1.2 mg/dL    ALT 14 10 - 52 U/L   CBC and Auto Differential   Result Value Ref Range    WBC 10.2 4.4 - 11.3 x10*3/uL    nRBC 0.0 0.0 - 0.0 /100 WBCs    RBC 4.48 (L) 4.50 - 5.90 x10*6/uL    Hemoglobin 14.8 13.5 - 17.5 g/dL    Hematocrit 43.8 41.0 - 52.0 %    MCV 98 80 - 100 fL    MCH 33.0 26.0 - 34.0 pg    MCHC 33.8 32.0 - 36.0 g/dL    RDW 12.3 11.5 - 14.5 %    Platelets 203 150 - 450 x10*3/uL    Neutrophils % 74.5 40.0 - 80.0 %    Immature Granulocytes %, Automated 0.4 0.0 - 0.9 %    Lymphocytes % 14.8 13.0 - 44.0 %    Monocytes % 9.9 2.0 - 10.0 %    Eosinophils % 0.1 0.0 - 6.0 %    Basophils % 0.3 0.0 - 2.0 %    Neutrophils Absolute 7.61 1.20 - 7.70 x10*3/uL    Immature Granulocytes Absolute, Automated 0.04 0.00 - 0.70 x10*3/uL    Lymphocytes Absolute 1.51 1.20 - 4.80 x10*3/uL    Monocytes Absolute 1.01 (H) 0.10 - 1.00 x10*3/uL    Eosinophils Absolute 0.01 0.00 - 0.70 x10*3/uL    Basophils Absolute 0.03 0.00 - 0.10 x10*3/uL                            Assessment/Plan   Assessment & Plan  Colostomy status (Multi)    Pt is post op day 1 coloscopy closure with Dr. Nunn on 9/24/24.  - imaging reviewed.  - labs reviewed. No anemia. Kidney function stable. Cont to monitor labs.   - Decrease urine out put overnight with darker urine noted this am. Pineda output 110 ml since 0400 when  last emptied.   - Cont to monitor strict Is/Os.  - 500 ml NS bolus ordered.  - ancef for 3 doses, due for 1 more dose today.  - NPO with ice chips.  - NG to LIWS to stay in place until return of bowel function and passing gas.  - Chloraseptic throat spray ordered.  - recommend changing pain medicine to dilaudid, medicine ordered.  - zofran PRN as ordered.   - grey to stay in place at least until patient up walking, expect may be able to remove in a couple days.  - RADHA drain with 45 ml in last 24 hours. Cont to monitor output.  - activity as tolerated, enc pt to get up to chair or sit on edge of bed with assistance.  - lovenox after 24 hours from surgery, may start 40 mg subcutaneous daily for DVT prophylaxis.  - SCDs while in bed.  - medicine consulted for medical management.      Tobacco use disorder  - enc pt to not use tobacco products.  - time spent counseling 3 minutes.          I spent 35 minutes in the professional and overall care of this patient.    Dr. Nunn updated on plan of care and also in to see pt.    Pebbles Webster, APRCHRISTY-CNP

## 2024-09-26 LAB
ALBUMIN SERPL BCP-MCNC: 3.5 G/DL (ref 3.4–5)
ALP SERPL-CCNC: 34 U/L (ref 33–136)
ALT SERPL W P-5'-P-CCNC: 9 U/L (ref 10–52)
ANION GAP SERPL CALC-SCNC: 10 MMOL/L (ref 10–20)
AST SERPL W P-5'-P-CCNC: 11 U/L (ref 9–39)
BASOPHILS # BLD AUTO: 0.03 X10*3/UL (ref 0–0.1)
BASOPHILS NFR BLD AUTO: 0.3 %
BILIRUB SERPL-MCNC: 0.7 MG/DL (ref 0–1.2)
BUN SERPL-MCNC: 11 MG/DL (ref 6–23)
CALCIUM SERPL-MCNC: 8.6 MG/DL (ref 8.6–10.3)
CHLORIDE SERPL-SCNC: 100 MMOL/L (ref 98–107)
CO2 SERPL-SCNC: 32 MMOL/L (ref 21–32)
CREAT SERPL-MCNC: 0.75 MG/DL (ref 0.5–1.3)
EGFRCR SERPLBLD CKD-EPI 2021: >90 ML/MIN/1.73M*2
EOSINOPHIL # BLD AUTO: 0.14 X10*3/UL (ref 0–0.7)
EOSINOPHIL NFR BLD AUTO: 1.6 %
ERYTHROCYTE [DISTWIDTH] IN BLOOD BY AUTOMATED COUNT: 12 % (ref 11.5–14.5)
GLUCOSE SERPL-MCNC: 128 MG/DL (ref 74–99)
HCT VFR BLD AUTO: 42 % (ref 41–52)
HGB BLD-MCNC: 14.5 G/DL (ref 13.5–17.5)
IMM GRANULOCYTES # BLD AUTO: 0.03 X10*3/UL (ref 0–0.7)
IMM GRANULOCYTES NFR BLD AUTO: 0.3 % (ref 0–0.9)
LABORATORY COMMENT REPORT: NORMAL
LYMPHOCYTES # BLD AUTO: 1.77 X10*3/UL (ref 1.2–4.8)
LYMPHOCYTES NFR BLD AUTO: 19.8 %
MCH RBC QN AUTO: 33.3 PG (ref 26–34)
MCHC RBC AUTO-ENTMCNC: 34.5 G/DL (ref 32–36)
MCV RBC AUTO: 97 FL (ref 80–100)
MONOCYTES # BLD AUTO: 0.97 X10*3/UL (ref 0.1–1)
MONOCYTES NFR BLD AUTO: 10.9 %
NEUTROPHILS # BLD AUTO: 6 X10*3/UL (ref 1.2–7.7)
NEUTROPHILS NFR BLD AUTO: 67.1 %
NRBC BLD-RTO: 0 /100 WBCS (ref 0–0)
PATH REPORT.FINAL DX SPEC: NORMAL
PATH REPORT.GROSS SPEC: NORMAL
PATH REPORT.RELEVANT HX SPEC: NORMAL
PATH REPORT.TOTAL CANCER: NORMAL
PLATELET # BLD AUTO: 186 X10*3/UL (ref 150–450)
POTASSIUM SERPL-SCNC: 3.8 MMOL/L (ref 3.5–5.3)
PROT SERPL-MCNC: 5.9 G/DL (ref 6.4–8.2)
RBC # BLD AUTO: 4.35 X10*6/UL (ref 4.5–5.9)
RESIDENT REVIEW: NORMAL
SODIUM SERPL-SCNC: 138 MMOL/L (ref 136–145)
WBC # BLD AUTO: 8.9 X10*3/UL (ref 4.4–11.3)

## 2024-09-26 PROCEDURE — 2500000004 HC RX 250 GENERAL PHARMACY W/ HCPCS (ALT 636 FOR OP/ED): Performed by: INTERNAL MEDICINE

## 2024-09-26 PROCEDURE — 99232 SBSQ HOSP IP/OBS MODERATE 35: CPT | Performed by: NURSE PRACTITIONER

## 2024-09-26 PROCEDURE — 2500000004 HC RX 250 GENERAL PHARMACY W/ HCPCS (ALT 636 FOR OP/ED): Performed by: NURSE PRACTITIONER

## 2024-09-26 PROCEDURE — 2500000001 HC RX 250 WO HCPCS SELF ADMINISTERED DRUGS (ALT 637 FOR MEDICARE OP): Performed by: NURSE PRACTITIONER

## 2024-09-26 PROCEDURE — 99233 SBSQ HOSP IP/OBS HIGH 50: CPT | Performed by: NURSE PRACTITIONER

## 2024-09-26 PROCEDURE — 36415 COLL VENOUS BLD VENIPUNCTURE: CPT | Performed by: STUDENT IN AN ORGANIZED HEALTH CARE EDUCATION/TRAINING PROGRAM

## 2024-09-26 PROCEDURE — 2500000002 HC RX 250 W HCPCS SELF ADMINISTERED DRUGS (ALT 637 FOR MEDICARE OP, ALT 636 FOR OP/ED): Performed by: NURSE PRACTITIONER

## 2024-09-26 PROCEDURE — 1200000002 HC GENERAL ROOM WITH TELEMETRY DAILY

## 2024-09-26 PROCEDURE — 85025 COMPLETE CBC W/AUTO DIFF WBC: CPT | Performed by: STUDENT IN AN ORGANIZED HEALTH CARE EDUCATION/TRAINING PROGRAM

## 2024-09-26 PROCEDURE — 2500000004 HC RX 250 GENERAL PHARMACY W/ HCPCS (ALT 636 FOR OP/ED): Performed by: SURGERY

## 2024-09-26 PROCEDURE — 84075 ASSAY ALKALINE PHOSPHATASE: CPT | Performed by: STUDENT IN AN ORGANIZED HEALTH CARE EDUCATION/TRAINING PROGRAM

## 2024-09-26 RX ADMIN — HYDROMORPHONE HYDROCHLORIDE 1 MG: 1 INJECTION, SOLUTION INTRAMUSCULAR; INTRAVENOUS; SUBCUTANEOUS at 14:58

## 2024-09-26 RX ADMIN — HYDROMORPHONE HYDROCHLORIDE 0.2 MG: 1 INJECTION, SOLUTION INTRAMUSCULAR; INTRAVENOUS; SUBCUTANEOUS at 05:53

## 2024-09-26 RX ADMIN — HYDROMORPHONE HYDROCHLORIDE 1 MG: 1 INJECTION, SOLUTION INTRAMUSCULAR; INTRAVENOUS; SUBCUTANEOUS at 23:10

## 2024-09-26 RX ADMIN — ROSUVASTATIN CALCIUM 20 MG: 20 TABLET, FILM COATED ORAL at 08:33

## 2024-09-26 RX ADMIN — POTASSIUM CHLORIDE, DEXTROSE MONOHYDRATE AND SODIUM CHLORIDE 100 ML/HR: 150; 5; 450 INJECTION, SOLUTION INTRAVENOUS at 08:13

## 2024-09-26 RX ADMIN — HYDROMORPHONE HYDROCHLORIDE 1 MG: 1 INJECTION, SOLUTION INTRAMUSCULAR; INTRAVENOUS; SUBCUTANEOUS at 02:21

## 2024-09-26 RX ADMIN — EZETIMIBE 10 MG: 10 TABLET ORAL at 08:33

## 2024-09-26 RX ADMIN — ASPIRIN 81 MG: 81 TABLET, COATED ORAL at 08:33

## 2024-09-26 RX ADMIN — METOPROLOL SUCCINATE 25 MG: 25 TABLET, EXTENDED RELEASE ORAL at 08:33

## 2024-09-26 RX ADMIN — POTASSIUM CHLORIDE, DEXTROSE MONOHYDRATE AND SODIUM CHLORIDE 100 ML/HR: 150; 5; 450 INJECTION, SOLUTION INTRAVENOUS at 17:25

## 2024-09-26 RX ADMIN — PANTOPRAZOLE SODIUM 40 MG: 40 INJECTION, POWDER, FOR SOLUTION INTRAVENOUS at 05:28

## 2024-09-26 RX ADMIN — HYDROMORPHONE HYDROCHLORIDE 1 MG: 1 INJECTION, SOLUTION INTRAMUSCULAR; INTRAVENOUS; SUBCUTANEOUS at 10:59

## 2024-09-26 RX ADMIN — HYDROMORPHONE HYDROCHLORIDE 1 MG: 1 INJECTION, SOLUTION INTRAMUSCULAR; INTRAVENOUS; SUBCUTANEOUS at 07:20

## 2024-09-26 RX ADMIN — HYDROMORPHONE HYDROCHLORIDE 1 MG: 1 INJECTION, SOLUTION INTRAMUSCULAR; INTRAVENOUS; SUBCUTANEOUS at 19:09

## 2024-09-26 RX ADMIN — ENOXAPARIN SODIUM 40 MG: 40 INJECTION SUBCUTANEOUS at 08:33

## 2024-09-26 SDOH — SOCIAL STABILITY: SOCIAL INSECURITY: WITHIN THE LAST YEAR, HAVE YOU BEEN AFRAID OF YOUR PARTNER OR EX-PARTNER?: NO

## 2024-09-26 SDOH — ECONOMIC STABILITY: TRANSPORTATION INSECURITY
IN THE PAST 12 MONTHS, HAS LACK OF TRANSPORTATION KEPT YOU FROM MEETINGS, WORK, OR FROM GETTING THINGS NEEDED FOR DAILY LIVING?: NO

## 2024-09-26 SDOH — ECONOMIC STABILITY: FOOD INSECURITY: WITHIN THE PAST 12 MONTHS, THE FOOD YOU BOUGHT JUST DIDN'T LAST AND YOU DIDN'T HAVE MONEY TO GET MORE.: PATIENT DECLINED

## 2024-09-26 SDOH — SOCIAL STABILITY: SOCIAL INSECURITY
WITHIN THE LAST YEAR, HAVE TO BEEN RAPED OR FORCED TO HAVE ANY KIND OF SEXUAL ACTIVITY BY YOUR PARTNER OR EX-PARTNER?: NO

## 2024-09-26 SDOH — SOCIAL STABILITY: SOCIAL INSECURITY
WITHIN THE LAST YEAR, HAVE YOU BEEN KICKED, HIT, SLAPPED, OR OTHERWISE PHYSICALLY HURT BY YOUR PARTNER OR EX-PARTNER?: NO

## 2024-09-26 SDOH — ECONOMIC STABILITY: HOUSING INSECURITY: IN THE LAST 12 MONTHS, WAS THERE A TIME WHEN YOU WERE NOT ABLE TO PAY THE MORTGAGE OR RENT ON TIME?: NO

## 2024-09-26 SDOH — ECONOMIC STABILITY: INCOME INSECURITY: HOW HARD IS IT FOR YOU TO PAY FOR THE VERY BASICS LIKE FOOD, HOUSING, MEDICAL CARE, AND HEATING?: NOT VERY HARD

## 2024-09-26 SDOH — ECONOMIC STABILITY: FOOD INSECURITY: HOW HARD IS IT FOR YOU TO PAY FOR THE VERY BASICS LIKE FOOD, HOUSING, MEDICAL CARE, AND HEATING?: NOT VERY HARD

## 2024-09-26 SDOH — ECONOMIC STABILITY: HOUSING INSECURITY: IN THE PAST 12 MONTHS, HOW MANY TIMES HAVE YOU MOVED WHERE YOU WERE LIVING?: 1

## 2024-09-26 SDOH — ECONOMIC STABILITY: TRANSPORTATION INSECURITY
IN THE PAST 12 MONTHS, HAS THE LACK OF TRANSPORTATION KEPT YOU FROM MEDICAL APPOINTMENTS OR FROM GETTING MEDICATIONS?: NO

## 2024-09-26 SDOH — ECONOMIC STABILITY: FOOD INSECURITY
WITHIN THE PAST 12 MONTHS, YOU WORRIED THAT YOUR FOOD WOULD RUN OUT BEFORE YOU GOT THE MONEY TO BUY MORE.: PATIENT DECLINED

## 2024-09-26 SDOH — SOCIAL STABILITY: SOCIAL INSECURITY: WITHIN THE LAST YEAR, HAVE YOU BEEN HUMILIATED OR EMOTIONALLY ABUSED IN OTHER WAYS BY YOUR PARTNER OR EX-PARTNER?: NO

## 2024-09-26 SDOH — ECONOMIC STABILITY: INCOME INSECURITY
IN THE PAST 12 MONTHS HAS THE ELECTRIC, GAS, OIL, OR WATER COMPANY THREATENED TO SHUT OFF SERVICES IN YOUR HOME?: PATIENT DECLINED

## 2024-09-26 SDOH — ECONOMIC STABILITY: TRANSPORTATION INSECURITY: IN THE PAST 12 MONTHS, HAS LACK OF TRANSPORTATION KEPT YOU FROM MEDICAL APPOINTMENTS OR FROM GETTING MEDICATIONS?: NO

## 2024-09-26 SDOH — SOCIAL STABILITY: SOCIAL INSECURITY
WITHIN THE LAST YEAR, HAVE YOU BEEN RAPED OR FORCED TO HAVE ANY KIND OF SEXUAL ACTIVITY BY YOUR PARTNER OR EX-PARTNER?: NO

## 2024-09-26 SDOH — ECONOMIC STABILITY: INCOME INSECURITY: IN THE LAST 12 MONTHS, WAS THERE A TIME WHEN YOU WERE NOT ABLE TO PAY THE MORTGAGE OR RENT ON TIME?: NO

## 2024-09-26 SDOH — ECONOMIC STABILITY: HOUSING INSECURITY: AT ANY TIME IN THE PAST 12 MONTHS, WERE YOU HOMELESS OR LIVING IN A SHELTER (INCLUDING NOW)?: NO

## 2024-09-26 SDOH — ECONOMIC STABILITY: INCOME INSECURITY
IN THE PAST 12 MONTHS, HAS THE ELECTRIC, GAS, OIL, OR WATER COMPANY THREATENED TO SHUT OFF SERVICE IN YOUR HOME?: PATIENT DECLINED

## 2024-09-26 SDOH — ECONOMIC STABILITY: FOOD INSECURITY: WITHIN THE PAST 12 MONTHS, YOU WORRIED THAT YOUR FOOD WOULD RUN OUT BEFORE YOU GOT MONEY TO BUY MORE.: PATIENT DECLINED

## 2024-09-26 ASSESSMENT — COGNITIVE AND FUNCTIONAL STATUS - GENERAL
MOVING TO AND FROM BED TO CHAIR: A LITTLE
TURNING FROM BACK TO SIDE WHILE IN FLAT BAD: A LITTLE
STANDING UP FROM CHAIR USING ARMS: A LITTLE
MOVING TO AND FROM BED TO CHAIR: A LITTLE
DRESSING REGULAR LOWER BODY CLOTHING: A LITTLE
DAILY ACTIVITIY SCORE: 20
TOILETING: A LITTLE
DRESSING REGULAR UPPER BODY CLOTHING: A LITTLE
HELP NEEDED FOR BATHING: A LITTLE
WALKING IN HOSPITAL ROOM: A LITTLE
WALKING IN HOSPITAL ROOM: A LITTLE
DRESSING REGULAR LOWER BODY CLOTHING: A LITTLE
CLIMB 3 TO 5 STEPS WITH RAILING: A LITTLE
DRESSING REGULAR UPPER BODY CLOTHING: A LITTLE
TURNING FROM BACK TO SIDE WHILE IN FLAT BAD: A LITTLE
MOVING FROM LYING ON BACK TO SITTING ON SIDE OF FLAT BED WITH BEDRAILS: A LITTLE
MOBILITY SCORE: 18
MOBILITY SCORE: 18
DAILY ACTIVITIY SCORE: 20
CLIMB 3 TO 5 STEPS WITH RAILING: A LITTLE
STANDING UP FROM CHAIR USING ARMS: A LITTLE
MOVING FROM LYING ON BACK TO SITTING ON SIDE OF FLAT BED WITH BEDRAILS: A LITTLE
TOILETING: A LITTLE
HELP NEEDED FOR BATHING: A LITTLE

## 2024-09-26 ASSESSMENT — PAIN SCALES - GENERAL
PAINLEVEL_OUTOF10: 4
PAINLEVEL_OUTOF10: 0 - NO PAIN
PAINLEVEL_OUTOF10: 4
PAINLEVEL_OUTOF10: 7
PAINLEVEL_OUTOF10: 7
PAINLEVEL_OUTOF10: 8
PAINLEVEL_OUTOF10: 7
PAINLEVEL_OUTOF10: 7
PAINLEVEL_OUTOF10: 10 - WORST POSSIBLE PAIN
PAINLEVEL_OUTOF10: 8
PAINLEVEL_OUTOF10: 5 - MODERATE PAIN
PAINLEVEL_OUTOF10: 3

## 2024-09-26 ASSESSMENT — PAIN DESCRIPTION - DESCRIPTORS: DESCRIPTORS: ACHING

## 2024-09-26 ASSESSMENT — PAIN - FUNCTIONAL ASSESSMENT
PAIN_FUNCTIONAL_ASSESSMENT: 0-10

## 2024-09-26 ASSESSMENT — PAIN DESCRIPTION - LOCATION
LOCATION: ABDOMEN

## 2024-09-26 ASSESSMENT — PAIN DESCRIPTION - ORIENTATION: ORIENTATION: LEFT

## 2024-09-26 ASSESSMENT — ACTIVITIES OF DAILY LIVING (ADL): LACK_OF_TRANSPORTATION: NO

## 2024-09-26 NOTE — PROGRESS NOTES
Jatinder Keenan is a 64 y.o. male on day 2 of admission presenting with No Principal Problem: There is no principal problem currently on the Problem List. Please update the Problem List and refresh..    Subjective   Pt is post op day 2 from colostomy closure, cystoscopy with Pineda placement on 9/24/24 with Kenia Nunn and Tabitha Spencer and Zurdo assisting.     No acute overnight events.   Pain is better controlled with current pain regimen.   Denies and bloating, just c/o aches and tenderness.     No n/v. No heartburn.   NG tube uncomfortable but chloraseptic spray helps.   Tolerating ice chips.   Feeling that gas is moving in GI tract but has not passed gas yet.   Has gotten up and walked in room.     No CP, SOB, HA, light headed.     Does not want a cigarette or nicotine replacement. Now says that he does not want to resume smoking and he is motivated to stay not smoking.        Objective     Physical Exam  Vitals reviewed.   Constitutional:       General: He is not in acute distress.     Appearance: Normal appearance. He is not ill-appearing, toxic-appearing or diaphoretic.   HENT:      Head: Normocephalic and atraumatic.      Nose:      Comments: Left nares with NG tube in place, brownish/red drainage.     Mouth/Throat:      Mouth: Mucous membranes are moist.   Eyes:      General: No scleral icterus.        Right eye: No discharge.         Left eye: No discharge.      Conjunctiva/sclera: Conjunctivae normal.   Cardiovascular:      Rate and Rhythm: Normal rate and regular rhythm.      Pulses: Normal pulses.      Heart sounds: Normal heart sounds. No murmur heard.     No friction rub. No gallop.   Pulmonary:      Effort: Pulmonary effort is normal. No respiratory distress.      Breath sounds: Normal breath sounds. No stridor. No wheezing, rhonchi or rales.   Chest:      Chest wall: No tenderness.   Abdominal:      Tenderness: There is abdominal tenderness.      Comments: Hypoactive BS x 4 q.   Tender abd  "throughout.   Right abd with RADHA drain with serosanguinous drainage.  Midline abd vertical surgical incision closed with glue. No bleeding, drainage, bruising, swelling, erythema. Well approximated.   Left abd with horizontal surgical incision, dressing changed. Old packing removed, bloody drainage. Packed with NS moistened mesalt and covered with dry sterile dressing. No surrounding erythema, swelling.      Genitourinary:     Comments: Pineda with yellow urine. No blood or sediment.   Musculoskeletal:      Right lower leg: No edema.      Left lower leg: No edema.   Skin:     General: Skin is warm and dry.   Neurological:      Mental Status: He is alert and oriented to person, place, and time.      Motor: Weakness (generalized) present.      Gait: Gait normal.   Psychiatric:         Mood and Affect: Mood normal.         Behavior: Behavior normal.         Thought Content: Thought content normal.         Judgment: Judgment normal.         Last Recorded Vitals  Blood pressure 162/73, pulse 81, temperature 36.6 °C (97.8 °F), resp. rate 18, height 1.702 m (5' 7\"), weight 82.6 kg (182 lb 1.6 oz), SpO2 94%.  Intake/Output last 3 Shifts:  I/O last 3 completed shifts:  In: 1588.3 (19.2 mL/kg) [IV Piggyback:1588.3]  Out: 3439 (41.6 mL/kg) [Urine:1450 (0.5 mL/kg/hr); Emesis/NG output:1950; Drains:39]  Weight: 82.6 kg     Relevant Results    Colonoscopy Screening; Average Risk Patient    Result Date: 9/23/2024  Table formatting from the original result was not included. Impression The ileocecal valve and cecum appeared normal. Abnormal mucosa Findings The ileocecal valve and cecum appeared normal. Edematous mucosa in the cecum Atrophic and erythematous mucosa in the rectum. Rectal stump 15 cm;  Recommendation Await pathology results Repeat screening colonoscopy in 10 years, due: 9/21/2034 Indication Personal history of colonic polyps Staff Staff Role Pallavi Nunn MD Proceduralist Medications See Anesthesia Record. Preprocedure " A history and physical has been performed, and patient medication allergies have been reviewed. The patient's tolerance of previous anesthesia has been reviewed. The risks and benefits of the procedure and the sedation options and risks were discussed with the patient. All questions were answered and informed consent obtained. Details of the Procedure The patient underwent monitored anesthesia care, which was administered by an anesthesia professional. The patient's blood pressure, ECG, ETCO2, heart rate, level of consciousness, oxygen and respirations were monitored throughout the procedure. A digital rectal exam was performed. The scope was introduced through the anus and stoma and advanced to the cecum. Retroflexion was performed in the rectum. The quality of bowel preparation was evaluated using the [place] Bowel Preparation Scale with scores of: right colon = 3, transverse colon = 3, left colon = 3. The total BBPS score was 9. Bowel prep was adequate. The patient experienced no blood loss. The procedure was not difficult. The patient tolerated the procedure well. There were no apparent adverse events. Events Procedure Events Event Event Time ENDO SCOPE IN TIME 9/23/2024  7:32 AM ENDO CECUM REACHED 9/23/2024  7:36 AM Specimens ID Type Source Tests Collected by Time 1 : APPENDICEAL ORFICE ABNORMAL MUCOSA Tissue COLON - CECUM BIOPSY SURGICAL PATHOLOGY EXAM Pallavi Nunn MD 9/23/2024 0738 Procedure Location [facility] [place] [address] [place] [id number] [phone] Referring Provider Pallavi Nunn MD Procedure Provider MD Pallavi Hurtado     Scheduled medications  aspirin, 81 mg, oral, Daily  enoxaparin, 40 mg, subcutaneous, Daily  ezetimibe, 10 mg, oral, Daily  metoprolol succinate XL, 25 mg, oral, Daily  pantoprazole, 40 mg, oral, Daily before breakfast   Or  pantoprazole, 40 mg, intravenous, Daily before breakfast  rosuvastatin, 20 mg, oral,  Daily      Continuous medications  potassium [id number]-B0-1.45%NaCl, 100 mL/hr, Last Rate: 100 mL/hr (09/26/24 0347)      PRN medications  PRN medications: HYDROmorphone, HYDROmorphone, HYDROmorphone, ondansetron **OR** ondansetron, phenoL  Results for orders placed or performed during the hospital encounter of 09/24/24 (from the past 24 hour(s))   POCT GLUCOSE   Result Value Ref Range    POCT Glucose 109 (H) 74 - 99 mg/dL   POCT GLUCOSE   Result Value Ref Range    POCT Glucose 107 (H) 74 - 99 mg/dL   POCT GLUCOSE   Result Value Ref Range    POCT Glucose 141 (H) 74 - 99 mg/dL   POCT GLUCOSE   Result Value Ref Range    POCT Glucose 129 (H) 74 - 99 mg/dL   Comprehensive Metabolic Panel   Result Value Ref Range    Glucose 128 (H) 74 - 99 mg/dL    Sodium 138 136 - 145 mmol/L    Potassium 3.8 3.5 - 5.3 mmol/L    Chloride 100 98 - 107 mmol/L    Bicarbonate 32 21 - 32 mmol/L    Anion Gap 10 10 - 20 mmol/L    Urea Nitrogen 11 6 - 23 mg/dL    Creatinine 0.75 0.50 - 1.30 mg/dL    eGFR >90 >60 mL/min/1.73m*2    Calcium 8.6 8.6 - 10.3 mg/dL    Albumin 3.5 3.4 - 5.0 g/dL    Alkaline Phosphatase 34 33 - 136 U/L    Total Protein 5.9 (L) 6.4 - 8.2 g/dL    AST 11 9 - 39 U/L    Bilirubin, Total 0.7 0.0 - 1.2 mg/dL    ALT 9 (L) 10 - 52 U/L   CBC and Auto Differential   Result Value Ref Range    WBC 8.9 4.4 - 11.3 x10*3/uL    nRBC 0.0 0.0 - 0.0 /100 WBCs    RBC 4.35 (L) 4.50 - 5.90 x10*6/uL    Hemoglobin 14.5 13.5 - 17.5 g/dL    Hematocrit 42.0 41.0 - 52.0 %    MCV 97 80 - 100 fL    MCH 33.3 26.0 - 34.0 pg    MCHC 34.5 32.0 - 36.0 g/dL    RDW 12.0 11.5 - 14.5 %    Platelets 186 150 - 450 x10*3/uL    Neutrophils % 67.1 40.0 - 80.0 %    Immature Granulocytes %, Automated 0.3 0.0 - 0.9 %    Lymphocytes % 19.8 13.0 - 44.0 %    Monocytes % 10.9 2.0 - 10.0 %    Eosinophils % 1.6 0.0 - 6.0 %    Basophils % 0.3 0.0 - 2.0 %    Neutrophils Absolute 6.00 1.20 - 7.70 x10*3/uL    Immature Granulocytes Absolute, Automated 0.03 0.00 - 0.70 x10*3/uL     Lymphocytes Absolute 1.77 1.20 - 4.80 x10*3/uL    Monocytes Absolute 0.97 0.10 - 1.00 x10*3/uL    Eosinophils Absolute 0.14 0.00 - 0.70 x10*3/uL    Basophils Absolute 0.03 0.00 - 0.10 x10*3/uL                            Assessment/Plan   Assessment & Plan    Pt is post op day 2 coloscopy closure with Dr. Nunn on 9/24/24     - imaging reviewed.  - labs reviewed. No significant anemia. Kidney function stable. Cont to monitor labs.   - urine output is improved and urine color is improved from yesterday.   - Cont to monitor strict Is/Os.  - ancef completed.  - NPO with ice chips.  - D5 1/2 NS with 20 MEq Kcl at 100 ml / hour.  - NG to LIWS to stay in place until return of bowel function and passing gas. Hopeful that will be able to remove tomorrow.  - cont PPI.  - Chloraseptic throat spray ordered. May keep this in the room for pt to use.   - pain better controlled with dilaudid, cont as ordered.   - zofran PRN as ordered.   - grey to stay in place at least until patient up walking, may be able to remove tomorrow.  - RADHA drain with 19 ml in last 24 hours. Cont to monitor output.  - activity as tolerated, enc pt to get up to walk, sit in chair, or sit on edge of bed with assistance.  - lovenox 40 mg subcutaneous daily for DVT prophylaxis.  - SCDs while in bed.  - left abd surgical dressing change completed for today, NS moistened mesalt packing covered with dry sterile dressing daily.   - medicine consulted for medical management.        Tobacco use disorder  - enc pt to not use tobacco products.  - time spent counseling 3 minutes.           I spent 35 minutes in the professional and overall care of this patient.    Pt seen with Dr. Nunn.     Pebbles Webster, APRN-CNP

## 2024-09-26 NOTE — PROGRESS NOTES
Jatinder Keenan is a 64 y.o. male on day 2 of admission presenting with No Principal Problem: There is no principal problem currently on the Problem List. Please update the Problem List and refresh..      Subjective   The patient complains of less incisional pain today.  NG tube to LIS.       Objective     Last Recorded Vitals  /78   Pulse 78   Temp 36.9 °C (98.4 °F)   Resp 16   Wt 82.6 kg (182 lb 1.6 oz)   SpO2 96%   Intake/Output last 3 Shifts:    Intake/Output Summary (Last 24 hours) at 9/26/2024 1252  Last data filed at 9/26/2024 1038  Gross per 24 hour   Intake 1538.33 ml   Output 2544 ml   Net -1005.67 ml       Admission Weight  Weight: 77 kg (169 lb 12.1 oz) (09/24/24 0626)    Daily Weight  09/25/24 : 82.6 kg (182 lb 1.6 oz)    Image Results  Colonoscopy Screening; Average Risk Patient  Table formatting from the original result was not included.  Impression  The ileocecal valve and cecum appeared normal.  Abnormal mucosa    Findings  The ileocecal valve and cecum appeared normal.  Edematous mucosa in the cecum  Atrophic and erythematous mucosa in the rectum. Rectal stump 15 cm;       Recommendation  Await pathology results   Repeat screening colonoscopy in 10 years, due: 9/21/2034     Indication  Personal history of colonic polyps    Staff  Staff Role   Pallavi Nunn MD Proceduralist     Medications  See Anesthesia Record.     Preprocedure  A history and physical has been performed, and patient medication   allergies have been reviewed. The patient's tolerance of previous   anesthesia has been reviewed. The risks and benefits of the procedure and   the sedation options and risks were discussed with the patient. All   questions were answered and informed consent obtained.    Details of the Procedure  The patient underwent monitored anesthesia care, which was administered by   an anesthesia professional. The patient's blood pressure, ECG, ETCO2,   heart rate, level of consciousness, oxygen  and respirations were monitored   throughout the procedure. A digital rectal exam was performed. The scope   was introduced through the anus and stoma and advanced to the cecum.   Retroflexion was performed in the rectum. The quality of bowel preparation   was evaluated using the [place] Bowel Preparation Scale with scores of:   right colon = 3, transverse colon = 3, left colon = 3. The total BBPS   score was 9. Bowel prep was adequate. The patient experienced no blood   loss. The procedure was not difficult. The patient tolerated the procedure   well. There were no apparent adverse events.     Events  Procedure Events   Event Event Time   ENDO SCOPE IN TIME 9/23/2024  7:32 AM   ENDO CECUM REACHED 9/23/2024  7:36 AM     Specimens  ID Type Source Tests Collected by Time   1 : APPENDICEAL ORFICE ABNORMAL MUCOSA Tissue COLON - CECUM BIOPSY   SURGICAL PATHOLOGY EXAM Pallavi Nunn MD 9/23/2024 0738     Procedure Location  [facility] [place]  [address] [place] [address]  [phone]    Referring Provider  Pallavi Nunn MD    Procedure Provider  MD Pallavi Hurtado      Physical Exam  HENT:      Mouth/Throat:      Mouth: Mucous membranes are dry.   Eyes:      Extraocular Movements: Extraocular movements intact.   Cardiovascular:      Rate and Rhythm: Regular rhythm.   Pulmonary:      Breath sounds: Normal breath sounds.   Abdominal:      General: There is distension.      Palpations: Abdomen is soft.      Tenderness: There is abdominal tenderness.      Comments: Surgical site: c/d/i, drain in place    Musculoskeletal:      Right lower leg: No edema.      Left lower leg: No edema.   Skin:     Capillary Refill: Capillary refill takes less than 2 seconds.   Neurological:      Mental Status: He is alert and oriented to person, place, and time.   Psychiatric:         Mood and Affect: Mood normal.         Relevant Results      Results for orders placed or performed  during the hospital encounter of 09/24/24 (from the past 24 hour(s))   POCT GLUCOSE   Result Value Ref Range    POCT Glucose 141 (H) 74 - 99 mg/dL   POCT GLUCOSE   Result Value Ref Range    POCT Glucose 129 (H) 74 - 99 mg/dL   Comprehensive Metabolic Panel   Result Value Ref Range    Glucose 128 (H) 74 - 99 mg/dL    Sodium 138 136 - 145 mmol/L    Potassium 3.8 3.5 - 5.3 mmol/L    Chloride 100 98 - 107 mmol/L    Bicarbonate 32 21 - 32 mmol/L    Anion Gap 10 10 - 20 mmol/L    Urea Nitrogen 11 6 - 23 mg/dL    Creatinine 0.75 0.50 - 1.30 mg/dL    eGFR >90 >60 mL/min/1.73m*2    Calcium 8.6 8.6 - 10.3 mg/dL    Albumin 3.5 3.4 - 5.0 g/dL    Alkaline Phosphatase 34 33 - 136 U/L    Total Protein 5.9 (L) 6.4 - 8.2 g/dL    AST 11 9 - 39 U/L    Bilirubin, Total 0.7 0.0 - 1.2 mg/dL    ALT 9 (L) 10 - 52 U/L   CBC and Auto Differential   Result Value Ref Range    WBC 8.9 4.4 - 11.3 x10*3/uL    nRBC 0.0 0.0 - 0.0 /100 WBCs    RBC 4.35 (L) 4.50 - 5.90 x10*6/uL    Hemoglobin 14.5 13.5 - 17.5 g/dL    Hematocrit 42.0 41.0 - 52.0 %    MCV 97 80 - 100 fL    MCH 33.3 26.0 - 34.0 pg    MCHC 34.5 32.0 - 36.0 g/dL    RDW 12.0 11.5 - 14.5 %    Platelets 186 150 - 450 x10*3/uL    Neutrophils % 67.1 40.0 - 80.0 %    Immature Granulocytes %, Automated 0.3 0.0 - 0.9 %    Lymphocytes % 19.8 13.0 - 44.0 %    Monocytes % 10.9 2.0 - 10.0 %    Eosinophils % 1.6 0.0 - 6.0 %    Basophils % 0.3 0.0 - 2.0 %    Neutrophils Absolute 6.00 1.20 - 7.70 x10*3/uL    Immature Granulocytes Absolute, Automated 0.03 0.00 - 0.70 x10*3/uL    Lymphocytes Absolute 1.77 1.20 - 4.80 x10*3/uL    Monocytes Absolute 0.97 0.10 - 1.00 x10*3/uL    Eosinophils Absolute 0.14 0.00 - 0.70 x10*3/uL    Basophils Absolute 0.03 0.00 - 0.10 x10*3/uL            Scheduled medications  aspirin, 81 mg, oral, Daily  enoxaparin, 40 mg, subcutaneous, Daily  ezetimibe, 10 mg, oral, Daily  metoprolol succinate XL, 25 mg, oral, Daily  pantoprazole, 40 mg, oral, Daily before breakfast    Or  pantoprazole, 40 mg, intravenous, Daily before breakfast  rosuvastatin, 20 mg, oral, Daily      Continuous medications  potassium rmrndxn-X7-1.45%NaCl, 100 mL/hr, Last Rate: 100 mL/hr (09/26/24 0813)      PRN medications  PRN medications: HYDROmorphone, HYDROmorphone, HYDROmorphone, ondansetron **OR** ondansetron, phenoL     Assessment/Plan                  Jatinder Keenan is a 63 y.o. male Ex smoker with a history of CAD s/p MI and PCI to RCA x 2 (2016), HTN, HLD, remote PE, and CVA (not on Eliquis) presented to the hospital for colostomy closure     #Sp closure colostomy, flex sig, appendectomy, Cystoscopy, bilateral ureteral catheter placement 9/24  - management per primary   - Ancef complete  - flagyl discontinued  - Pain not managed with morphine, will switch for Dilaudid and de escalate when appropriate   - zofran RPN  -Ng tube to LIS  -Low output noted, will continue D5 half-normal with 20 of K  - NPO with ice chips     #CAD  - Resumed crestor, zetia  - hold ASA     #HTN  -Resumed metoprolol  -Monitor BP    #DVT prophylaxis: Lovenox subcu    Dispo: Anticipate discharge on October 1, 2024.              Estela Bocanegra, APRN-CNP

## 2024-09-26 NOTE — CARE PLAN
Problem: Nutrition  Goal: Less than 5 days NPO/clear liquids  Outcome: Progressing  Goal: Oral intake greater than 50%  Outcome: Progressing  Goal: Oral intake greater 75%  Outcome: Progressing  Goal: Consume prescribed supplement  Outcome: Progressing  Goal: Adequate PO fluid intake  Outcome: Progressing  Goal: Nutrition support goals are met within 48 hrs  Outcome: Progressing  Goal: Nutrition support is meeting 75% of nutrient needs  Outcome: Progressing  Goal: Tube feed tolerance  Outcome: Progressing  Goal: BG  mg/dL  Outcome: Progressing  Goal: Lab values WNL  Outcome: Progressing  Goal: Electrolytes WNL  Outcome: Progressing  Goal: Promote healing  Outcome: Progressing  Goal: Maintain stable weight  Outcome: Progressing  Goal: Reduce weight from edema/fluid  Outcome: Progressing  Goal: Gradual weight gain  Outcome: Progressing  Goal: Improve ostomy output  Outcome: Progressing     Problem: Pain - Adult  Goal: Verbalizes/displays adequate comfort level or baseline comfort level  Outcome: Progressing     Problem: Safety - Adult  Goal: Free from fall injury  Outcome: Progressing     Problem: Discharge Planning  Goal: Discharge to home or other facility with appropriate resources  Outcome: Progressing     Problem: Chronic Conditions and Co-morbidities  Goal: Patient's chronic conditions and co-morbidity symptoms are monitored and maintained or improved  Outcome: Progressing     Problem: Diabetes  Goal: Achieve decreasing blood glucose levels by end of shift  Outcome: Progressing  Goal: Increase stability of blood glucose readings by end of shift  Outcome: Progressing  Goal: Decrease in ketones present in urine by end of shift  Outcome: Progressing  Goal: Maintain electrolyte levels within acceptable range throughout shift  Outcome: Progressing  Goal: Maintain glucose levels >70mg/dl to <250mg/dl throughout shift  Outcome: Progressing  Goal: No changes in neurological exam by end of shift  Outcome:  Progressing  Goal: Learn about and adhere to nutrition recommendations by end of shift  Outcome: Progressing  Goal: Vital signs within normal range for age by end of shift  Outcome: Progressing  Goal: Increase self care and/or family involovement by end of shift  Outcome: Progressing  Goal: Receive DSME education by end of shift  Outcome: Progressing     Problem: Skin  Goal: Decreased wound size/increased tissue granulation at next dressing change  Outcome: Progressing  Goal: Participates in plan/prevention/treatment measures  Outcome: Progressing  Goal: Prevent/manage excess moisture  Outcome: Progressing  Goal: Prevent/minimize sheer/friction injuries  Outcome: Progressing  Goal: Promote/optimize nutrition  Outcome: Progressing  Goal: Promote skin healing  Outcome: Progressing     Problem: Pain  Goal: Takes deep breaths with improved pain control throughout the shift  Outcome: Progressing  Goal: Turns in bed with improved pain control throughout the shift  Outcome: Progressing  Goal: Walks with improved pain control throughout the shift  Outcome: Progressing  Goal: Performs ADL's with improved pain control throughout shift  Outcome: Progressing  Goal: Participates in PT with improved pain control throughout the shift  Outcome: Progressing  Goal: Free from opioid side effects throughout the shift  Outcome: Progressing  Goal: Free from acute confusion related to pain meds throughout the shift  Outcome: Progressing     Problem: Indwelling Catheter Maintenance  Goal: I will have no complications from indwelling catheter  Outcome: Progressing   The patient's goals for the shift include  sleep    The clinical goals for the shift include Patient will verbalize pain less than 10 this shift    Over the shift, the patient did not make progress toward the following goals. Barriers to progression include pain. Recommendations to address these barriers include medications, relaxation, and ice.     Patient tolerated turning  himself this shift. Patient was able to tolerate his grey   this shift. Patient was able to sleep at least 6 hours this shift, on and off.

## 2024-09-27 ENCOUNTER — PHARMACY VISIT (OUTPATIENT)
Dept: PHARMACY | Facility: CLINIC | Age: 64
End: 2024-09-27
Payer: COMMERCIAL

## 2024-09-27 LAB
ALBUMIN SERPL BCP-MCNC: 3.2 G/DL (ref 3.4–5)
ALP SERPL-CCNC: 34 U/L (ref 33–136)
ALT SERPL W P-5'-P-CCNC: 7 U/L (ref 10–52)
ANION GAP SERPL CALC-SCNC: 10 MMOL/L (ref 10–20)
AST SERPL W P-5'-P-CCNC: 10 U/L (ref 9–39)
BASOPHILS # BLD AUTO: 0.04 X10*3/UL (ref 0–0.1)
BASOPHILS NFR BLD AUTO: 0.4 %
BILIRUB SERPL-MCNC: 0.8 MG/DL (ref 0–1.2)
BUN SERPL-MCNC: 8 MG/DL (ref 6–23)
CALCIUM SERPL-MCNC: 8.5 MG/DL (ref 8.6–10.3)
CHLORIDE SERPL-SCNC: 100 MMOL/L (ref 98–107)
CO2 SERPL-SCNC: 27 MMOL/L (ref 21–32)
CREAT SERPL-MCNC: 0.54 MG/DL (ref 0.5–1.3)
EGFRCR SERPLBLD CKD-EPI 2021: >90 ML/MIN/1.73M*2
EOSINOPHIL # BLD AUTO: 0.27 X10*3/UL (ref 0–0.7)
EOSINOPHIL NFR BLD AUTO: 3 %
ERYTHROCYTE [DISTWIDTH] IN BLOOD BY AUTOMATED COUNT: 12.1 % (ref 11.5–14.5)
GLUCOSE SERPL-MCNC: 126 MG/DL (ref 74–99)
HCT VFR BLD AUTO: 39.6 % (ref 41–52)
HGB BLD-MCNC: 13.7 G/DL (ref 13.5–17.5)
IMM GRANULOCYTES # BLD AUTO: 0.04 X10*3/UL (ref 0–0.7)
IMM GRANULOCYTES NFR BLD AUTO: 0.4 % (ref 0–0.9)
LYMPHOCYTES # BLD AUTO: 1.36 X10*3/UL (ref 1.2–4.8)
LYMPHOCYTES NFR BLD AUTO: 15 %
MCH RBC QN AUTO: 33.3 PG (ref 26–34)
MCHC RBC AUTO-ENTMCNC: 34.6 G/DL (ref 32–36)
MCV RBC AUTO: 96 FL (ref 80–100)
MONOCYTES # BLD AUTO: 0.76 X10*3/UL (ref 0.1–1)
MONOCYTES NFR BLD AUTO: 8.4 %
NEUTROPHILS # BLD AUTO: 6.61 X10*3/UL (ref 1.2–7.7)
NEUTROPHILS NFR BLD AUTO: 72.8 %
NRBC BLD-RTO: 0 /100 WBCS (ref 0–0)
PLATELET # BLD AUTO: 191 X10*3/UL (ref 150–450)
POTASSIUM SERPL-SCNC: 3.9 MMOL/L (ref 3.5–5.3)
PROT SERPL-MCNC: 5.7 G/DL (ref 6.4–8.2)
RBC # BLD AUTO: 4.11 X10*6/UL (ref 4.5–5.9)
SODIUM SERPL-SCNC: 133 MMOL/L (ref 136–145)
WBC # BLD AUTO: 9.1 X10*3/UL (ref 4.4–11.3)

## 2024-09-27 PROCEDURE — 2500000002 HC RX 250 W HCPCS SELF ADMINISTERED DRUGS (ALT 637 FOR MEDICARE OP, ALT 636 FOR OP/ED): Performed by: NURSE PRACTITIONER

## 2024-09-27 PROCEDURE — 2500000004 HC RX 250 GENERAL PHARMACY W/ HCPCS (ALT 636 FOR OP/ED): Performed by: NURSE PRACTITIONER

## 2024-09-27 PROCEDURE — 2500000001 HC RX 250 WO HCPCS SELF ADMINISTERED DRUGS (ALT 637 FOR MEDICARE OP): Performed by: NURSE PRACTITIONER

## 2024-09-27 PROCEDURE — 99233 SBSQ HOSP IP/OBS HIGH 50: CPT | Performed by: NURSE PRACTITIONER

## 2024-09-27 PROCEDURE — 85025 COMPLETE CBC W/AUTO DIFF WBC: CPT | Performed by: STUDENT IN AN ORGANIZED HEALTH CARE EDUCATION/TRAINING PROGRAM

## 2024-09-27 PROCEDURE — 36415 COLL VENOUS BLD VENIPUNCTURE: CPT | Performed by: STUDENT IN AN ORGANIZED HEALTH CARE EDUCATION/TRAINING PROGRAM

## 2024-09-27 PROCEDURE — 2500000004 HC RX 250 GENERAL PHARMACY W/ HCPCS (ALT 636 FOR OP/ED): Performed by: PHYSICIAN ASSISTANT

## 2024-09-27 PROCEDURE — 2500000001 HC RX 250 WO HCPCS SELF ADMINISTERED DRUGS (ALT 637 FOR MEDICARE OP): Performed by: PHYSICIAN ASSISTANT

## 2024-09-27 PROCEDURE — 2500000004 HC RX 250 GENERAL PHARMACY W/ HCPCS (ALT 636 FOR OP/ED): Performed by: SURGERY

## 2024-09-27 PROCEDURE — 99232 SBSQ HOSP IP/OBS MODERATE 35: CPT | Performed by: NURSE PRACTITIONER

## 2024-09-27 PROCEDURE — 80053 COMPREHEN METABOLIC PANEL: CPT | Performed by: STUDENT IN AN ORGANIZED HEALTH CARE EDUCATION/TRAINING PROGRAM

## 2024-09-27 PROCEDURE — RXMED WILLOW AMBULATORY MEDICATION CHARGE

## 2024-09-27 PROCEDURE — 1200000002 HC GENERAL ROOM WITH TELEMETRY DAILY

## 2024-09-27 RX ORDER — ACETAMINOPHEN 325 MG/1
650 TABLET ORAL EVERY 6 HOURS
Status: DISCONTINUED | OUTPATIENT
Start: 2024-09-27 | End: 2024-09-30 | Stop reason: HOSPADM

## 2024-09-27 RX ORDER — GABAPENTIN 100 MG/1
100 CAPSULE ORAL 2 TIMES DAILY
Status: DISCONTINUED | OUTPATIENT
Start: 2024-09-27 | End: 2024-09-30 | Stop reason: HOSPADM

## 2024-09-27 RX ORDER — ACETAMINOPHEN 325 MG/1
650 TABLET ORAL EVERY 6 HOURS PRN
Qty: 30 TABLET | Refills: 0 | Status: SHIPPED | OUTPATIENT
Start: 2024-09-27

## 2024-09-27 RX ORDER — PANTOPRAZOLE SODIUM 40 MG/1
40 TABLET, DELAYED RELEASE ORAL
Qty: 30 TABLET | Refills: 0 | Status: SHIPPED | OUTPATIENT
Start: 2024-09-28 | End: 2024-10-28

## 2024-09-27 RX ORDER — GABAPENTIN 100 MG/1
100 CAPSULE ORAL 2 TIMES DAILY
Qty: 20 CAPSULE | Refills: 0 | Status: SHIPPED | OUTPATIENT
Start: 2024-09-27 | End: 2024-10-04 | Stop reason: SDUPTHER

## 2024-09-27 RX ORDER — KETOROLAC TROMETHAMINE 15 MG/ML
15 INJECTION, SOLUTION INTRAMUSCULAR; INTRAVENOUS EVERY 6 HOURS
Status: COMPLETED | OUTPATIENT
Start: 2024-09-27 | End: 2024-09-28

## 2024-09-27 RX ORDER — CYCLOBENZAPRINE HCL 5 MG
5 TABLET ORAL 2 TIMES DAILY PRN
Qty: 20 TABLET | Refills: 0 | Status: SHIPPED | OUTPATIENT
Start: 2024-09-27 | End: 2024-10-04 | Stop reason: SDUPTHER

## 2024-09-27 RX ORDER — CYCLOBENZAPRINE HCL 5 MG
5 TABLET ORAL 2 TIMES DAILY
Status: DISCONTINUED | OUTPATIENT
Start: 2024-09-27 | End: 2024-09-30 | Stop reason: HOSPADM

## 2024-09-27 RX ORDER — POLYETHYLENE GLYCOL 3350 17 G/17G
17 POWDER, FOR SOLUTION ORAL DAILY
Status: DISCONTINUED | OUTPATIENT
Start: 2024-09-27 | End: 2024-09-30 | Stop reason: HOSPADM

## 2024-09-27 RX ADMIN — GABAPENTIN 100 MG: 100 CAPSULE ORAL at 21:55

## 2024-09-27 RX ADMIN — CYCLOBENZAPRINE HYDROCHLORIDE 5 MG: 5 TABLET, FILM COATED ORAL at 10:40

## 2024-09-27 RX ADMIN — POTASSIUM CHLORIDE, DEXTROSE MONOHYDRATE AND SODIUM CHLORIDE 100 ML/HR: 150; 5; 450 INJECTION, SOLUTION INTRAVENOUS at 03:16

## 2024-09-27 RX ADMIN — POTASSIUM CHLORIDE, DEXTROSE MONOHYDRATE AND SODIUM CHLORIDE 100 ML/HR: 150; 5; 450 INJECTION, SOLUTION INTRAVENOUS at 14:37

## 2024-09-27 RX ADMIN — ROSUVASTATIN CALCIUM 20 MG: 20 TABLET, FILM COATED ORAL at 08:08

## 2024-09-27 RX ADMIN — HYDROMORPHONE HYDROCHLORIDE 1 MG: 1 INJECTION, SOLUTION INTRAMUSCULAR; INTRAVENOUS; SUBCUTANEOUS at 20:35

## 2024-09-27 RX ADMIN — EZETIMIBE 10 MG: 10 TABLET ORAL at 08:08

## 2024-09-27 RX ADMIN — HYDROMORPHONE HYDROCHLORIDE 1 MG: 1 INJECTION, SOLUTION INTRAMUSCULAR; INTRAVENOUS; SUBCUTANEOUS at 12:26

## 2024-09-27 RX ADMIN — KETOROLAC TROMETHAMINE 15 MG: 15 INJECTION, SOLUTION INTRAMUSCULAR; INTRAVENOUS at 16:18

## 2024-09-27 RX ADMIN — KETOROLAC TROMETHAMINE 15 MG: 15 INJECTION, SOLUTION INTRAMUSCULAR; INTRAVENOUS at 22:37

## 2024-09-27 RX ADMIN — ENOXAPARIN SODIUM 40 MG: 40 INJECTION SUBCUTANEOUS at 08:08

## 2024-09-27 RX ADMIN — HYDROMORPHONE HYDROCHLORIDE 1 MG: 1 INJECTION, SOLUTION INTRAMUSCULAR; INTRAVENOUS; SUBCUTANEOUS at 04:23

## 2024-09-27 RX ADMIN — HYDROMORPHONE HYDROCHLORIDE 1 MG: 1 INJECTION, SOLUTION INTRAMUSCULAR; INTRAVENOUS; SUBCUTANEOUS at 16:18

## 2024-09-27 RX ADMIN — KETOROLAC TROMETHAMINE 15 MG: 15 INJECTION, SOLUTION INTRAMUSCULAR; INTRAVENOUS at 11:33

## 2024-09-27 RX ADMIN — METOPROLOL SUCCINATE 25 MG: 25 TABLET, EXTENDED RELEASE ORAL at 08:08

## 2024-09-27 RX ADMIN — CYCLOBENZAPRINE HYDROCHLORIDE 5 MG: 5 TABLET, FILM COATED ORAL at 21:55

## 2024-09-27 RX ADMIN — HYDROMORPHONE HYDROCHLORIDE 1 MG: 1 INJECTION, SOLUTION INTRAMUSCULAR; INTRAVENOUS; SUBCUTANEOUS at 08:07

## 2024-09-27 RX ADMIN — PANTOPRAZOLE SODIUM 40 MG: 40 INJECTION, POWDER, FOR SOLUTION INTRAVENOUS at 05:42

## 2024-09-27 RX ADMIN — ASPIRIN 81 MG: 81 TABLET, COATED ORAL at 08:08

## 2024-09-27 ASSESSMENT — COGNITIVE AND FUNCTIONAL STATUS - GENERAL
DRESSING REGULAR UPPER BODY CLOTHING: A LITTLE
DRESSING REGULAR LOWER BODY CLOTHING: A LITTLE
MOVING FROM LYING ON BACK TO SITTING ON SIDE OF FLAT BED WITH BEDRAILS: A LITTLE
MOBILITY SCORE: 18
MOBILITY SCORE: 18
CLIMB 3 TO 5 STEPS WITH RAILING: A LITTLE
WALKING IN HOSPITAL ROOM: A LITTLE
DRESSING REGULAR LOWER BODY CLOTHING: A LITTLE
WALKING IN HOSPITAL ROOM: A LITTLE
MOVING FROM LYING ON BACK TO SITTING ON SIDE OF FLAT BED WITH BEDRAILS: A LITTLE
TURNING FROM BACK TO SIDE WHILE IN FLAT BAD: A LITTLE
TOILETING: A LITTLE
DAILY ACTIVITIY SCORE: 20
HELP NEEDED FOR BATHING: A LITTLE
TURNING FROM BACK TO SIDE WHILE IN FLAT BAD: A LITTLE
STANDING UP FROM CHAIR USING ARMS: A LITTLE
MOVING TO AND FROM BED TO CHAIR: A LITTLE
MOVING TO AND FROM BED TO CHAIR: A LITTLE
HELP NEEDED FOR BATHING: A LITTLE
DAILY ACTIVITIY SCORE: 21
STANDING UP FROM CHAIR USING ARMS: A LITTLE
CLIMB 3 TO 5 STEPS WITH RAILING: A LITTLE
TOILETING: A LITTLE

## 2024-09-27 ASSESSMENT — PAIN SCALES - GENERAL
PAINLEVEL_OUTOF10: 7
PAINLEVEL_OUTOF10: 8
PAINLEVEL_OUTOF10: 9
PAINLEVEL_OUTOF10: 3
PAINLEVEL_OUTOF10: 10 - WORST POSSIBLE PAIN
PAINLEVEL_OUTOF10: 8
PAINLEVEL_OUTOF10: 7
PAINLEVEL_OUTOF10: 4

## 2024-09-27 ASSESSMENT — PAIN - FUNCTIONAL ASSESSMENT
PAIN_FUNCTIONAL_ASSESSMENT: 0-10

## 2024-09-27 ASSESSMENT — ACTIVITIES OF DAILY LIVING (ADL): LACK_OF_TRANSPORTATION: NO

## 2024-09-27 ASSESSMENT — PAIN DESCRIPTION - LOCATION: LOCATION: ABDOMEN

## 2024-09-27 NOTE — CARE PLAN
The patient's goals for the shift include  pain control and ambulation    The clinical goals for the shift include pain control and ambulation

## 2024-09-27 NOTE — PROGRESS NOTES
09/27/24 1600   Discharge Planning   Assistance Needed Patient is out of service area of [facility]. Referral sent to [facility]. Awaiting agency to respond if they are able to accept patient.

## 2024-09-27 NOTE — DISCHARGE INSTRUCTIONS
Please follow up with Dr. Nunn in the out patient clinic in 2 weeks from discharge from hospital.  [id number] [id number] [phone]     Please call Dr. Nunn's office if you have any concerns of infection - fever over 100.4, increase pain, redness, swelling, increase in drainage that is yellow/green/brown/tan/ or has an odor.     Please be sure to hydrate well.     Please continue in your journey through life as a non smoker - yay for you!    You may use tylenol 650 mg every 6 hours as needed for pain.   You may use flexeril 5 mg twice a day as needed for muscle aches/discomfort.   You can use miralax once a day to prevent constipation.   Please take pantoprazole 40 mg once a day for a month to reduce acid production in the stomach.     Someone with home care will call you about having a nurse come to the house to help with dressing changes. They will not come daily but will come a couple times a week to make sure you are healing as you should. Plan that someone who lives with you will do the dressing changes for you each day. Dressing changes should be done once a day, but may need to be completed more frequently if there is a lot of drainage. You will use normal saline moistened mesalt, which is a gauze type material, to pack the wound. Be sure to leave a little tail sticking out that it is easy to remove at the next dressing change time. Once the packing is completed then cover the surgical wound with a dry sterile gauze pad and use paper tape to secure it to your skin.

## 2024-09-27 NOTE — PROGRESS NOTES
Jatinder Keenan is a 64 y.o. male on day 3 of admission presenting with No Principal Problem: There is no principal problem currently on the Problem List. Please update the Problem List and refresh..    Subjective   Pt is post op day 3 from colostomy closure, cystoscopy with Pineda placement on 9/24/24 with Kenia Nunn and Tabitha Spencer and Zurdo assisting.     No acute overnight events.   Pain is better controlled overall but still with pain in abd with mvt.   Denies and bloating, just c/o aches and tenderness.     No n/v. No heartburn.   NG tube uncomfortable but chloraseptic spray helps.   Tolerating ice chips.   Feeling that gas is moving in GI tract but has not passed gas yet. Does have more belching today.  Has gotten up and walked in room, sat up in chair a few times yesterday.     No CP, SOB, HA, light headed.       Objective     Physical Exam  Vitals reviewed.   Constitutional:       General: He is not in acute distress.     Appearance: Normal appearance. He is not ill-appearing, toxic-appearing or diaphoretic.   HENT:      Head: Normocephalic and atraumatic.      Nose:      Comments: Left nares with NG tube in place, brownish/red drainage.     Mouth/Throat:      Mouth: Mucous membranes are moist.   Eyes:      General: No scleral icterus.        Right eye: No discharge.         Left eye: No discharge.      Conjunctiva/sclera: Conjunctivae normal.   Cardiovascular:      Rate and Rhythm: Normal rate and regular rhythm.      Pulses: Normal pulses.      Heart sounds: Normal heart sounds. No murmur heard.     No friction rub. No gallop.   Pulmonary:      Effort: Pulmonary effort is normal. No respiratory distress.      Breath sounds: Normal breath sounds. No stridor. No wheezing, rhonchi or rales.   Chest:      Chest wall: No tenderness.   Abdominal:      Tenderness: There is abdominal tenderness.      Comments: Hypoactive BS x 4 q.   Tender abd throughout.   Right abd with RADHA drain with serosanguinous  "drainage.  Midline abd vertical surgical incision closed with glue. No bleeding, drainage, bruising, swelling, erythema. Well approximated.   Left abd with horizontal surgical incision, dressing changed. Old packing removed, bloody drainage. Packed with NS moistened mesalt and covered with dry sterile dressing. No surrounding erythema, swelling.      Genitourinary:     Comments: Pineda with yellow urine. No blood or sediment.   Musculoskeletal:      Right lower leg: No edema.      Left lower leg: No edema.   Skin:     General: Skin is warm and dry.   Neurological:      Mental Status: He is alert and oriented to person, place, and time.      Motor: Weakness (generalized) present.      Gait: Gait normal.   Psychiatric:         Mood and Affect: Mood normal.         Behavior: Behavior normal.         Thought Content: Thought content normal.         Judgment: Judgment normal.       Last Recorded Vitals  Blood pressure 148/87, pulse 79, temperature 36.3 °C (97.3 °F), temperature source Temporal, resp. rate 16, height 1.702 m (5' 7\"), weight 82.6 kg (182 lb 1.6 oz), SpO2 91%.  Intake/Output last 3 Shifts:  I/O last 3 completed shifts:  In: 1538.3 (18.6 mL/kg) [IV Piggyback:1538.3]  Out: 2804 (33.9 mL/kg) [Urine:1300 (0.4 mL/kg/hr); Emesis/NG output:1475; Drains:29]  Weight: 82.6 kg     Relevant Results    Colonoscopy Screening; Average Risk Patient    Result Date: 9/23/2024  Table formatting from the original result was not included. Impression The ileocecal valve and cecum appeared normal. Abnormal mucosa Findings The ileocecal valve and cecum appeared normal. Edematous mucosa in the cecum Atrophic and erythematous mucosa in the rectum. Rectal stump 15 cm;  Recommendation Await pathology results Repeat screening colonoscopy in 10 years, due: 9/21/2034 Indication Personal history of colonic polyps Staff Staff Role Pallavi Nunn MD Proceduralist Medications See Anesthesia Record. Preprocedure A history and physical has " been performed, and patient medication allergies have been reviewed. The patient's tolerance of previous anesthesia has been reviewed. The risks and benefits of the procedure and the sedation options and risks were discussed with the patient. All questions were answered and informed consent obtained. Details of the Procedure The patient underwent monitored anesthesia care, which was administered by an anesthesia professional. The patient's blood pressure, ECG, ETCO2, heart rate, level of consciousness, oxygen and respirations were monitored throughout the procedure. A digital rectal exam was performed. The scope was introduced through the anus and stoma and advanced to the cecum. Retroflexion was performed in the rectum. The quality of bowel preparation was evaluated using the [place] Bowel Preparation Scale with scores of: right colon = 3, transverse colon = 3, left colon = 3. The total BBPS score was 9. Bowel prep was adequate. The patient experienced no blood loss. The procedure was not difficult. The patient tolerated the procedure well. There were no apparent adverse events. Events Procedure Events Event Event Time ENDO SCOPE IN TIME 9/23/2024  7:32 AM ENDO CECUM REACHED 9/23/2024  7:36 AM Specimens ID Type Source Tests Collected by Time 1 : APPENDICEAL ORFICE ABNORMAL MUCOSA Tissue COLON - CECUM BIOPSY SURGICAL PATHOLOGY EXAM Pallavi Nunn MD 9/23/2024 0738 Procedure Location [facility] [place] [id number] [place] [address] [phone] Referring Provider Pallavi Nunn MD Procedure Provider MD Pallavi Hurtado     Scheduled medications  aspirin, 81 mg, oral, Daily  enoxaparin, 40 mg, subcutaneous, Daily  ezetimibe, 10 mg, oral, Daily  metoprolol succinate XL, 25 mg, oral, Daily  pantoprazole, 40 mg, oral, Daily before breakfast   Or  pantoprazole, 40 mg, intravenous, Daily before breakfast  rosuvastatin, 20 mg, oral, Daily      Continuous  medications  potassium bsbqwov-Y1-6.45%NaCl, 100 mL/hr, Last Rate: 100 mL/hr (09/27/24 0316)      PRN medications  PRN medications: HYDROmorphone, HYDROmorphone, HYDROmorphone, ondansetron **OR** ondansetron, phenoL  Results for orders placed or performed during the hospital encounter of 09/24/24 (from the past 24 hour(s))   Comprehensive Metabolic Panel   Result Value Ref Range    Glucose 126 (H) 74 - 99 mg/dL    Sodium 133 (L) 136 - 145 mmol/L    Potassium 3.9 3.5 - 5.3 mmol/L    Chloride 100 98 - 107 mmol/L    Bicarbonate 27 21 - 32 mmol/L    Anion Gap 10 10 - 20 mmol/L    Urea Nitrogen 8 6 - 23 mg/dL    Creatinine 0.54 0.50 - 1.30 mg/dL    eGFR >90 >60 mL/min/1.73m*2    Calcium 8.5 (L) 8.6 - 10.3 mg/dL    Albumin 3.2 (L) 3.4 - 5.0 g/dL    Alkaline Phosphatase 34 33 - 136 U/L    Total Protein 5.7 (L) 6.4 - 8.2 g/dL    AST 10 9 - 39 U/L    Bilirubin, Total 0.8 0.0 - 1.2 mg/dL    ALT 7 (L) 10 - 52 U/L   CBC and Auto Differential   Result Value Ref Range    WBC 9.1 4.4 - 11.3 x10*3/uL    nRBC 0.0 0.0 - 0.0 /100 WBCs    RBC 4.11 (L) 4.50 - 5.90 x10*6/uL    Hemoglobin 13.7 13.5 - 17.5 g/dL    Hematocrit 39.6 (L) 41.0 - 52.0 %    MCV 96 80 - 100 fL    MCH 33.3 26.0 - 34.0 pg    MCHC 34.6 32.0 - 36.0 g/dL    RDW 12.1 11.5 - 14.5 %    Platelets 191 150 - 450 x10*3/uL    Neutrophils % 72.8 40.0 - 80.0 %    Immature Granulocytes %, Automated 0.4 0.0 - 0.9 %    Lymphocytes % 15.0 13.0 - 44.0 %    Monocytes % 8.4 2.0 - 10.0 %    Eosinophils % 3.0 0.0 - 6.0 %    Basophils % 0.4 0.0 - 2.0 %    Neutrophils Absolute 6.61 1.20 - 7.70 x10*3/uL    Immature Granulocytes Absolute, Automated 0.04 0.00 - 0.70 x10*3/uL    Lymphocytes Absolute 1.36 1.20 - 4.80 x10*3/uL    Monocytes Absolute 0.76 0.10 - 1.00 x10*3/uL    Eosinophils Absolute 0.27 0.00 - 0.70 x10*3/uL    Basophils Absolute 0.04 0.00 - 0.10 x10*3/uL                            Assessment/Plan   Assessment & Plan    Pt is post op day 3 coloscopy closure with Dr. Nunn on  9/24/24     - imaging reviewed.  - labs reviewed. Mild anemia. Kidney function stable. Cont to monitor labs.  - Cont to monitor strict Is/Os.  - ancef completed.  - NPO with ice chips.  - D5 1/2 NS with 20 MEq Kcl at 100 ml / hour.  - NG to LIWS to stay in place until return of bowel function and passing gas. Hopeful that will be able to remove tomorrow.  - cont PPI.  - Chloraseptic throat spray ordered. May keep this in the room for pt to use.   - pain better controlled with dilaudid, cont as ordered.   - zofran PRN as ordered.   - grey to stay in place at least until patient up walking more  - RADHA drain with 10 ml in last 24 hours. Cont to monitor output.  - activity as tolerated, enc pt to get up to walk in halls today.  - lovenox 40 mg subcutaneous daily for DVT prophylaxis.  - SCDs while in bed.  - left abd surgical dressing change completed for today, NS moistened mesalt packing covered with dry sterile dressing daily.   - medicine consulted for medical management.             I spent 35 minutes in the professional and overall care of this patient.    Dr. Nunn updated on plan of care.    Pebbles Webster, APRN-CNP

## 2024-09-27 NOTE — PROGRESS NOTES
09/27/24 1046   Discharge Planning   Living Arrangements Spouse/significant other   Support Systems Spouse/significant other;Family members;Friends/neighbors   Assistance Needed Patient is A&O X3, on room air at baseline (currently on O2), is independent with ADLs, drives and uses no DME at home. Per wife patient was previously active with [facility] but it not currently active with any home care agencies at this time. DC needs TBD closer to DC. Patient currently has NG in place. Per surgery: NG to LIWS to stay in place until return of bowel function and passing gas.   Type of Residence Private residence   Number of Stairs to Enter Residence 2   Number of Stairs Within Residence 14   Do you have animals or pets at home? No   Who is requesting discharge planning? Provider   Home or Post Acute Services In home services   Type of Home Care Services Home PT;Home OT;Home nursing visits   Expected Discharge Disposition Home H   Does the patient need discharge transport arranged? No   Financial Resource Strain   How hard is it for you to pay for the very basics like food, housing, medical care, and heating? Not very   Housing Stability   In the last 12 months, was there a time when you were not able to pay the mortgage or rent on time? N   At any time in the past 12 months, were you homeless or living in a shelter (including now)? N   Transportation Needs   In the past 12 months, has lack of transportation kept you from medical appointments or from getting medications? no   In the past 12 months, has lack of transportation kept you from meetings, work, or from getting things needed for daily living? No

## 2024-09-27 NOTE — CARE PLAN
The patient's goals for the shift include  pain control    Problem: Pain - Adult  Goal: Verbalizes/displays adequate comfort level or baseline comfort level  Outcome: Progressing     Problem: Safety - Adult  Goal: Free from fall injury  Outcome: Progressing     Problem: Discharge Planning  Goal: Discharge to home or other facility with appropriate resources  Outcome: Progressing     Problem: Chronic Conditions and Co-morbidities  Goal: Patient's chronic conditions and co-morbidity symptoms are monitored and maintained or improved  Outcome: Progressing     The clinical goals for the shift include pain control

## 2024-09-28 LAB
ALBUMIN SERPL BCP-MCNC: 3.1 G/DL (ref 3.4–5)
ALP SERPL-CCNC: 33 U/L (ref 33–136)
ALT SERPL W P-5'-P-CCNC: 7 U/L (ref 10–52)
ANION GAP SERPL CALC-SCNC: 11 MMOL/L (ref 10–20)
AST SERPL W P-5'-P-CCNC: 10 U/L (ref 9–39)
BASOPHILS # BLD AUTO: 0.03 X10*3/UL (ref 0–0.1)
BASOPHILS NFR BLD AUTO: 0.4 %
BILIRUB SERPL-MCNC: 0.9 MG/DL (ref 0–1.2)
BUN SERPL-MCNC: 10 MG/DL (ref 6–23)
CALCIUM SERPL-MCNC: 8.6 MG/DL (ref 8.6–10.3)
CHLORIDE SERPL-SCNC: 102 MMOL/L (ref 98–107)
CO2 SERPL-SCNC: 27 MMOL/L (ref 21–32)
CREAT SERPL-MCNC: 0.63 MG/DL (ref 0.5–1.3)
EGFRCR SERPLBLD CKD-EPI 2021: >90 ML/MIN/1.73M*2
EOSINOPHIL # BLD AUTO: 0.44 X10*3/UL (ref 0–0.7)
EOSINOPHIL NFR BLD AUTO: 6.1 %
ERYTHROCYTE [DISTWIDTH] IN BLOOD BY AUTOMATED COUNT: 12.1 % (ref 11.5–14.5)
GLUCOSE SERPL-MCNC: 117 MG/DL (ref 74–99)
HCT VFR BLD AUTO: 38 % (ref 41–52)
HGB BLD-MCNC: 13.2 G/DL (ref 13.5–17.5)
IMM GRANULOCYTES # BLD AUTO: 0.02 X10*3/UL (ref 0–0.7)
IMM GRANULOCYTES NFR BLD AUTO: 0.3 % (ref 0–0.9)
LYMPHOCYTES # BLD AUTO: 1.41 X10*3/UL (ref 1.2–4.8)
LYMPHOCYTES NFR BLD AUTO: 19.5 %
MCH RBC QN AUTO: 33.2 PG (ref 26–34)
MCHC RBC AUTO-ENTMCNC: 34.7 G/DL (ref 32–36)
MCV RBC AUTO: 96 FL (ref 80–100)
MONOCYTES # BLD AUTO: 0.71 X10*3/UL (ref 0.1–1)
MONOCYTES NFR BLD AUTO: 9.8 %
NEUTROPHILS # BLD AUTO: 4.62 X10*3/UL (ref 1.2–7.7)
NEUTROPHILS NFR BLD AUTO: 63.9 %
NRBC BLD-RTO: 0 /100 WBCS (ref 0–0)
PLATELET # BLD AUTO: 213 X10*3/UL (ref 150–450)
POTASSIUM SERPL-SCNC: 3.8 MMOL/L (ref 3.5–5.3)
PROT SERPL-MCNC: 5.8 G/DL (ref 6.4–8.2)
RBC # BLD AUTO: 3.97 X10*6/UL (ref 4.5–5.9)
SODIUM SERPL-SCNC: 136 MMOL/L (ref 136–145)
WBC # BLD AUTO: 7.2 X10*3/UL (ref 4.4–11.3)

## 2024-09-28 PROCEDURE — 2500000001 HC RX 250 WO HCPCS SELF ADMINISTERED DRUGS (ALT 637 FOR MEDICARE OP): Performed by: PHYSICIAN ASSISTANT

## 2024-09-28 PROCEDURE — 2500000004 HC RX 250 GENERAL PHARMACY W/ HCPCS (ALT 636 FOR OP/ED): Performed by: NURSE PRACTITIONER

## 2024-09-28 PROCEDURE — 80053 COMPREHEN METABOLIC PANEL: CPT | Performed by: STUDENT IN AN ORGANIZED HEALTH CARE EDUCATION/TRAINING PROGRAM

## 2024-09-28 PROCEDURE — 1200000002 HC GENERAL ROOM WITH TELEMETRY DAILY

## 2024-09-28 PROCEDURE — 36415 COLL VENOUS BLD VENIPUNCTURE: CPT | Performed by: STUDENT IN AN ORGANIZED HEALTH CARE EDUCATION/TRAINING PROGRAM

## 2024-09-28 PROCEDURE — 2500000004 HC RX 250 GENERAL PHARMACY W/ HCPCS (ALT 636 FOR OP/ED): Performed by: SURGERY

## 2024-09-28 PROCEDURE — 99024 POSTOP FOLLOW-UP VISIT: CPT | Performed by: SURGERY

## 2024-09-28 PROCEDURE — 2500000002 HC RX 250 W HCPCS SELF ADMINISTERED DRUGS (ALT 637 FOR MEDICARE OP, ALT 636 FOR OP/ED): Performed by: NURSE PRACTITIONER

## 2024-09-28 PROCEDURE — 2500000004 HC RX 250 GENERAL PHARMACY W/ HCPCS (ALT 636 FOR OP/ED): Performed by: PHYSICIAN ASSISTANT

## 2024-09-28 PROCEDURE — 2500000001 HC RX 250 WO HCPCS SELF ADMINISTERED DRUGS (ALT 637 FOR MEDICARE OP): Performed by: NURSE PRACTITIONER

## 2024-09-28 PROCEDURE — 85025 COMPLETE CBC W/AUTO DIFF WBC: CPT | Performed by: STUDENT IN AN ORGANIZED HEALTH CARE EDUCATION/TRAINING PROGRAM

## 2024-09-28 PROCEDURE — 99233 SBSQ HOSP IP/OBS HIGH 50: CPT | Performed by: NURSE PRACTITIONER

## 2024-09-28 RX ADMIN — ACETAMINOPHEN 650 MG: 325 TABLET ORAL at 22:16

## 2024-09-28 RX ADMIN — ACETAMINOPHEN 650 MG: 325 TABLET ORAL at 16:37

## 2024-09-28 RX ADMIN — CYCLOBENZAPRINE HYDROCHLORIDE 5 MG: 5 TABLET, FILM COATED ORAL at 08:10

## 2024-09-28 RX ADMIN — POTASSIUM CHLORIDE, DEXTROSE MONOHYDRATE AND SODIUM CHLORIDE 100 ML/HR: 150; 5; 450 INJECTION, SOLUTION INTRAVENOUS at 00:27

## 2024-09-28 RX ADMIN — POTASSIUM CHLORIDE, DEXTROSE MONOHYDRATE AND SODIUM CHLORIDE 75 ML/HR: 150; 5; 450 INJECTION, SOLUTION INTRAVENOUS at 11:27

## 2024-09-28 RX ADMIN — POTASSIUM CHLORIDE, DEXTROSE MONOHYDRATE AND SODIUM CHLORIDE 75 ML/HR: 150; 5; 450 INJECTION, SOLUTION INTRAVENOUS at 23:17

## 2024-09-28 RX ADMIN — ACETAMINOPHEN 650 MG: 325 TABLET ORAL at 11:21

## 2024-09-28 RX ADMIN — PANTOPRAZOLE SODIUM 40 MG: 40 INJECTION, POWDER, FOR SOLUTION INTRAVENOUS at 06:18

## 2024-09-28 RX ADMIN — HYDROMORPHONE HYDROCHLORIDE 1 MG: 1 INJECTION, SOLUTION INTRAMUSCULAR; INTRAVENOUS; SUBCUTANEOUS at 19:45

## 2024-09-28 RX ADMIN — KETOROLAC TROMETHAMINE 15 MG: 15 INJECTION, SOLUTION INTRAMUSCULAR; INTRAVENOUS at 04:20

## 2024-09-28 RX ADMIN — EZETIMIBE 10 MG: 10 TABLET ORAL at 08:11

## 2024-09-28 RX ADMIN — ASPIRIN 81 MG: 81 TABLET, COATED ORAL at 08:11

## 2024-09-28 RX ADMIN — HYDROMORPHONE HYDROCHLORIDE 1 MG: 1 INJECTION, SOLUTION INTRAMUSCULAR; INTRAVENOUS; SUBCUTANEOUS at 08:11

## 2024-09-28 RX ADMIN — ENOXAPARIN SODIUM 40 MG: 40 INJECTION SUBCUTANEOUS at 08:10

## 2024-09-28 RX ADMIN — KETOROLAC TROMETHAMINE 15 MG: 15 INJECTION, SOLUTION INTRAMUSCULAR; INTRAVENOUS at 11:21

## 2024-09-28 RX ADMIN — ROSUVASTATIN CALCIUM 20 MG: 20 TABLET, FILM COATED ORAL at 08:11

## 2024-09-28 RX ADMIN — HYDROMORPHONE HYDROCHLORIDE 1 MG: 1 INJECTION, SOLUTION INTRAMUSCULAR; INTRAVENOUS; SUBCUTANEOUS at 15:23

## 2024-09-28 RX ADMIN — HYDROMORPHONE HYDROCHLORIDE 1 MG: 1 INJECTION, SOLUTION INTRAMUSCULAR; INTRAVENOUS; SUBCUTANEOUS at 11:21

## 2024-09-28 RX ADMIN — GABAPENTIN 100 MG: 100 CAPSULE ORAL at 21:00

## 2024-09-28 RX ADMIN — HYDROMORPHONE HYDROCHLORIDE 1 MG: 1 INJECTION, SOLUTION INTRAMUSCULAR; INTRAVENOUS; SUBCUTANEOUS at 03:37

## 2024-09-28 RX ADMIN — METOPROLOL SUCCINATE 25 MG: 25 TABLET, EXTENDED RELEASE ORAL at 08:11

## 2024-09-28 RX ADMIN — GABAPENTIN 100 MG: 100 CAPSULE ORAL at 08:11

## 2024-09-28 RX ADMIN — KETOROLAC TROMETHAMINE 15 MG: 15 INJECTION, SOLUTION INTRAMUSCULAR; INTRAVENOUS at 16:37

## 2024-09-28 RX ADMIN — POLYETHYLENE GLYCOL 3350 17 G: 17 POWDER, FOR SOLUTION ORAL at 08:10

## 2024-09-28 RX ADMIN — CYCLOBENZAPRINE HYDROCHLORIDE 5 MG: 5 TABLET, FILM COATED ORAL at 21:00

## 2024-09-28 ASSESSMENT — COGNITIVE AND FUNCTIONAL STATUS - GENERAL
DAILY ACTIVITIY SCORE: 20
CLIMB 3 TO 5 STEPS WITH RAILING: A LITTLE
DRESSING REGULAR LOWER BODY CLOTHING: A LITTLE
MOVING TO AND FROM BED TO CHAIR: A LITTLE
TOILETING: A LITTLE
MOVING FROM LYING ON BACK TO SITTING ON SIDE OF FLAT BED WITH BEDRAILS: A LITTLE
STANDING UP FROM CHAIR USING ARMS: A LITTLE
STANDING UP FROM CHAIR USING ARMS: A LITTLE
WALKING IN HOSPITAL ROOM: A LITTLE
TURNING FROM BACK TO SIDE WHILE IN FLAT BAD: A LITTLE
MOBILITY SCORE: 18
CLIMB 3 TO 5 STEPS WITH RAILING: A LITTLE
MOVING TO AND FROM BED TO CHAIR: A LITTLE
WALKING IN HOSPITAL ROOM: A LITTLE
MOVING FROM LYING ON BACK TO SITTING ON SIDE OF FLAT BED WITH BEDRAILS: A LITTLE
DRESSING REGULAR UPPER BODY CLOTHING: A LITTLE
HELP NEEDED FOR BATHING: A LITTLE
TURNING FROM BACK TO SIDE WHILE IN FLAT BAD: A LITTLE
MOBILITY SCORE: 18

## 2024-09-28 ASSESSMENT — PAIN SCALES - GENERAL
PAINLEVEL_OUTOF10: 8
PAINLEVEL_OUTOF10: 4
PAINLEVEL_OUTOF10: 7
PAINLEVEL_OUTOF10: 8
PAINLEVEL_OUTOF10: 5 - MODERATE PAIN
PAINLEVEL_OUTOF10: 6

## 2024-09-28 ASSESSMENT — PAIN - FUNCTIONAL ASSESSMENT
PAIN_FUNCTIONAL_ASSESSMENT: 0-10

## 2024-09-28 NOTE — PROGRESS NOTES
General Surgery/Trauma Inpatient Progress Note    Interval History:  Jatinder Keenan is a 64 y.o. male    Overnight Event:   None     Complaints:   Passing flatus  Feels well   No BM     NGT output minimal     Past Medical History:  Past Medical History:   Diagnosis Date    Acute maxillary sinusitis, unspecified 04/03/2015    Acute maxillary sinusitis    Body mass index (BMI) 37.0-37.9, adult 07/17/2019    BMI 37.0-37.9, adult    CAD (coronary artery disease)     Chest pain on breathing 09/10/2015    Chest pain on respiration    Colostomy status     Contact dermatitis due to poison ivy 06/22/2024    COPD (chronic obstructive pulmonary disease)     COVID-19 virus infection 03/16/2023    GERD (gastroesophageal reflux disease)     History of gastrointestinal hemorrhage 02/12/2018    History of pulmonary embolism 06/20/2022    Hyperlipidemia     Hypertension     Laceration of left elbow 03/16/2023    Left hemiparesis     Lumbago with sciatica, left side 07/19/2017    Acute left-sided low back pain with left-sided sciatica    Muscle pain 03/16/2023    Myocardial infarction (Multi)     Other specified abnormal findings of blood chemistry     Abnormal liver function test    Perforated diverticulum     Personal history of colonic polyps     Personal history of diseases of the skin and subcutaneous tissue 01/30/2014    History of folliculitis    Personal history of other diseases of the digestive system 03/29/2017    History of lower gastrointestinal bleeding    Personal history of other specified conditions 01/30/2014    History of headache    Personal history of other specified conditions 11/15/2018    History of epigastric pain    Polymyalgia rheumatica (Multi) 11/15/2018    Polymyalgia    TIA (transient ischemic attack)     Weakness 05/07/2018    Left-sided weakness    Wears glasses     Wears partial dentures     upper, lower bridge        Past Surgical History:  Past Surgical History:   Procedure Laterality Date  "   COLONOSCOPY  03/29/2017    Complete Colonoscopy    COLOSTOMY Bilateral 06/26/2024    CORONARY ANGIOPLASTY WITH STENT PLACEMENT      Cath Stent Placement, x2    ESOPHAGOGASTRODUODENOSCOPY  03/29/2017    Diagnostic Esophagogastroduodenoscopy    LUMBAR DISCECTOMY      MR HEAD ANGIO WO IV CONTRAST  04/30/2018    MR HEAD ANGIO WO IV CONTRAST 4/30/2018 [facility]    MR NECK ANGIO WO IV CONTRAST  04/30/2018    MR NECK ANGIO WO IV CONTRAST 4/30/2018 [facility]    SIGMOIDECTOMY  06/26/2024        Medications:  Current Outpatient Medications   Medication Instructions    acetaminophen (TYLENOL) 650 mg, oral, Every 6 hours PRN    aspirin 81 mg, oral, Daily    cyclobenzaprine (FLEXERIL) 5 mg, oral, 2 times daily PRN    ezetimibe (ZETIA) 10 mg, oral, Daily    gabapentin (NEURONTIN) 100 mg, oral, 2 times daily    metoprolol succinate XL (TOPROL-XL) 25 mg, oral, Daily    nitroglycerin (NITROSTAT) 0.4 mg, sublingual, Every 5 min PRN    pantoprazole (PROTONIX) 40 mg, oral, Daily before breakfast, Do not crush, chew, or split.    polyethylene glycol (Glycolax, Miralax) 17 gram/dose powder Take 17 g by mouth once daily. Do not fill before July 2, 2024.    rosuvastatin (CRESTOR) 20 mg, oral, Daily        Allergies:  Allergies   Allergen Reactions    Atorvastatin Unknown     He says \"it didn't agree with me\"        Family History:  Family History   Problem Relation Name Age of Onset    Diabetes Mother      Heart failure Father          Social History:  Social History     Socioeconomic History    Marital status:    Tobacco Use    Smoking status: Some Days     Current packs/day: 0.25     Average packs/day: 0.3 packs/day for 15.0 years (3.8 ttl pk-yrs)     Types: Cigarettes    Smokeless tobacco: Never   Vaping Use    Vaping status: Never Used   Substance and Sexual Activity    Alcohol use: Yes     Comment: socially    Drug use: Not Currently     Types: Marijuana     Social Determinants of Health     Financial " Resource Strain: Low Risk  (9/27/2024)    Overall Financial Resource Strain (CARDIA)     Difficulty of Paying Living Expenses: Not very hard   Food Insecurity: Patient Declined (9/26/2024)    Hunger Vital Sign     Worried About Running Out of Food in the Last Year: Patient declined     Ran Out of Food in the Last Year: Patient declined   Transportation Needs: No Transportation Needs (9/27/2024)    PRAPARE - Transportation     Lack of Transportation (Medical): No     Lack of Transportation (Non-Medical): No   Intimate Partner Violence: Not At Risk (9/26/2024)    Humiliation, Afraid, Rape, and Kick questionnaire     Fear of Current or Ex-Partner: No     Emotionally Abused: No     Physically Abused: No     Sexually Abused: No   Housing Stability: Low Risk  (9/27/2024)    Housing Stability Vital Sign     Unable to Pay for Housing in the Last Year: No     Number of Times Moved in the Last Year: 1     Homeless in the Last Year: No        Review of Systems:  A complete 12 point review of systems was performed. It is otherwise negative except as noted in the above interval history.     Vital Signs:  Vitals:    09/28/24 0825   BP: 134/81   Pulse: 65   Resp: 18   Temp: 36.1 °C (96.9 °F)   SpO2: 94%      Body mass index is 28.52 kg/m².      Physical Exam:  General: No acute distress.   Neuro: Alert and oriented ×3. Follows commands.  Face: Atraumatic, no visible skin lesion.   Head: Atraumatic  Eyes: Pupils equal reactive to light. Extraocular motions intact.  Ears: Hears normal speaking voice.  Mouth, Nose, Throat: Mucous membranes moist.  Normal dentition.  Neck: Supple. No visible masses.  Breast: Not examined.   Lung: Patent airway and no labored breath.   Vascular: Palpable radial pulses bilaterally.  Abdomen: soft, incision clean. Drain serous.   Rectal and Perianal: Not examined.   Genitourinary: Not examined.   Musculoskeletal: Moves all extremities.  Normal range of motion.  Extremities: No cyanosis. No edema.    Lymphatic: No palpable lymph nodes.  Skin: No rashes or lesions.  Psychological: Normal affect      Laboratory Values:  CBC:   Lab Results   Component Value Date    WBC 7.2 09/28/2024    RBC 3.97 (L) 09/28/2024    HGB 13.2 (L) 09/28/2024    HCT 38.0 (L) 09/28/2024     09/28/2024       RFP:   Lab Results   Component Value Date     09/28/2024    K 3.8 09/28/2024     09/28/2024    CO2 27 09/28/2024    BUN 10 09/28/2024    CREATININE 0.63 09/28/2024    CALCIUM 8.6 09/28/2024        LFTs:   Lab Results   Component Value Date    PROT 5.8 (L) 09/28/2024    ALBUMIN 3.1 (L) 09/28/2024    BILITOT 0.9 09/28/2024    ALKPHOS 33 09/28/2024    AST 10 09/28/2024    ALT 7 (L) 09/28/2024            Imaging:  I have personally reviewed the images and the radiologist's report.  Colonoscopy Screening; Average Risk Patient    Result Date: 9/23/2024  Table formatting from the original result was not included. Impression The ileocecal valve and cecum appeared normal. Abnormal mucosa Findings The ileocecal valve and cecum appeared normal. Edematous mucosa in the cecum Atrophic and erythematous mucosa in the rectum. Rectal stump 15 cm;  Recommendation Await pathology results Repeat screening colonoscopy in 10 years, due: 9/21/2034 Indication Personal history of colonic polyps Staff Staff Role Pallavi Nunn MD Proceduralist Medications See Anesthesia Record. Preprocedure A history and physical has been performed, and patient medication allergies have been reviewed. The patient's tolerance of previous anesthesia has been reviewed. The risks and benefits of the procedure and the sedation options and risks were discussed with the patient. All questions were answered and informed consent obtained. Details of the Procedure The patient underwent monitored anesthesia care, which was administered by an anesthesia professional. The patient's blood pressure, ECG, ETCO2, heart rate, level of consciousness, oxygen and respirations  were monitored throughout the procedure. A digital rectal exam was performed. The scope was introduced through the anus and stoma and advanced to the cecum. Retroflexion was performed in the rectum. The quality of bowel preparation was evaluated using the [place] Bowel Preparation Scale with scores of: right colon = 3, transverse colon = 3, left colon = 3. The total BBPS score was 9. Bowel prep was adequate. The patient experienced no blood loss. The procedure was not difficult. The patient tolerated the procedure well. There were no apparent adverse events. Events Procedure Events Event Event Time ENDO SCOPE IN TIME 9/23/2024  7:32 AM ENDO CECUM REACHED 9/23/2024  7:36 AM Specimens ID Type Source Tests Collected by Time 1 : APPENDICEAL ORFICE ABNORMAL MUCOSA Tissue COLON - CECUM BIOPSY SURGICAL PATHOLOGY EXAM Pallavi Nunn MD 9/23/2024 0738 Procedure Location [facility] [place] [address] [place] [address] [phone] Referring Provider Pallavi Nunn MD Procedure Provider MD Pallavi Hurtado         Assessment:  This is a 64 y.o. male who is   POD4, colostomy closure   Doing well      Plan:  Supportive care   NGT out and start on clears   Keep drain for now   Prophylaxis   Cover for Dr Nunn over weekend      Arlin Spencer MD [facility]  General Surgery  Office: [phone]  Fax:     [phone]  9:36 AM   09/28/24

## 2024-09-28 NOTE — CARE PLAN
The patient's goals for the shift include pain control and rest     Problem: Pain - Adult  Goal: Verbalizes/displays adequate comfort level or baseline comfort level  Outcome: Progressing     Problem: Safety - Adult  Goal: Free from fall injury  Outcome: Progressing     Problem: Discharge Planning  Goal: Discharge to home or other facility with appropriate resources  Outcome: Progressing     Problem: Chronic Conditions and Co-morbidities  Goal: Patient's chronic conditions and co-morbidity symptoms are monitored and maintained or improved  Outcome: Progressing     Problem: Indwelling Catheter Maintenance  Goal: I will have no complications from indwelling catheter  Outcome: Progressing     The clinical goals for the shift include pain control

## 2024-09-28 NOTE — PROGRESS NOTES
Jatinder Keenan is a 64 y.o. male on day 4 of admission presenting with No Principal Problem: There is no principal problem currently on the Problem List. Please update the Problem List and refresh..      Subjective   The patient is sitting up to chair this morning.  NG tube to LIS.  Nursing plans to clamp the NG for ambulation this morning.  Denies flatulence.       Objective     Last Recorded Vitals  /79 (BP Location: Left arm, Patient Position: Sitting)   Pulse 69   Temp 36.2 °C (97.2 °F) (Temporal)   Resp 18   Wt 82.6 kg (182 lb 1.6 oz)   SpO2 94%   Intake/Output last 3 Shifts:    Intake/Output Summary (Last 24 hours) at 9/28/2024 1519  Last data filed at 9/28/2024 0623  Gross per 24 hour   Intake 20 ml   Output 1060 ml   Net -1040 ml       Admission Weight  Weight: 77 kg (169 lb 12.1 oz) (09/24/24 0626)    Daily Weight  09/25/24 : 82.6 kg (182 lb 1.6 oz)    Image Results  Colonoscopy Screening; Average Risk Patient  Table formatting from the original result was not included.  Impression  The ileocecal valve and cecum appeared normal.  Abnormal mucosa    Findings  The ileocecal valve and cecum appeared normal.  Edematous mucosa in the cecum  Atrophic and erythematous mucosa in the rectum. Rectal stump 15 cm;       Recommendation  Await pathology results   Repeat screening colonoscopy in 10 years, due: 9/21/2034     Indication  Personal history of colonic polyps    Staff  Staff Role   Pallavi Nunn MD Proceduralist     Medications  See Anesthesia Record.     Preprocedure  A history and physical has been performed, and patient medication   allergies have been reviewed. The patient's tolerance of previous   anesthesia has been reviewed. The risks and benefits of the procedure and   the sedation options and risks were discussed with the patient. All   questions were answered and informed consent obtained.    Details of the Procedure  The patient underwent monitored anesthesia care, which was  administered by   an anesthesia professional. The patient's blood pressure, ECG, ETCO2,   heart rate, level of consciousness, oxygen and respirations were monitored   throughout the procedure. A digital rectal exam was performed. The scope   was introduced through the anus and stoma and advanced to the cecum.   Retroflexion was performed in the rectum. The quality of bowel preparation   was evaluated using the [place] Bowel Preparation Scale with scores of:   right colon = 3, transverse colon = 3, left colon = 3. The total BBPS   score was 9. Bowel prep was adequate. The patient experienced no blood   loss. The procedure was not difficult. The patient tolerated the procedure   well. There were no apparent adverse events.     Events  Procedure Events   Event Event Time   ENDO SCOPE IN TIME 9/23/2024  7:32 AM   ENDO CECUM REACHED 9/23/2024  7:36 AM     Specimens  ID Type Source Tests Collected by Time   1 : APPENDICEAL ORFICE ABNORMAL MUCOSA Tissue COLON - CECUM BIOPSY   SURGICAL PATHOLOGY EXAM Pallavi Nunn MD 9/23/2024 0738     Procedure Location  [facility] [place]  [address] [facility] [id number] [phone]    Referring Provider  Pallavi Nunn MD    Procedure Provider  MD Pallavi Hurtado      Physical Exam  HENT:      Mouth/Throat:      Mouth: Mucous membranes are dry.   Eyes:      Extraocular Movements: Extraocular movements intact.   Cardiovascular:      Rate and Rhythm: Regular rhythm.   Pulmonary:      Breath sounds: Normal breath sounds.   Abdominal:      General: There is no distension.      Palpations: Abdomen is soft.      Tenderness: There is abdominal tenderness.      Comments: Surgical site: c/d/i, drain in place    Musculoskeletal:      Right lower leg: No edema.      Left lower leg: No edema.   Skin:     Capillary Refill: Capillary refill takes less than 2 seconds.   Neurological:      Mental Status: He is alert and oriented to person,  place, and time.   Psychiatric:         Mood and Affect: Mood normal.         Relevant Results      Results for orders placed or performed during the hospital encounter of 09/24/24 (from the past 24 hour(s))   Comprehensive Metabolic Panel   Result Value Ref Range    Glucose 117 (H) 74 - 99 mg/dL    Sodium 136 136 - 145 mmol/L    Potassium 3.8 3.5 - 5.3 mmol/L    Chloride 102 98 - 107 mmol/L    Bicarbonate 27 21 - 32 mmol/L    Anion Gap 11 10 - 20 mmol/L    Urea Nitrogen 10 6 - 23 mg/dL    Creatinine 0.63 0.50 - 1.30 mg/dL    eGFR >90 >60 mL/min/1.73m*2    Calcium 8.6 8.6 - 10.3 mg/dL    Albumin 3.1 (L) 3.4 - 5.0 g/dL    Alkaline Phosphatase 33 33 - 136 U/L    Total Protein 5.8 (L) 6.4 - 8.2 g/dL    AST 10 9 - 39 U/L    Bilirubin, Total 0.9 0.0 - 1.2 mg/dL    ALT 7 (L) 10 - 52 U/L   CBC and Auto Differential   Result Value Ref Range    WBC 7.2 4.4 - 11.3 x10*3/uL    nRBC 0.0 0.0 - 0.0 /100 WBCs    RBC 3.97 (L) 4.50 - 5.90 x10*6/uL    Hemoglobin 13.2 (L) 13.5 - 17.5 g/dL    Hematocrit 38.0 (L) 41.0 - 52.0 %    MCV 96 80 - 100 fL    MCH 33.2 26.0 - 34.0 pg    MCHC 34.7 32.0 - 36.0 g/dL    RDW 12.1 11.5 - 14.5 %    Platelets 213 150 - 450 x10*3/uL    Neutrophils % 63.9 40.0 - 80.0 %    Immature Granulocytes %, Automated 0.3 0.0 - 0.9 %    Lymphocytes % 19.5 13.0 - 44.0 %    Monocytes % 9.8 2.0 - 10.0 %    Eosinophils % 6.1 0.0 - 6.0 %    Basophils % 0.4 0.0 - 2.0 %    Neutrophils Absolute 4.62 1.20 - 7.70 x10*3/uL    Immature Granulocytes Absolute, Automated 0.02 0.00 - 0.70 x10*3/uL    Lymphocytes Absolute 1.41 1.20 - 4.80 x10*3/uL    Monocytes Absolute 0.71 0.10 - 1.00 x10*3/uL    Eosinophils Absolute 0.44 0.00 - 0.70 x10*3/uL    Basophils Absolute 0.03 0.00 - 0.10 x10*3/uL            Scheduled medications  acetaminophen, 650 mg, oral, q6h  aspirin, 81 mg, oral, Daily  cyclobenzaprine, 5 mg, oral, BID  enoxaparin, 40 mg, subcutaneous, Daily  ezetimibe, 10 mg, oral, Daily  gabapentin, 100 mg, oral, BID  ketorolac, 15  mg, intravenous, q6h  metoprolol succinate XL, 25 mg, oral, Daily  pantoprazole, 40 mg, oral, Daily before breakfast  polyethylene glycol, 17 g, oral, Daily  rosuvastatin, 20 mg, oral, Daily      Continuous medications  potassium udmnixh-Z0-5.45%NaCl, 75 mL/hr, Last Rate: 75 mL/hr (09/28/24 1127)      PRN medications  PRN medications: HYDROmorphone, HYDROmorphone, HYDROmorphone, ondansetron **OR** ondansetron, phenoL     Assessment/Plan                  Jatinder Keenan is a 63 y.o. male Ex smoker with a history of CAD s/p MI and PCI to RCA x 2 (2016), HTN, HLD, remote PE, and CVA (not on Eliquis) presented to the hospital for colostomy closure     #Sp closure colostomy, flex sig, appendectomy, Cystoscopy, bilateral ureteral catheter placement 9/24  - management per primary   - Ancef complete  - flagyl discontinued  - Pain not managed with morphine, will switch for Dilaudid and de escalate when appropriate   - zofran as needed  -Will continue D5 half-normal with 20 of K until taking p.o. well  -Ng tube removed and clear liquid diet ordered  -Dietitian consult     #CAD  - Resumed crestor, zetia  - hold ASA     #HTN  -Resumed metoprolol  -Monitor BP    #DVT prophylaxis: Lovenox subcu    Dispo: Anticipate discharge on October 1, 2024.              Estela Bocanegra, APRN-CNP

## 2024-09-28 NOTE — PROGRESS NOTES
09/28/24 1459   Discharge Planning   Expected Discharge Disposition Home H  ([facility] unable to accept. Referrals sent to [facility] and [facility]. Awaiting responses)

## 2024-09-29 VITALS
HEIGHT: 67 IN | OXYGEN SATURATION: 94 % | BODY MASS INDEX: 28.94 KG/M2 | RESPIRATION RATE: 16 BRPM | SYSTOLIC BLOOD PRESSURE: 124 MMHG | TEMPERATURE: 97.7 F | WEIGHT: 184.4 LBS | HEART RATE: 74 BPM | DIASTOLIC BLOOD PRESSURE: 82 MMHG

## 2024-09-29 LAB
ALBUMIN SERPL BCP-MCNC: 3.1 G/DL (ref 3.4–5)
ALP SERPL-CCNC: 41 U/L (ref 33–136)
ALT SERPL W P-5'-P-CCNC: 9 U/L (ref 10–52)
ANION GAP SERPL CALC-SCNC: 11 MMOL/L (ref 10–20)
AST SERPL W P-5'-P-CCNC: 11 U/L (ref 9–39)
BASOPHILS # BLD AUTO: 0.02 X10*3/UL (ref 0–0.1)
BASOPHILS NFR BLD AUTO: 0.3 %
BILIRUB SERPL-MCNC: 0.8 MG/DL (ref 0–1.2)
BUN SERPL-MCNC: 8 MG/DL (ref 6–23)
CALCIUM SERPL-MCNC: 8.6 MG/DL (ref 8.6–10.3)
CHLORIDE SERPL-SCNC: 101 MMOL/L (ref 98–107)
CO2 SERPL-SCNC: 26 MMOL/L (ref 21–32)
CREAT SERPL-MCNC: 0.73 MG/DL (ref 0.5–1.3)
EGFRCR SERPLBLD CKD-EPI 2021: >90 ML/MIN/1.73M*2
EOSINOPHIL # BLD AUTO: 0.42 X10*3/UL (ref 0–0.7)
EOSINOPHIL NFR BLD AUTO: 5.9 %
ERYTHROCYTE [DISTWIDTH] IN BLOOD BY AUTOMATED COUNT: 12 % (ref 11.5–14.5)
GLUCOSE SERPL-MCNC: 104 MG/DL (ref 74–99)
HCT VFR BLD AUTO: 36.4 % (ref 41–52)
HGB BLD-MCNC: 12.5 G/DL (ref 13.5–17.5)
IMM GRANULOCYTES # BLD AUTO: 0.04 X10*3/UL (ref 0–0.7)
IMM GRANULOCYTES NFR BLD AUTO: 0.6 % (ref 0–0.9)
LYMPHOCYTES # BLD AUTO: 1.26 X10*3/UL (ref 1.2–4.8)
LYMPHOCYTES NFR BLD AUTO: 17.6 %
MCH RBC QN AUTO: 33.1 PG (ref 26–34)
MCHC RBC AUTO-ENTMCNC: 34.3 G/DL (ref 32–36)
MCV RBC AUTO: 96 FL (ref 80–100)
MONOCYTES # BLD AUTO: 0.64 X10*3/UL (ref 0.1–1)
MONOCYTES NFR BLD AUTO: 8.9 %
NEUTROPHILS # BLD AUTO: 4.78 X10*3/UL (ref 1.2–7.7)
NEUTROPHILS NFR BLD AUTO: 66.7 %
NRBC BLD-RTO: 0 /100 WBCS (ref 0–0)
PLATELET # BLD AUTO: 209 X10*3/UL (ref 150–450)
POTASSIUM SERPL-SCNC: 4 MMOL/L (ref 3.5–5.3)
PROT SERPL-MCNC: 5.6 G/DL (ref 6.4–8.2)
RBC # BLD AUTO: 3.78 X10*6/UL (ref 4.5–5.9)
SODIUM SERPL-SCNC: 134 MMOL/L (ref 136–145)
WBC # BLD AUTO: 7.2 X10*3/UL (ref 4.4–11.3)

## 2024-09-29 PROCEDURE — 80053 COMPREHEN METABOLIC PANEL: CPT | Performed by: STUDENT IN AN ORGANIZED HEALTH CARE EDUCATION/TRAINING PROGRAM

## 2024-09-29 PROCEDURE — 2500000001 HC RX 250 WO HCPCS SELF ADMINISTERED DRUGS (ALT 637 FOR MEDICARE OP): Performed by: SURGERY

## 2024-09-29 PROCEDURE — 85025 COMPLETE CBC W/AUTO DIFF WBC: CPT | Performed by: STUDENT IN AN ORGANIZED HEALTH CARE EDUCATION/TRAINING PROGRAM

## 2024-09-29 PROCEDURE — 2500000004 HC RX 250 GENERAL PHARMACY W/ HCPCS (ALT 636 FOR OP/ED): Performed by: PHYSICIAN ASSISTANT

## 2024-09-29 PROCEDURE — 2500000001 HC RX 250 WO HCPCS SELF ADMINISTERED DRUGS (ALT 637 FOR MEDICARE OP): Performed by: NURSE PRACTITIONER

## 2024-09-29 PROCEDURE — 1100000001 HC PRIVATE ROOM DAILY

## 2024-09-29 PROCEDURE — 2500000001 HC RX 250 WO HCPCS SELF ADMINISTERED DRUGS (ALT 637 FOR MEDICARE OP): Performed by: PHYSICIAN ASSISTANT

## 2024-09-29 PROCEDURE — 2500000002 HC RX 250 W HCPCS SELF ADMINISTERED DRUGS (ALT 637 FOR MEDICARE OP, ALT 636 FOR OP/ED): Performed by: NURSE PRACTITIONER

## 2024-09-29 PROCEDURE — 2500000004 HC RX 250 GENERAL PHARMACY W/ HCPCS (ALT 636 FOR OP/ED): Performed by: NURSE PRACTITIONER

## 2024-09-29 PROCEDURE — 99024 POSTOP FOLLOW-UP VISIT: CPT | Performed by: SURGERY

## 2024-09-29 PROCEDURE — 36415 COLL VENOUS BLD VENIPUNCTURE: CPT | Performed by: STUDENT IN AN ORGANIZED HEALTH CARE EDUCATION/TRAINING PROGRAM

## 2024-09-29 PROCEDURE — 99233 SBSQ HOSP IP/OBS HIGH 50: CPT | Performed by: NURSE PRACTITIONER

## 2024-09-29 RX ORDER — HYDROCODONE BITARTRATE AND ACETAMINOPHEN 5; 325 MG/1; MG/1
1 TABLET ORAL EVERY 4 HOURS PRN
Status: DISCONTINUED | OUTPATIENT
Start: 2024-09-29 | End: 2024-09-29

## 2024-09-29 RX ORDER — MORPHINE SULFATE 2 MG/ML
2 INJECTION, SOLUTION INTRAMUSCULAR; INTRAVENOUS ONCE
Status: COMPLETED | OUTPATIENT
Start: 2024-09-30 | End: 2024-09-29

## 2024-09-29 RX ORDER — OXYCODONE HYDROCHLORIDE 5 MG/1
5 TABLET ORAL EVERY 4 HOURS PRN
Status: DISCONTINUED | OUTPATIENT
Start: 2024-09-29 | End: 2024-09-30 | Stop reason: HOSPADM

## 2024-09-29 RX ORDER — HYDROCODONE BITARTRATE AND ACETAMINOPHEN 5; 325 MG/1; MG/1
2 TABLET ORAL EVERY 4 HOURS PRN
Status: DISCONTINUED | OUTPATIENT
Start: 2024-09-29 | End: 2024-09-29

## 2024-09-29 RX ORDER — HYDROMORPHONE HYDROCHLORIDE 1 MG/ML
1 INJECTION, SOLUTION INTRAMUSCULAR; INTRAVENOUS; SUBCUTANEOUS ONCE
Status: COMPLETED | OUTPATIENT
Start: 2024-09-29 | End: 2024-09-29

## 2024-09-29 RX ORDER — OXYCODONE HYDROCHLORIDE 5 MG/1
10 TABLET ORAL EVERY 4 HOURS PRN
Status: DISCONTINUED | OUTPATIENT
Start: 2024-09-29 | End: 2024-09-30 | Stop reason: HOSPADM

## 2024-09-29 RX ADMIN — HYDROMORPHONE HYDROCHLORIDE 1 MG: 1 INJECTION, SOLUTION INTRAMUSCULAR; INTRAVENOUS; SUBCUTANEOUS at 12:20

## 2024-09-29 RX ADMIN — METOPROLOL SUCCINATE 25 MG: 25 TABLET, EXTENDED RELEASE ORAL at 08:09

## 2024-09-29 RX ADMIN — CYCLOBENZAPRINE HYDROCHLORIDE 5 MG: 5 TABLET, FILM COATED ORAL at 08:09

## 2024-09-29 RX ADMIN — GABAPENTIN 100 MG: 100 CAPSULE ORAL at 08:11

## 2024-09-29 RX ADMIN — HYDROMORPHONE HYDROCHLORIDE 1 MG: 1 INJECTION, SOLUTION INTRAMUSCULAR; INTRAVENOUS; SUBCUTANEOUS at 00:08

## 2024-09-29 RX ADMIN — EZETIMIBE 10 MG: 10 TABLET ORAL at 08:09

## 2024-09-29 RX ADMIN — ROSUVASTATIN CALCIUM 20 MG: 20 TABLET, FILM COATED ORAL at 08:09

## 2024-09-29 RX ADMIN — ACETAMINOPHEN 650 MG: 325 TABLET ORAL at 22:33

## 2024-09-29 RX ADMIN — PANTOPRAZOLE SODIUM 40 MG: 40 TABLET, DELAYED RELEASE ORAL at 05:34

## 2024-09-29 RX ADMIN — ACETAMINOPHEN 650 MG: 325 TABLET ORAL at 16:05

## 2024-09-29 RX ADMIN — HYDROMORPHONE HYDROCHLORIDE 1 MG: 1 INJECTION, SOLUTION INTRAMUSCULAR; INTRAVENOUS; SUBCUTANEOUS at 08:11

## 2024-09-29 RX ADMIN — HYDROMORPHONE HYDROCHLORIDE 1 MG: 1 INJECTION, SOLUTION INTRAMUSCULAR; INTRAVENOUS; SUBCUTANEOUS at 04:11

## 2024-09-29 RX ADMIN — GABAPENTIN 100 MG: 100 CAPSULE ORAL at 20:26

## 2024-09-29 RX ADMIN — POLYETHYLENE GLYCOL 3350 17 G: 17 POWDER, FOR SOLUTION ORAL at 08:09

## 2024-09-29 RX ADMIN — OXYCODONE HYDROCHLORIDE 10 MG: 5 TABLET ORAL at 20:26

## 2024-09-29 RX ADMIN — ACETAMINOPHEN 650 MG: 325 TABLET ORAL at 04:11

## 2024-09-29 RX ADMIN — MORPHINE SULFATE 2 MG: 2 INJECTION, SOLUTION INTRAMUSCULAR; INTRAVENOUS at 23:57

## 2024-09-29 RX ADMIN — CYCLOBENZAPRINE HYDROCHLORIDE 5 MG: 5 TABLET, FILM COATED ORAL at 20:26

## 2024-09-29 RX ADMIN — ENOXAPARIN SODIUM 40 MG: 40 INJECTION SUBCUTANEOUS at 08:09

## 2024-09-29 RX ADMIN — OXYCODONE HYDROCHLORIDE 10 MG: 5 TABLET ORAL at 16:06

## 2024-09-29 RX ADMIN — ASPIRIN 81 MG: 81 TABLET, COATED ORAL at 08:09

## 2024-09-29 ASSESSMENT — COGNITIVE AND FUNCTIONAL STATUS - GENERAL
HELP NEEDED FOR BATHING: A LITTLE
MOBILITY SCORE: 24
DAILY ACTIVITIY SCORE: 21
DAILY ACTIVITIY SCORE: 21
DRESSING REGULAR LOWER BODY CLOTHING: A LITTLE
DRESSING REGULAR UPPER BODY CLOTHING: A LITTLE
MOBILITY SCORE: 24
HELP NEEDED FOR BATHING: A LITTLE
DRESSING REGULAR LOWER BODY CLOTHING: A LITTLE
DRESSING REGULAR UPPER BODY CLOTHING: A LITTLE

## 2024-09-29 ASSESSMENT — PAIN SCALES - GENERAL
PAINLEVEL_OUTOF10: 0 - NO PAIN
PAINLEVEL_OUTOF10: 7
PAINLEVEL_OUTOF10: 7
PAINLEVEL_OUTOF10: 8
PAINLEVEL_OUTOF10: 8
PAINLEVEL_OUTOF10: 5 - MODERATE PAIN
PAINLEVEL_OUTOF10: 8
PAINLEVEL_OUTOF10: 8
PAINLEVEL_OUTOF10: 0 - NO PAIN
PAINLEVEL_OUTOF10: 10 - WORST POSSIBLE PAIN

## 2024-09-29 ASSESSMENT — PAIN - FUNCTIONAL ASSESSMENT
PAIN_FUNCTIONAL_ASSESSMENT: 0-10

## 2024-09-29 ASSESSMENT — PAIN DESCRIPTION - LOCATION
LOCATION: ABDOMEN
LOCATION: ABDOMEN

## 2024-09-29 NOTE — PROGRESS NOTES
Jatinder Keenan is a 64 y.o. male on day 5 of admission presenting with No Principal Problem: There is no principal problem currently on the Problem List. Please update the Problem List and refresh..      Subjective   The patient is sitting up to chair this morning.  The NG tube was removed and the patient tolerated a soft diet.     Objective     Last Recorded Vitals  /80   Pulse 78   Temp 37.4 °C (99.3 °F)   Resp 16   Wt 82.6 kg (182 lb 1.6 oz)   SpO2 94%   Intake/Output last 3 Shifts:    Intake/Output Summary (Last 24 hours) at 9/29/2024 1456  Last data filed at 9/29/2024 1411  Gross per 24 hour   Intake 390 ml   Output 1020 ml   Net -630 ml       Admission Weight  Weight: 77 kg (169 lb 12.1 oz) (09/24/24 0626)    Daily Weight  09/25/24 : 82.6 kg (182 lb 1.6 oz)    Image Results  Colonoscopy Screening; Average Risk Patient  Table formatting from the original result was not included.  Impression  The ileocecal valve and cecum appeared normal.  Abnormal mucosa    Findings  The ileocecal valve and cecum appeared normal.  Edematous mucosa in the cecum  Atrophic and erythematous mucosa in the rectum. Rectal stump 15 cm;       Recommendation  Await pathology results   Repeat screening colonoscopy in 10 years, due: 9/21/2034     Indication  Personal history of colonic polyps    Staff  Staff Role   Pallavi Nunn MD Proceduralist     Medications  See Anesthesia Record.     Preprocedure  A history and physical has been performed, and patient medication   allergies have been reviewed. The patient's tolerance of previous   anesthesia has been reviewed. The risks and benefits of the procedure and   the sedation options and risks were discussed with the patient. All   questions were answered and informed consent obtained.    Details of the Procedure  The patient underwent monitored anesthesia care, which was administered by   an anesthesia professional. The patient's blood pressure, ECG, ETCO2,   heart rate,  level of consciousness, oxygen and respirations were monitored   throughout the procedure. A digital rectal exam was performed. The scope   was introduced through the anus and stoma and advanced to the cecum.   Retroflexion was performed in the rectum. The quality of bowel preparation   was evaluated using the [place] Bowel Preparation Scale with scores of:   right colon = 3, transverse colon = 3, left colon = 3. The total BBPS   score was 9. Bowel prep was adequate. The patient experienced no blood   loss. The procedure was not difficult. The patient tolerated the procedure   well. There were no apparent adverse events.     Events  Procedure Events   Event Event Time   ENDO SCOPE IN TIME 9/23/2024  7:32 AM   ENDO CECUM REACHED 9/23/2024  7:36 AM     Specimens  ID Type Source Tests Collected by Time   1 : APPENDICEAL ORFICE ABNORMAL MUCOSA Tissue COLON - CECUM BIOPSY   SURGICAL PATHOLOGY EXAM Pallavi Nunn MD 9/23/2024 0738     Procedure Location  [facility] [place]  [address] [address] [facility] [id number] [phone]    Referring Provider  Pallavi Nunn MD    Procedure Provider  MD Pallavi Hurtado      Physical Exam  HENT:      Mouth/Throat:      Mouth: Mucous membranes are dry.   Eyes:      Extraocular Movements: Extraocular movements intact.   Cardiovascular:      Rate and Rhythm: Regular rhythm.   Pulmonary:      Breath sounds: Normal breath sounds.   Abdominal:      General: There is no distension.      Palpations: Abdomen is soft.      Tenderness: There is abdominal tenderness.      Comments: Surgical site: c/d/i, drain in place    Musculoskeletal:      Right lower leg: No edema.      Left lower leg: No edema.   Skin:     Capillary Refill: Capillary refill takes less than 2 seconds.   Neurological:      Mental Status: He is alert and oriented to person, place, and time.   Psychiatric:         Mood and Affect: Mood normal.         Relevant Results      Results  for orders placed or performed during the hospital encounter of 09/24/24 (from the past 24 hour(s))   Comprehensive Metabolic Panel   Result Value Ref Range    Glucose 104 (H) 74 - 99 mg/dL    Sodium 134 (L) 136 - 145 mmol/L    Potassium 4.0 3.5 - 5.3 mmol/L    Chloride 101 98 - 107 mmol/L    Bicarbonate 26 21 - 32 mmol/L    Anion Gap 11 10 - 20 mmol/L    Urea Nitrogen 8 6 - 23 mg/dL    Creatinine 0.73 0.50 - 1.30 mg/dL    eGFR >90 >60 mL/min/1.73m*2    Calcium 8.6 8.6 - 10.3 mg/dL    Albumin 3.1 (L) 3.4 - 5.0 g/dL    Alkaline Phosphatase 41 33 - 136 U/L    Total Protein 5.6 (L) 6.4 - 8.2 g/dL    AST 11 9 - 39 U/L    Bilirubin, Total 0.8 0.0 - 1.2 mg/dL    ALT 9 (L) 10 - 52 U/L   CBC and Auto Differential   Result Value Ref Range    WBC 7.2 4.4 - 11.3 x10*3/uL    nRBC 0.0 0.0 - 0.0 /100 WBCs    RBC 3.78 (L) 4.50 - 5.90 x10*6/uL    Hemoglobin 12.5 (L) 13.5 - 17.5 g/dL    Hematocrit 36.4 (L) 41.0 - 52.0 %    MCV 96 80 - 100 fL    MCH 33.1 26.0 - 34.0 pg    MCHC 34.3 32.0 - 36.0 g/dL    RDW 12.0 11.5 - 14.5 %    Platelets 209 150 - 450 x10*3/uL    Neutrophils % 66.7 40.0 - 80.0 %    Immature Granulocytes %, Automated 0.6 0.0 - 0.9 %    Lymphocytes % 17.6 13.0 - 44.0 %    Monocytes % 8.9 2.0 - 10.0 %    Eosinophils % 5.9 0.0 - 6.0 %    Basophils % 0.3 0.0 - 2.0 %    Neutrophils Absolute 4.78 1.20 - 7.70 x10*3/uL    Immature Granulocytes Absolute, Automated 0.04 0.00 - 0.70 x10*3/uL    Lymphocytes Absolute 1.26 1.20 - 4.80 x10*3/uL    Monocytes Absolute 0.64 0.10 - 1.00 x10*3/uL    Eosinophils Absolute 0.42 0.00 - 0.70 x10*3/uL    Basophils Absolute 0.02 0.00 - 0.10 x10*3/uL            Scheduled medications  acetaminophen, 650 mg, oral, q6h  aspirin, 81 mg, oral, Daily  cyclobenzaprine, 5 mg, oral, BID  enoxaparin, 40 mg, subcutaneous, Daily  ezetimibe, 10 mg, oral, Daily  gabapentin, 100 mg, oral, BID  metoprolol succinate XL, 25 mg, oral, Daily  pantoprazole, 40 mg, oral, Daily before breakfast  polyethylene glycol, 17  g, oral, Daily  rosuvastatin, 20 mg, oral, Daily      Continuous medications       PRN medications  PRN medications: ondansetron **OR** ondansetron, oxyCODONE, oxyCODONE     Assessment/Plan                  Jatinder Keenan is a 63 y.o. male Ex smoker with a history of CAD s/p MI and PCI to RCA x 2 (2016), HTN, HLD, remote PE, and CVA (not on Eliquis) presented to the hospital for colostomy closure     #Sp closure colostomy, flex sig, appendectomy, Cystoscopy, bilateral ureteral catheter placement 9/24  - management per primary   - Ancef complete  - flagyl discontinued  - Pain not managed with morphine, will switch for Dilaudid and de escalate when appropriate   - zofran as needed  -Will stop ue D5 half-normal with 20 of K until taking p.o. well when taking p.o. well.  -Patient tolerates a soft diet.  -He continues to have pain at the drain insertion site, nursing to notify surgery of inflammation.  -Will de-escalate pain management by discontinuing IV pain meds.  Will continue the oxycodone.  -Dietitian consult     #CAD  - Resumed crestor, zetia  - hold ASA     #HTN  -Resumed metoprolol  -Monitor BP    #DVT prophylaxis: Lovenox subcu    Dispo: Surgery may clear the patient to go home with [facility] on 9/30/2024.              МАРИНА Arita-CNP

## 2024-09-29 NOTE — PROGRESS NOTES
General Surgery/Trauma Inpatient Progress Note    Interval History:  Jatinder Keenan is a 64 y.o. male     Overnight Event:   None     Complaints:   Did well with clears   Passing flatus   Still a little bloated     Past Medical History:  Past Medical History:   Diagnosis Date    Acute maxillary sinusitis, unspecified 04/03/2015    Acute maxillary sinusitis    Body mass index (BMI) 37.0-37.9, adult 07/17/2019    BMI 37.0-37.9, adult    CAD (coronary artery disease)     Chest pain on breathing 09/10/2015    Chest pain on respiration    Colostomy status     Contact dermatitis due to poison ivy 06/22/2024    COPD (chronic obstructive pulmonary disease)     COVID-19 virus infection 03/16/2023    GERD (gastroesophageal reflux disease)     History of gastrointestinal hemorrhage 02/12/2018    History of pulmonary embolism 06/20/2022    Hyperlipidemia     Hypertension     Laceration of left elbow 03/16/2023    Left hemiparesis     Lumbago with sciatica, left side 07/19/2017    Acute left-sided low back pain with left-sided sciatica    Muscle pain 03/16/2023    Myocardial infarction (Multi)     Other specified abnormal findings of blood chemistry     Abnormal liver function test    Perforated diverticulum     Personal history of colonic polyps     Personal history of diseases of the skin and subcutaneous tissue 01/30/2014    History of folliculitis    Personal history of other diseases of the digestive system 03/29/2017    History of lower gastrointestinal bleeding    Personal history of other specified conditions 01/30/2014    History of headache    Personal history of other specified conditions 11/15/2018    History of epigastric pain    Polymyalgia rheumatica (Multi) 11/15/2018    Polymyalgia    TIA (transient ischemic attack)     Weakness 05/07/2018    Left-sided weakness    Wears glasses     Wears partial dentures     upper, lower bridge        Past Surgical History:  Past Surgical History:   Procedure Laterality  "Date    COLONOSCOPY  03/29/2017    Complete Colonoscopy    COLOSTOMY Bilateral 06/26/2024    CORONARY ANGIOPLASTY WITH STENT PLACEMENT      Cath Stent Placement, x2    ESOPHAGOGASTRODUODENOSCOPY  03/29/2017    Diagnostic Esophagogastroduodenoscopy    LUMBAR DISCECTOMY      MR HEAD ANGIO WO IV CONTRAST  04/30/2018    MR HEAD ANGIO WO IV CONTRAST 4/30/2018 [facility]    MR NECK ANGIO WO IV CONTRAST  04/30/2018    MR NECK ANGIO WO IV CONTRAST 4/30/2018 [facility]    SIGMOIDECTOMY  06/26/2024        Medications:  Current Outpatient Medications   Medication Instructions    acetaminophen (TYLENOL) 650 mg, oral, Every 6 hours PRN    aspirin 81 mg, oral, Daily    cyclobenzaprine (FLEXERIL) 5 mg, oral, 2 times daily PRN    ezetimibe (ZETIA) 10 mg, oral, Daily    gabapentin (NEURONTIN) 100 mg, oral, 2 times daily    metoprolol succinate XL (TOPROL-XL) 25 mg, oral, Daily    nitroglycerin (NITROSTAT) 0.4 mg, sublingual, Every 5 min PRN    pantoprazole (PROTONIX) 40 mg, oral, Daily before breakfast, Do not crush, chew, or split.    polyethylene glycol (Glycolax, Miralax) 17 gram/dose powder Take 17 g by mouth once daily. Do not fill before July 2, 2024.    rosuvastatin (CRESTOR) 20 mg, oral, Daily        Allergies:  Allergies   Allergen Reactions    Atorvastatin Unknown     He says \"it didn't agree with me\"        Family History:  Family History   Problem Relation Name Age of Onset    Diabetes Mother      Heart failure Father          Social History:  Social History     Socioeconomic History    Marital status:    Tobacco Use    Smoking status: Some Days     Current packs/day: 0.25     Average packs/day: 0.3 packs/day for 15.0 years (3.8 ttl pk-yrs)     Types: Cigarettes    Smokeless tobacco: Never   Vaping Use    Vaping status: Never Used   Substance and Sexual Activity    Alcohol use: Yes     Comment: socially    Drug use: Not Currently     Types: Marijuana     Social Determinants of Health "     Financial Resource Strain: Low Risk  (9/27/2024)    Overall Financial Resource Strain (CARDIA)     Difficulty of Paying Living Expenses: Not very hard   Food Insecurity: Patient Declined (9/26/2024)    Hunger Vital Sign     Worried About Running Out of Food in the Last Year: Patient declined     Ran Out of Food in the Last Year: Patient declined   Transportation Needs: No Transportation Needs (9/27/2024)    PRAPARE - Transportation     Lack of Transportation (Medical): No     Lack of Transportation (Non-Medical): No   Intimate Partner Violence: Not At Risk (9/26/2024)    Humiliation, Afraid, Rape, and Kick questionnaire     Fear of Current or Ex-Partner: No     Emotionally Abused: No     Physically Abused: No     Sexually Abused: No   Housing Stability: Low Risk  (9/27/2024)    Housing Stability Vital Sign     Unable to Pay for Housing in the Last Year: No     Number of Times Moved in the Last Year: 1     Homeless in the Last Year: No        Review of Systems:  A complete 12 point review of systems was performed. It is otherwise negative except as noted in the above interval history.     Vital Signs:  Vitals:    09/29/24 0414   BP: 132/83   Pulse: 70   Resp: 18   Temp: 36.8 °C (98.2 °F)   SpO2: 93%      Body mass index is 28.52 kg/m².      Physical Exam:  General: No acute distress.   Neuro: Alert and oriented ×3. Follows commands.  Face: Atraumatic, no visible skin lesion.   Head: Atraumatic  Eyes: Pupils equal reactive to light. Extraocular motions intact.  Ears: Hears normal speaking voice.  Mouth, Nose, Throat: Mucous membranes moist.  Normal dentition.  Neck: Supple. No visible masses.  Breast: Not examined.   Lung: Patent airway and no labored breath.   Vascular: Palpable radial pulses bilaterally.  Abdomen: soft, NT, ND. Incision clean.   Rectal and Perianal: Not examined.   Genitourinary: Not examined.   Musculoskeletal: Moves all extremities.  Normal range of motion.  Extremities: No cyanosis. No edema.    Lymphatic: No palpable lymph nodes.  Skin: No rashes or lesions.  Psychological: Normal affect      Laboratory Values:  CBC:   Lab Results   Component Value Date    WBC 7.2 09/29/2024    RBC 3.78 (L) 09/29/2024    HGB 12.5 (L) 09/29/2024    HCT 36.4 (L) 09/29/2024     09/29/2024       RFP:   Lab Results   Component Value Date     (L) 09/29/2024    K 4.0 09/29/2024     09/29/2024    CO2 26 09/29/2024    BUN 8 09/29/2024    CREATININE 0.73 09/29/2024    CALCIUM 8.6 09/29/2024        LFTs:   Lab Results   Component Value Date    PROT 5.6 (L) 09/29/2024    ALBUMIN 3.1 (L) 09/29/2024    BILITOT 0.8 09/29/2024    ALKPHOS 41 09/29/2024    AST 11 09/29/2024    ALT 9 (L) 09/29/2024            Imaging:  I have personally reviewed the images and the radiologist's report.  Colonoscopy Screening; Average Risk Patient    Result Date: 9/23/2024  Table formatting from the original result was not included. Impression The ileocecal valve and cecum appeared normal. Abnormal mucosa Findings The ileocecal valve and cecum appeared normal. Edematous mucosa in the cecum Atrophic and erythematous mucosa in the rectum. Rectal stump 15 cm;  Recommendation Await pathology results Repeat screening colonoscopy in 10 years, due: 9/21/2034 Indication Personal history of colonic polyps Staff Staff Role Pallavi Nunn MD Proceduralist Medications See Anesthesia Record. Preprocedure A history and physical has been performed, and patient medication allergies have been reviewed. The patient's tolerance of previous anesthesia has been reviewed. The risks and benefits of the procedure and the sedation options and risks were discussed with the patient. All questions were answered and informed consent obtained. Details of the Procedure The patient underwent monitored anesthesia care, which was administered by an anesthesia professional. The patient's blood pressure, ECG, ETCO2, heart rate, level of consciousness, oxygen and  respirations were monitored throughout the procedure. A digital rectal exam was performed. The scope was introduced through the anus and stoma and advanced to the cecum. Retroflexion was performed in the rectum. The quality of bowel preparation was evaluated using the [place] Bowel Preparation Scale with scores of: right colon = 3, transverse colon = 3, left colon = 3. The total BBPS score was 9. Bowel prep was adequate. The patient experienced no blood loss. The procedure was not difficult. The patient tolerated the procedure well. There were no apparent adverse events. Events Procedure Events Event Event Time ENDO SCOPE IN TIME 9/23/2024  7:32 AM ENDO CECUM REACHED 9/23/2024  7:36 AM Specimens ID Type Source Tests Collected by Time 1 : APPENDICEAL ORFICE ABNORMAL MUCOSA Tissue COLON - CECUM BIOPSY SURGICAL PATHOLOGY EXAM Pallavi Nunn MD 9/23/2024 0738 Procedure Location [facility] [place] [address] [place] [address] [phone] Referring Provider Pallavi Nunn MD Procedure Provider MD Pallavi Hurtado         Assessment:  This is a 64 y.o. male who is    POD5, colostomy closure   Doing well      Plan:  Supportive care   Soft diet   Prophylaxis   Should be able to go home tomorrow   Cover Dr Nunn for weekend      Arlin Spencer MD [facility]  General Surgery  Office: [phone]  Fax:     [phone]  8:51 AM   09/29/24

## 2024-09-29 NOTE — CARE PLAN
Problem: Nutrition  Goal: Less than 5 days NPO/clear liquids  Outcome: Progressing  Goal: Oral intake greater than 50%  Outcome: Progressing  Goal: Oral intake greater 75%  Outcome: Progressing  Goal: Consume prescribed supplement  Outcome: Progressing  Goal: Adequate PO fluid intake  Outcome: Progressing  Goal: Nutrition support goals are met within 48 hrs  Outcome: Progressing  Goal: Nutrition support is meeting 75% of nutrient needs  Outcome: Progressing  Goal: Tube feed tolerance  Outcome: Progressing  Goal: BG  mg/dL  Outcome: Progressing  Goal: Lab values WNL  Outcome: Progressing  Goal: Electrolytes WNL  Outcome: Progressing  Goal: Promote healing  Outcome: Progressing  Goal: Maintain stable weight  Outcome: Progressing  Goal: Reduce weight from edema/fluid  Outcome: Progressing  Goal: Gradual weight gain  Outcome: Progressing  Goal: Improve ostomy output  Outcome: Progressing     Problem: Pain - Adult  Goal: Verbalizes/displays adequate comfort level or baseline comfort level  Outcome: Progressing     Problem: Safety - Adult  Goal: Free from fall injury  Outcome: Progressing     Problem: Discharge Planning  Goal: Discharge to home or other facility with appropriate resources  Outcome: Progressing     Problem: Chronic Conditions and Co-morbidities  Goal: Patient's chronic conditions and co-morbidity symptoms are monitored and maintained or improved  Outcome: Progressing     Problem: Diabetes  Goal: Achieve decreasing blood glucose levels by end of shift  Outcome: Progressing  Goal: Increase stability of blood glucose readings by end of shift  Outcome: Progressing  Goal: Decrease in ketones present in urine by end of shift  Outcome: Progressing  Goal: Maintain electrolyte levels within acceptable range throughout shift  Outcome: Progressing  Goal: Maintain glucose levels >70mg/dl to <250mg/dl throughout shift  Outcome: Progressing  Goal: No changes in neurological exam by end of shift  Outcome:  Progressing  Goal: Learn about and adhere to nutrition recommendations by end of shift  Outcome: Progressing  Goal: Vital signs within normal range for age by end of shift  Outcome: Progressing  Goal: Increase self care and/or family involovement by end of shift  Outcome: Progressing  Goal: Receive DSME education by end of shift  Outcome: Progressing     Problem: Skin  Goal: Decreased wound size/increased tissue granulation at next dressing change  Outcome: Progressing  Goal: Participates in plan/prevention/treatment measures  Outcome: Progressing  Goal: Prevent/manage excess moisture  Outcome: Progressing  Goal: Prevent/minimize sheer/friction injuries  Outcome: Progressing  Goal: Promote/optimize nutrition  Outcome: Progressing  Goal: Promote skin healing  Outcome: Progressing     Problem: Pain  Goal: Takes deep breaths with improved pain control throughout the shift  Outcome: Progressing  Goal: Turns in bed with improved pain control throughout the shift  Outcome: Progressing  Goal: Walks with improved pain control throughout the shift  Outcome: Progressing  Goal: Performs ADL's with improved pain control throughout shift  Outcome: Progressing  Goal: Participates in PT with improved pain control throughout the shift  Outcome: Progressing  Goal: Free from opioid side effects throughout the shift  Outcome: Progressing  Goal: Free from acute confusion related to pain meds throughout the shift  Outcome: Progressing     Problem: Indwelling Catheter Maintenance  Goal: I will have no complications from indwelling catheter  Outcome: Progressing   The patient's goals for the shift include  pain control    The clinical goals for the shift include Patient will have pain controlled at 7 or less this shift    Over the shift, the patient did not make progress toward the following goals. Barriers to progression include pain. Recommendations to address these barriers include pain meds, heat packs, repositioning, and rest.

## 2024-09-30 ENCOUNTER — PHARMACY VISIT (OUTPATIENT)
Dept: PHARMACY | Facility: CLINIC | Age: 64
End: 2024-09-30
Payer: COMMERCIAL

## 2024-09-30 VITALS
TEMPERATURE: 97.9 F | DIASTOLIC BLOOD PRESSURE: 77 MMHG | WEIGHT: 184.4 LBS | HEART RATE: 67 BPM | OXYGEN SATURATION: 92 % | HEIGHT: 67 IN | RESPIRATION RATE: 16 BRPM | BODY MASS INDEX: 28.94 KG/M2 | SYSTOLIC BLOOD PRESSURE: 123 MMHG

## 2024-09-30 LAB
ALBUMIN SERPL BCP-MCNC: 3.4 G/DL (ref 3.4–5)
ALP SERPL-CCNC: 50 U/L (ref 33–136)
ALT SERPL W P-5'-P-CCNC: 12 U/L (ref 10–52)
ANION GAP SERPL CALC-SCNC: 12 MMOL/L (ref 10–20)
AST SERPL W P-5'-P-CCNC: 15 U/L (ref 9–39)
BASOPHILS # BLD AUTO: 0.04 X10*3/UL (ref 0–0.1)
BASOPHILS NFR BLD AUTO: 0.4 %
BILIRUB SERPL-MCNC: 0.8 MG/DL (ref 0–1.2)
BUN SERPL-MCNC: 11 MG/DL (ref 6–23)
CALCIUM SERPL-MCNC: 9 MG/DL (ref 8.6–10.3)
CHLORIDE SERPL-SCNC: 97 MMOL/L (ref 98–107)
CO2 SERPL-SCNC: 28 MMOL/L (ref 21–32)
CREAT SERPL-MCNC: 0.79 MG/DL (ref 0.5–1.3)
EGFRCR SERPLBLD CKD-EPI 2021: >90 ML/MIN/1.73M*2
EOSINOPHIL # BLD AUTO: 0.42 X10*3/UL (ref 0–0.7)
EOSINOPHIL NFR BLD AUTO: 4.6 %
ERYTHROCYTE [DISTWIDTH] IN BLOOD BY AUTOMATED COUNT: 12 % (ref 11.5–14.5)
GLUCOSE SERPL-MCNC: 96 MG/DL (ref 74–99)
HCT VFR BLD AUTO: 37.3 % (ref 41–52)
HGB BLD-MCNC: 12.8 G/DL (ref 13.5–17.5)
IMM GRANULOCYTES # BLD AUTO: 0.03 X10*3/UL (ref 0–0.7)
IMM GRANULOCYTES NFR BLD AUTO: 0.3 % (ref 0–0.9)
LYMPHOCYTES # BLD AUTO: 1.58 X10*3/UL (ref 1.2–4.8)
LYMPHOCYTES NFR BLD AUTO: 17.5 %
MCH RBC QN AUTO: 33 PG (ref 26–34)
MCHC RBC AUTO-ENTMCNC: 34.3 G/DL (ref 32–36)
MCV RBC AUTO: 96 FL (ref 80–100)
MONOCYTES # BLD AUTO: 0.86 X10*3/UL (ref 0.1–1)
MONOCYTES NFR BLD AUTO: 9.5 %
NEUTROPHILS # BLD AUTO: 6.11 X10*3/UL (ref 1.2–7.7)
NEUTROPHILS NFR BLD AUTO: 67.7 %
NRBC BLD-RTO: 0 /100 WBCS (ref 0–0)
PLATELET # BLD AUTO: 252 X10*3/UL (ref 150–450)
POTASSIUM SERPL-SCNC: 3.9 MMOL/L (ref 3.5–5.3)
PROT SERPL-MCNC: 6.3 G/DL (ref 6.4–8.2)
RBC # BLD AUTO: 3.88 X10*6/UL (ref 4.5–5.9)
SODIUM SERPL-SCNC: 133 MMOL/L (ref 136–145)
WBC # BLD AUTO: 9 X10*3/UL (ref 4.4–11.3)

## 2024-09-30 PROCEDURE — 2500000002 HC RX 250 W HCPCS SELF ADMINISTERED DRUGS (ALT 637 FOR MEDICARE OP, ALT 636 FOR OP/ED): Performed by: NURSE PRACTITIONER

## 2024-09-30 PROCEDURE — 2500000004 HC RX 250 GENERAL PHARMACY W/ HCPCS (ALT 636 FOR OP/ED): Performed by: INTERNAL MEDICINE

## 2024-09-30 PROCEDURE — 80053 COMPREHEN METABOLIC PANEL: CPT | Performed by: STUDENT IN AN ORGANIZED HEALTH CARE EDUCATION/TRAINING PROGRAM

## 2024-09-30 PROCEDURE — 2500000001 HC RX 250 WO HCPCS SELF ADMINISTERED DRUGS (ALT 637 FOR MEDICARE OP): Performed by: SURGERY

## 2024-09-30 PROCEDURE — 85025 COMPLETE CBC W/AUTO DIFF WBC: CPT | Performed by: STUDENT IN AN ORGANIZED HEALTH CARE EDUCATION/TRAINING PROGRAM

## 2024-09-30 PROCEDURE — RXMED WILLOW AMBULATORY MEDICATION CHARGE

## 2024-09-30 PROCEDURE — 2500000004 HC RX 250 GENERAL PHARMACY W/ HCPCS (ALT 636 FOR OP/ED): Performed by: NURSE PRACTITIONER

## 2024-09-30 PROCEDURE — 36415 COLL VENOUS BLD VENIPUNCTURE: CPT | Performed by: STUDENT IN AN ORGANIZED HEALTH CARE EDUCATION/TRAINING PROGRAM

## 2024-09-30 PROCEDURE — 2500000001 HC RX 250 WO HCPCS SELF ADMINISTERED DRUGS (ALT 637 FOR MEDICARE OP): Performed by: NURSE PRACTITIONER

## 2024-09-30 PROCEDURE — 2500000004 HC RX 250 GENERAL PHARMACY W/ HCPCS (ALT 636 FOR OP/ED): Performed by: PHYSICIAN ASSISTANT

## 2024-09-30 PROCEDURE — 2500000001 HC RX 250 WO HCPCS SELF ADMINISTERED DRUGS (ALT 637 FOR MEDICARE OP): Performed by: PHYSICIAN ASSISTANT

## 2024-09-30 RX ORDER — OXYCODONE HYDROCHLORIDE 5 MG/1
5 TABLET ORAL EVERY 6 HOURS PRN
Qty: 15 TABLET | Refills: 0 | Status: SHIPPED | OUTPATIENT
Start: 2024-09-30 | End: 2024-10-04 | Stop reason: SDUPTHER

## 2024-09-30 RX ORDER — ENOXAPARIN SODIUM 100 MG/ML
40 INJECTION SUBCUTANEOUS DAILY
Qty: 5 EACH | Refills: 0 | Status: SHIPPED | OUTPATIENT
Start: 2024-09-30 | End: 2024-10-05

## 2024-09-30 RX ORDER — DOCUSATE SODIUM 100 MG/1
200 CAPSULE, LIQUID FILLED ORAL
Qty: 30 CAPSULE | Refills: 0 | Status: SHIPPED | OUTPATIENT
Start: 2024-09-30

## 2024-09-30 RX ADMIN — PANTOPRAZOLE SODIUM 40 MG: 40 TABLET, DELAYED RELEASE ORAL at 05:09

## 2024-09-30 RX ADMIN — GABAPENTIN 100 MG: 100 CAPSULE ORAL at 08:24

## 2024-09-30 RX ADMIN — CYCLOBENZAPRINE HYDROCHLORIDE 5 MG: 5 TABLET, FILM COATED ORAL at 08:24

## 2024-09-30 RX ADMIN — OXYCODONE HYDROCHLORIDE 10 MG: 5 TABLET ORAL at 01:05

## 2024-09-30 RX ADMIN — ROSUVASTATIN CALCIUM 20 MG: 20 TABLET, FILM COATED ORAL at 08:24

## 2024-09-30 RX ADMIN — METOPROLOL SUCCINATE 25 MG: 25 TABLET, EXTENDED RELEASE ORAL at 08:24

## 2024-09-30 RX ADMIN — ENOXAPARIN SODIUM 40 MG: 40 INJECTION SUBCUTANEOUS at 08:24

## 2024-09-30 RX ADMIN — OXYCODONE HYDROCHLORIDE 10 MG: 5 TABLET ORAL at 09:23

## 2024-09-30 RX ADMIN — OXYCODONE HYDROCHLORIDE 10 MG: 5 TABLET ORAL at 05:09

## 2024-09-30 RX ADMIN — ASPIRIN 81 MG: 81 TABLET, COATED ORAL at 08:24

## 2024-09-30 RX ADMIN — EZETIMIBE 10 MG: 10 TABLET ORAL at 08:24

## 2024-09-30 RX ADMIN — ACETAMINOPHEN 650 MG: 325 TABLET ORAL at 05:09

## 2024-09-30 RX ADMIN — POLYETHYLENE GLYCOL 3350 17 G: 17 POWDER, FOR SOLUTION ORAL at 08:24

## 2024-09-30 ASSESSMENT — PAIN SCALES - GENERAL
PAINLEVEL_OUTOF10: 7

## 2024-09-30 ASSESSMENT — COGNITIVE AND FUNCTIONAL STATUS - GENERAL
DRESSING REGULAR UPPER BODY CLOTHING: A LITTLE
MOBILITY SCORE: 24
DAILY ACTIVITIY SCORE: 21
DRESSING REGULAR LOWER BODY CLOTHING: A LITTLE
HELP NEEDED FOR BATHING: A LITTLE

## 2024-09-30 ASSESSMENT — PAIN - FUNCTIONAL ASSESSMENT
PAIN_FUNCTIONAL_ASSESSMENT: 0-10
PAIN_FUNCTIONAL_ASSESSMENT: 0-10

## 2024-09-30 ASSESSMENT — PAIN DESCRIPTION - LOCATION
LOCATION: ABDOMEN

## 2024-09-30 NOTE — NURSING NOTE
Discharge instructions reviewed with patient and wife. No questions at this time. Education given for new homegoing medications with type, uses, and most common side effects. Patient verbalized understanding. Handout on ostomy reversal given. IV removed. Discharged home in stable condition.

## 2024-09-30 NOTE — DISCHARGE SUMMARY
Discharge Diagnosis  colostomy    Issues Requiring Follow-Up  Colostomy closure    Test Results Pending At Discharge  Pending Labs       Order Current Status    Surgical Pathology Exam In process            Hospital Course   Usual post op recovery    Pertinent Physical Exam At Time of Discharge  Physical Exam lungs clear  Heart RRR    Home Medications     Medication List      START taking these medications     acetaminophen 325 mg tablet; Commonly known as: Tylenol; Take 2 tablets   (650 mg) by mouth every 6 hours if needed for mild pain (1 - 3).   cyclobenzaprine 5 mg tablet; Commonly known as: Flexeril; Take 1 tablet   (5 mg) by mouth 2 times a day as needed for muscle spasms for up to 10   days.   gabapentin 100 mg capsule; Commonly known as: Neurontin; Take 1 capsule   (100 mg) by mouth 2 times a day for 10 days.   pantoprazole 40 mg EC tablet; Commonly known as: ProtoNix; Take 1 tablet   (40 mg) by mouth once daily in the morning. Take before meals. Do not   crush, chew, or split.   polyethylene glycol 17 gram/dose powder; Commonly known as: Glycolax,   Miralax; Take 17 g by mouth once daily. Do not fill before July 2, 2024.     CONTINUE taking these medications     aspirin 81 mg EC tablet   ezetimibe 10 mg tablet; Commonly known as: Zetia; Take 1 tablet (10 mg)   by mouth once daily.   metoprolol succinate XL 25 mg 24 hr tablet; Commonly known as:   Toprol-XL; Take 1 tablet (25 mg) by mouth once daily.   nitroglycerin 0.4 mg SL tablet; Commonly known as: Nitrostat   rosuvastatin 20 mg tablet; Commonly known as: Crestor; Take 1 tablet (20   mg) by mouth once daily.       Outpatient Follow-Up  Future Appointments   Date Time Provider Department [place]   10/17/2024 10:15 AM Pallavi Nunn MD [id number] [facility]   1/10/2025  9:00 AM Raza Sharma DO [id number] [facility]       Pallavi Nunn MD

## 2024-09-30 NOTE — CARE PLAN
Problem: Nutrition  Goal: Nutrition support goals are met within 48 hrs  Outcome: Progressing  Goal: Nutrition support is meeting 75% of nutrient needs  Outcome: Progressing     Problem: Skin  Goal: Decreased wound size/increased tissue granulation at next dressing change  Outcome: Progressing  Goal: Participates in plan/prevention/treatment measures  Outcome: Progressing  Goal: Prevent/manage excess moisture  Outcome: Progressing  Goal: Prevent/minimize sheer/friction injuries  Outcome: Progressing  Goal: Promote/optimize nutrition  Outcome: Progressing  Goal: Promote skin healing  Outcome: Progressing     Problem: Nutrition  Goal: Less than 5 days NPO/clear liquids  Outcome: Met  Goal: Oral intake greater than 50%  Outcome: Met  Goal: Oral intake greater 75%  Outcome: Met  Goal: Consume prescribed supplement  Outcome: Met  Goal: Adequate PO fluid intake  Outcome: Met

## 2024-10-02 LAB
LABORATORY COMMENT REPORT: NORMAL
PATH REPORT.FINAL DX SPEC: NORMAL
PATH REPORT.GROSS SPEC: NORMAL
PATH REPORT.RELEVANT HX SPEC: NORMAL
PATH REPORT.TOTAL CANCER: NORMAL

## 2024-10-02 NOTE — SIGNIFICANT EVENT
Follow Up Phone Call    Outgoing phone call    Spoke to: Jatinder Keenan Relationship:self   Phone number: [phone]      Outcome: I left a message on answering machine   No chief complaint on file.         Diagnosis:Not applicable

## 2024-10-03 NOTE — SIGNIFICANT EVENT
Follow Up Phone Call    Outgoing phone call    Spoke to: Jatinder Keenan Relationship:self   Phone number: [phone]      Outcome: contacted patient/ family   No chief complaint on file.         Diagnosis:Not applicable    States he is feeling better. Bowels are moving. No further questions or concerns.

## 2024-10-04 ENCOUNTER — TELEPHONE (OUTPATIENT)
Dept: PRIMARY CARE | Facility: CLINIC | Age: 64
End: 2024-10-04
Payer: COMMERCIAL

## 2024-10-04 DIAGNOSIS — Z93.3 COLOSTOMY STATUS (MULTI): ICD-10-CM

## 2024-10-04 DIAGNOSIS — R21 FACIAL RASH: Primary | ICD-10-CM

## 2024-10-04 RX ORDER — CYCLOBENZAPRINE HCL 5 MG
5 TABLET ORAL 2 TIMES DAILY PRN
Qty: 20 TABLET | Refills: 0 | Status: SHIPPED | OUTPATIENT
Start: 2024-10-04 | End: 2024-10-14

## 2024-10-04 RX ORDER — PREDNISONE 20 MG/1
40 TABLET ORAL DAILY
Qty: 10 TABLET | Refills: 0 | Status: SHIPPED | OUTPATIENT
Start: 2024-10-04 | End: 2024-10-09

## 2024-10-04 RX ORDER — OXYCODONE HYDROCHLORIDE 5 MG/1
5 TABLET ORAL EVERY 6 HOURS PRN
Qty: 28 TABLET | Refills: 0 | Status: SHIPPED | OUTPATIENT
Start: 2024-10-04 | End: 2024-10-11

## 2024-10-04 RX ORDER — GABAPENTIN 100 MG/1
100 CAPSULE ORAL 2 TIMES DAILY
Qty: 20 CAPSULE | Refills: 0 | Status: SHIPPED | OUTPATIENT
Start: 2024-10-04 | End: 2024-10-14

## 2024-10-04 NOTE — TELEPHONE ENCOUNTER
Pippa with [facility] calling from patients home.  States she has some concerns, please call her at [phone]

## 2024-10-07 DIAGNOSIS — Z79.4 TYPE 2 DIABETES MELLITUS WITHOUT COMPLICATION, WITH LONG-TERM CURRENT USE OF INSULIN (MULTI): ICD-10-CM

## 2024-10-07 DIAGNOSIS — G45.9 TRANSIENT ISCHEMIC ATTACK (TIA): ICD-10-CM

## 2024-10-07 DIAGNOSIS — J44.0 CHRONIC OBSTRUCTIVE PULMONARY DISEASE WITH ACUTE LOWER RESPIRATORY INFECTION (MULTI): Primary | ICD-10-CM

## 2024-10-07 DIAGNOSIS — E11.9 TYPE 2 DIABETES MELLITUS WITHOUT COMPLICATION, WITH LONG-TERM CURRENT USE OF INSULIN (MULTI): ICD-10-CM

## 2024-10-17 ENCOUNTER — APPOINTMENT (OUTPATIENT)
Dept: SURGERY | Facility: CLINIC | Age: 64
End: 2024-10-17
Payer: COMMERCIAL

## 2024-10-17 VITALS
OXYGEN SATURATION: 96 % | HEIGHT: 67 IN | WEIGHT: 173.8 LBS | HEART RATE: 63 BPM | SYSTOLIC BLOOD PRESSURE: 146 MMHG | BODY MASS INDEX: 27.28 KG/M2 | DIASTOLIC BLOOD PRESSURE: 87 MMHG | TEMPERATURE: 97.5 F

## 2024-10-17 DIAGNOSIS — Z98.890 HISTORY OF COLOSTOMY REVERSAL: Primary | ICD-10-CM

## 2024-10-17 PROCEDURE — 1111F DSCHRG MED/CURRENT MED MERGE: CPT | Performed by: SURGERY

## 2024-10-17 PROCEDURE — 3079F DIAST BP 80-89 MM HG: CPT | Performed by: SURGERY

## 2024-10-17 PROCEDURE — 3048F LDL-C <100 MG/DL: CPT | Performed by: SURGERY

## 2024-10-17 PROCEDURE — 3008F BODY MASS INDEX DOCD: CPT | Performed by: SURGERY

## 2024-10-17 PROCEDURE — 1159F MED LIST DOCD IN RCRD: CPT | Performed by: SURGERY

## 2024-10-17 PROCEDURE — 99024 POSTOP FOLLOW-UP VISIT: CPT | Performed by: SURGERY

## 2024-10-17 PROCEDURE — 3077F SYST BP >= 140 MM HG: CPT | Performed by: SURGERY

## 2024-10-17 PROCEDURE — 1123F ACP DISCUSS/DSCN MKR DOCD: CPT | Performed by: SURGERY

## 2024-10-17 NOTE — PROGRESS NOTES
Subjective   Patient ID: Jatinder Keenan is a 64 y.o. male who presents for Post-op (POV colostomy closure ).  HPI  This is a patient who is here for follow-up visit after his colostomy closure.  He is doing well eating normally and moving his bowels without any difficulty.  He asked if it was necessary to continue both the MiraLAX and a stool softener and since his stools seem to be soft I advised him to cut back on the MiraLAX.  Review of Systems 10 point review is otherwise negative    Objective   Physical Exam abdomen is flat soft and nontender the midline incision is completely healed the left lower quadrant incision is almost healed it is filling in and granulating.    Assessment/Plan his wife will change that dressing in the left lower quadrant every 3 days with Aquacel and they will follow-up with me in 2 to 3 months.  Again encouraged him to stop smoking.           Pallavi Nunn MD 10/17/24 3:26 PM

## 2024-10-24 ENCOUNTER — TELEPHONE (OUTPATIENT)
Dept: PRIMARY CARE | Facility: CLINIC | Age: 64
End: 2024-10-24
Payer: COMMERCIAL

## 2024-10-24 NOTE — TELEPHONE ENCOUNTER
Patient wants to be discharged from Home care.  They need your approval to discharge early.  Wounds look good, she feels patient is doing well.

## 2024-11-21 ENCOUNTER — APPOINTMENT (OUTPATIENT)
Dept: SURGERY | Facility: CLINIC | Age: 64
End: 2024-11-21
Payer: COMMERCIAL

## 2024-11-21 VITALS
BODY MASS INDEX: 27.62 KG/M2 | WEIGHT: 176 LBS | DIASTOLIC BLOOD PRESSURE: 82 MMHG | OXYGEN SATURATION: 92 % | SYSTOLIC BLOOD PRESSURE: 134 MMHG | HEIGHT: 67 IN | HEART RATE: 70 BPM | TEMPERATURE: 97.3 F

## 2024-11-21 DIAGNOSIS — Z98.890 HISTORY OF COLOSTOMY REVERSAL: Primary | ICD-10-CM

## 2024-11-21 PROCEDURE — 99024 POSTOP FOLLOW-UP VISIT: CPT | Performed by: SURGERY

## 2024-11-21 PROCEDURE — 3079F DIAST BP 80-89 MM HG: CPT | Performed by: SURGERY

## 2024-11-21 PROCEDURE — 3048F LDL-C <100 MG/DL: CPT | Performed by: SURGERY

## 2024-11-21 PROCEDURE — 1123F ACP DISCUSS/DSCN MKR DOCD: CPT | Performed by: SURGERY

## 2024-11-21 PROCEDURE — 3008F BODY MASS INDEX DOCD: CPT | Performed by: SURGERY

## 2024-11-21 PROCEDURE — 3075F SYST BP GE 130 - 139MM HG: CPT | Performed by: SURGERY

## 2024-11-21 PROCEDURE — 1159F MED LIST DOCD IN RCRD: CPT | Performed by: SURGERY

## 2024-11-21 NOTE — PROGRESS NOTES
Subjective   Patient ID: Jatinder Keenan is a 64 y.o. male who presents for Post-op (POV colostomy closure ).  HPI  This is a pleasant gentleman who had a colostomy closure done in September and is doing very well.  He is very active as far as walking but he has restricted his lifting to 10 pounds.  He is itching to start doing more and wanted to know when he could start lifting more.  Review of Systems  He is moving his bowels and has no difficulty with eating or bowel movements.  Objective   Physical Exam  Abdomen is flat and soft the midline scar is intact to the colostomy site is intact.  He has no tenderness.  Assessment/Plan I advised him to move up to 20 pounds and if he tolerates that he can go up from there but I would still wait on doing any core exercises or heavy core work until in the spring.  Follow-up as needed           Pallavi Nunn MD 11/21/24 10:24 AM

## 2025-01-10 ENCOUNTER — APPOINTMENT (OUTPATIENT)
Dept: PRIMARY CARE | Facility: CLINIC | Age: 65
End: 2025-01-10
Payer: COMMERCIAL

## 2025-01-10 VITALS
SYSTOLIC BLOOD PRESSURE: 132 MMHG | BODY MASS INDEX: 28.56 KG/M2 | DIASTOLIC BLOOD PRESSURE: 78 MMHG | OXYGEN SATURATION: 98 % | HEIGHT: 67 IN | HEART RATE: 59 BPM | WEIGHT: 182 LBS

## 2025-01-10 DIAGNOSIS — Z00.00 ROUTINE GENERAL MEDICAL EXAMINATION AT HEALTH CARE FACILITY: Primary | ICD-10-CM

## 2025-01-10 DIAGNOSIS — E11.9 TYPE 2 DIABETES MELLITUS WITHOUT COMPLICATION, WITHOUT LONG-TERM CURRENT USE OF INSULIN (MULTI): ICD-10-CM

## 2025-01-10 DIAGNOSIS — K57.20 DIVERTICULITIS OF COLON WITH PERFORATION: ICD-10-CM

## 2025-01-10 DIAGNOSIS — G45.9 TRANSIENT ISCHEMIC ATTACK (TIA): ICD-10-CM

## 2025-01-10 DIAGNOSIS — E78.2 MIXED HYPERLIPIDEMIA: ICD-10-CM

## 2025-01-10 DIAGNOSIS — K51.40 PSEUDOPOLYPOSIS OF COLON WITHOUT COMPLICATION, UNSPECIFIED PART OF COLON (MULTI): ICD-10-CM

## 2025-01-10 DIAGNOSIS — K21.9 GASTROESOPHAGEAL REFLUX DISEASE WITHOUT ESOPHAGITIS: ICD-10-CM

## 2025-01-10 DIAGNOSIS — E55.9 VITAMIN D DEFICIENCY: ICD-10-CM

## 2025-01-10 DIAGNOSIS — I10 PRIMARY HYPERTENSION: ICD-10-CM

## 2025-01-10 DIAGNOSIS — I25.110 CORONARY ARTERY DISEASE INVOLVING NATIVE CORONARY ARTERY OF NATIVE HEART WITH UNSTABLE ANGINA PECTORIS: ICD-10-CM

## 2025-01-10 DIAGNOSIS — J44.0 CHRONIC OBSTRUCTIVE PULMONARY DISEASE WITH ACUTE LOWER RESPIRATORY INFECTION (MULTI): ICD-10-CM

## 2025-01-10 DIAGNOSIS — G81.94 LEFT HEMIPARESIS (MULTI): ICD-10-CM

## 2025-01-10 PROBLEM — I27.82 CHRONIC PULMONARY EMBOLISM WITHOUT ACUTE COR PULMONALE, UNSPECIFIED PULMONARY EMBOLISM TYPE (MULTI): Status: RESOLVED | Noted: 2024-07-10 | Resolved: 2025-01-10

## 2025-01-10 LAB — POC HEMOGLOBIN A1C: 5.6 % (ref 4.2–6.5)

## 2025-01-10 RX ORDER — EZETIMIBE 10 MG/1
10 TABLET ORAL DAILY
Qty: 90 TABLET | Refills: 3 | Status: SHIPPED | OUTPATIENT
Start: 2025-01-10 | End: 2026-01-10

## 2025-01-10 RX ORDER — ROSUVASTATIN CALCIUM 20 MG/1
20 TABLET, COATED ORAL DAILY
Qty: 90 TABLET | Refills: 3 | Status: SHIPPED | OUTPATIENT
Start: 2025-01-10 | End: 2026-01-10

## 2025-01-10 RX ORDER — OXYCODONE HYDROCHLORIDE 5 MG/1
5 TABLET ORAL EVERY 8 HOURS PRN
Qty: 21 TABLET | Refills: 0 | Status: SHIPPED | OUTPATIENT
Start: 2025-01-10 | End: 2025-01-17

## 2025-01-10 RX ORDER — METOPROLOL SUCCINATE 25 MG/1
25 TABLET, EXTENDED RELEASE ORAL DAILY
Qty: 90 TABLET | Refills: 3 | Status: SHIPPED | OUTPATIENT
Start: 2025-01-10 | End: 2026-01-10

## 2025-01-10 ASSESSMENT — ENCOUNTER SYMPTOMS
OCCASIONAL FEELINGS OF UNSTEADINESS: 0
LOSS OF SENSATION IN FEET: 0
DEPRESSION: 0

## 2025-01-10 ASSESSMENT — ACTIVITIES OF DAILY LIVING (ADL)
MANAGING_FINANCES: INDEPENDENT
GROCERY_SHOPPING: INDEPENDENT
TAKING_MEDICATION: INDEPENDENT
DOING_HOUSEWORK: INDEPENDENT
BATHING: INDEPENDENT
DRESSING: INDEPENDENT

## 2025-01-10 ASSESSMENT — PATIENT HEALTH QUESTIONNAIRE - PHQ9
SUM OF ALL RESPONSES TO PHQ9 QUESTIONS 1 AND 2: 0
SUM OF ALL RESPONSES TO PHQ9 QUESTIONS 1 & 2: 0
1. LITTLE INTEREST OR PLEASURE IN DOING THINGS: NOT AT ALL
2. FEELING DOWN, DEPRESSED OR HOPELESS: NOT AT ALL
2. FEELING DOWN, DEPRESSED OR HOPELESS: NOT AT ALL
SUM OF ALL RESPONSES TO PHQ9 QUESTIONS 1 AND 2: 0
1. LITTLE INTEREST OR PLEASURE IN DOING THINGS: NOT AT ALL
1. LITTLE INTEREST OR PLEASURE IN DOING THINGS: NOT AT ALL
2. FEELING DOWN, DEPRESSED OR HOPELESS: NOT AT ALL

## 2025-01-10 NOTE — ASSESSMENT & PLAN NOTE
Orders:    rosuvastatin (Crestor) 20 mg tablet; Take 1 tablet (20 mg) by mouth once daily.    metoprolol succinate XL (Toprol-XL) 25 mg 24 hr tablet; Take 1 tablet (25 mg) by mouth once daily.    ezetimibe (Zetia) 10 mg tablet; Take 1 tablet (10 mg) by mouth once daily.

## 2025-01-10 NOTE — PROGRESS NOTES
Subjective   Reason for Visit: Jatinder Keenan is an 64 y.o. male here for a Medicare Wellness visit.     Past Medical, Surgical, and Family History reviewed and updated in chart.         HPI  Had a perforated colon diverticulum- colostomy reversed- getting back strength in abdomen  No n/v, fever, chills  No hematochezia, melena  No CP, SOB, palpitations  Some numbness on abdomen still  No weakness, dizziness, HA, vision changes     Ophtho- due to see  Dentist- Does not see  [facility]-  7/10/24  Colonoscopy- 3/28/17- repeat 5 years  XI-  FOBT-  PSA- 7/10/24  UA/Micro-  Lung CT-  Coronary Calcium CT Score-  AAA-  EKG-  Pneumovax- 4/16  RSV-   Prevnar-  Flu-  Shingrix-  Td-  Hep C-  Advance Directives- will work on these  AWV- 1/10/25  MDD screen- 1/10/25  ETOH screen- 1/10/25  PLACIDO-        Patient Care Team:  Raza Sharma DO as PCP - General (Family Medicine)  Pallavi Nunn MD as Surgeon (General Surgery)  Natan Thapa MD as Referring Physician (Cardiology)     Review of Systems   Constitutional:  Positive for fatigue. Negative for chills and fever.   HENT:  Negative for congestion, ear discharge, ear pain, hearing loss, nosebleeds, sore throat, tinnitus and trouble swallowing.    Eyes:  Negative for pain, redness and visual disturbance.   Respiratory:  Negative for cough, chest tightness, shortness of breath and wheezing.    Cardiovascular:  Negative for chest pain, palpitations and leg swelling.   Gastrointestinal:  Positive for abdominal pain. Negative for blood in stool, constipation, diarrhea, nausea and vomiting.   Endocrine: Negative for cold intolerance, heat intolerance, polydipsia, polyphagia and polyuria.   Genitourinary:  Negative for dysuria, frequency, hematuria and urgency.   Musculoskeletal:  Negative for arthralgias, back pain and gait problem.   Skin:  Negative for color change and rash.   Neurological:  Positive for numbness. Negative for dizziness, tremors, syncope, weakness and  "headaches.   Hematological:  Negative for adenopathy. Does not bruise/bleed easily.   Psychiatric/Behavioral:  Negative for dysphoric mood. The patient is not nervous/anxious.        Objective   Vitals:  /78   Pulse 59   Ht 1.702 m (5' 7\")   Wt 82.6 kg (182 lb)   SpO2 98%   BMI 28.51 kg/m²       Physical Exam  Vitals and nursing note reviewed.   Constitutional:       General: He is not in acute distress.     Appearance: Normal appearance. He is not ill-appearing.   HENT:      Head: Normocephalic and atraumatic.      Right Ear: Tympanic membrane, ear canal and external ear normal.      Left Ear: Tympanic membrane, ear canal and external ear normal.      Nose: Nose normal.      Mouth/Throat:      Mouth: Mucous membranes are moist.      Pharynx: Oropharynx is clear.   Eyes:      Extraocular Movements: Extraocular movements intact.      Conjunctiva/sclera: Conjunctivae normal.      Pupils: Pupils are equal, round, and reactive to light.   Cardiovascular:      Rate and Rhythm: Normal rate and regular rhythm.      Heart sounds: Normal heart sounds.   Pulmonary:      Effort: Pulmonary effort is normal.      Breath sounds: Normal breath sounds.   Abdominal:      General: Abdomen is flat. Bowel sounds are normal.      Palpations: Abdomen is soft. There is no mass.   Skin:     Capillary Refill: Capillary refill takes less than 2 seconds.   Neurological:      General: No focal deficit present.      Mental Status: He is alert and oriented to person, place, and time.   Psychiatric:         Mood and Affect: Mood normal.         Behavior: Behavior normal.         Assessment & Plan  Routine general medical examination at health care facility    Orders:    1 Year Follow Up In Primary Care - Wellness Exam; Future    Pseudopolyposis of colon without complication, unspecified part of colon (Multi)         Left hemiparesis (Multi)         Type 2 diabetes mellitus without complication, without long-term current use of insulin " (Multi)    Orders:    POCT glycosylated hemoglobin (Hb A1C) manually resulted    Chronic obstructive pulmonary disease with acute lower respiratory infection (Multi)         Transient ischemic attack (TIA)         Mixed hyperlipidemia    Orders:    rosuvastatin (Crestor) 20 mg tablet; Take 1 tablet (20 mg) by mouth once daily.    metoprolol succinate XL (Toprol-XL) 25 mg 24 hr tablet; Take 1 tablet (25 mg) by mouth once daily.    ezetimibe (Zetia) 10 mg tablet; Take 1 tablet (10 mg) by mouth once daily.    Primary hypertension    Orders:    metoprolol succinate XL (Toprol-XL) 25 mg 24 hr tablet; Take 1 tablet (25 mg) by mouth once daily.    Vitamin D deficiency         Gastroesophageal reflux disease without esophagitis         Diverticulitis of colon with perforation    Orders:    oxyCODONE (Roxicodone) 5 mg immediate release tablet; Take 1 tablet (5 mg) by mouth every 8 hours if needed for moderate pain (4 - 6) for up to 7 days.    Coronary artery disease involving native coronary artery of native heart with unstable angina pectoris    Orders:    metoprolol succinate XL (Toprol-XL) 25 mg 24 hr tablet; Take 1 tablet (25 mg) by mouth once daily.       Renewed/continued rest of medications  Checked  labs  Updated Health Maintenance in HPI section  HTN, controlled- continue meds, low sodium diet     CAD- ASA, zetia, crestor, nitroglycerin prn     GERD- avoid trigger foods, weight control     Hyperlipidemia- continue meds, low fat/cholesterol diet     COPD/Chronic PE- continue inhalers, avoid poor air quality     CKD- avoid NSAID's, 6 glasses clear fluid a day     Left hemiparesis/TIA- resolved     Diverticulitis with rupture- s/p colostomy, follow up with surgeon    Vitamin D def- supplement, follow level

## 2025-01-11 ASSESSMENT — ENCOUNTER SYMPTOMS
WHEEZING: 0
TREMORS: 0
DIZZINESS: 0
NERVOUS/ANXIOUS: 0
PALPITATIONS: 0
POLYPHAGIA: 0
SORE THROAT: 0
SHORTNESS OF BREATH: 0
EYE REDNESS: 0
BRUISES/BLEEDS EASILY: 0
CHILLS: 0
NAUSEA: 0
HEMATURIA: 0
DYSPHORIC MOOD: 0
COLOR CHANGE: 0
EYE PAIN: 0
VOMITING: 0
ADENOPATHY: 0
COUGH: 0
DIARRHEA: 0
DYSURIA: 0
HEADACHES: 0
CHEST TIGHTNESS: 0
FATIGUE: 1
FEVER: 0
CONSTIPATION: 0
FREQUENCY: 0
ARTHRALGIAS: 0
BACK PAIN: 0
POLYDIPSIA: 0
BLOOD IN STOOL: 0
WEAKNESS: 0
NUMBNESS: 1
ABDOMINAL PAIN: 1
TROUBLE SWALLOWING: 0

## 2025-01-12 NOTE — ASSESSMENT & PLAN NOTE
Orders:    oxyCODONE (Roxicodone) 5 mg immediate release tablet; Take 1 tablet (5 mg) by mouth every 8 hours if needed for moderate pain (4 - 6) for up to 7 days.

## 2025-01-12 NOTE — ASSESSMENT & PLAN NOTE
Orders:    metoprolol succinate XL (Toprol-XL) 25 mg 24 hr tablet; Take 1 tablet (25 mg) by mouth once daily.

## 2025-03-15 ENCOUNTER — HOSPITAL ENCOUNTER (EMERGENCY)
Facility: HOSPITAL | Age: 65
Discharge: HOME | End: 2025-03-15
Attending: EMERGENCY MEDICINE
Payer: COMMERCIAL

## 2025-03-15 ENCOUNTER — APPOINTMENT (OUTPATIENT)
Dept: CARDIOLOGY | Facility: HOSPITAL | Age: 65
End: 2025-03-15
Payer: COMMERCIAL

## 2025-03-15 ENCOUNTER — APPOINTMENT (OUTPATIENT)
Dept: RADIOLOGY | Facility: HOSPITAL | Age: 65
End: 2025-03-15
Payer: COMMERCIAL

## 2025-03-15 VITALS
TEMPERATURE: 97.7 F | HEIGHT: 67 IN | HEART RATE: 72 BPM | SYSTOLIC BLOOD PRESSURE: 144 MMHG | OXYGEN SATURATION: 94 % | RESPIRATION RATE: 18 BRPM | DIASTOLIC BLOOD PRESSURE: 111 MMHG | WEIGHT: 175 LBS | BODY MASS INDEX: 27.47 KG/M2

## 2025-03-15 DIAGNOSIS — K64.9 HEMORRHOIDS, UNSPECIFIED HEMORRHOID TYPE: Primary | ICD-10-CM

## 2025-03-15 LAB
ABO GROUP (TYPE) IN BLOOD: NORMAL
ALBUMIN SERPL BCP-MCNC: 4.2 G/DL (ref 3.4–5)
ALP SERPL-CCNC: 42 U/L (ref 33–136)
ALT SERPL W P-5'-P-CCNC: 25 U/L (ref 10–52)
ANION GAP SERPL CALC-SCNC: 11 MMOL/L (ref 10–20)
ANTIBODY SCREEN: NORMAL
AST SERPL W P-5'-P-CCNC: 20 U/L (ref 9–39)
BASOPHILS # BLD AUTO: 0.05 X10*3/UL (ref 0–0.1)
BASOPHILS NFR BLD AUTO: 0.6 %
BILIRUB SERPL-MCNC: 0.6 MG/DL (ref 0–1.2)
BUN SERPL-MCNC: 14 MG/DL (ref 6–23)
CALCIUM SERPL-MCNC: 9.3 MG/DL (ref 8.6–10.3)
CARDIAC TROPONIN I PNL SERPL HS: <3 NG/L (ref 0–20)
CHLORIDE SERPL-SCNC: 106 MMOL/L (ref 98–107)
CO2 SERPL-SCNC: 25 MMOL/L (ref 21–32)
CREAT SERPL-MCNC: 0.78 MG/DL (ref 0.5–1.3)
EGFRCR SERPLBLD CKD-EPI 2021: >90 ML/MIN/1.73M*2
EOSINOPHIL # BLD AUTO: 0.28 X10*3/UL (ref 0–0.7)
EOSINOPHIL NFR BLD AUTO: 3.5 %
ERYTHROCYTE [DISTWIDTH] IN BLOOD BY AUTOMATED COUNT: 11.5 % (ref 11.5–14.5)
GLUCOSE SERPL-MCNC: 93 MG/DL (ref 74–99)
HCT VFR BLD AUTO: 44.4 % (ref 41–52)
HEMOCCULT SP1 STL QL: NEGATIVE
HGB BLD-MCNC: 15.6 G/DL (ref 13.5–17.5)
IMM GRANULOCYTES # BLD AUTO: 0.03 X10*3/UL (ref 0–0.7)
IMM GRANULOCYTES NFR BLD AUTO: 0.4 % (ref 0–0.9)
LACTATE SERPL-SCNC: 1.1 MMOL/L (ref 0.4–2)
LYMPHOCYTES # BLD AUTO: 3.22 X10*3/UL (ref 1.2–4.8)
LYMPHOCYTES NFR BLD AUTO: 40.1 %
MCH RBC QN AUTO: 33.3 PG (ref 26–34)
MCHC RBC AUTO-ENTMCNC: 35.1 G/DL (ref 32–36)
MCV RBC AUTO: 95 FL (ref 80–100)
MONOCYTES # BLD AUTO: 0.66 X10*3/UL (ref 0.1–1)
MONOCYTES NFR BLD AUTO: 8.2 %
NEUTROPHILS # BLD AUTO: 3.78 X10*3/UL (ref 1.2–7.7)
NEUTROPHILS NFR BLD AUTO: 47.2 %
NRBC BLD-RTO: 0 /100 WBCS (ref 0–0)
PLATELET # BLD AUTO: 230 X10*3/UL (ref 150–450)
POTASSIUM SERPL-SCNC: 3.9 MMOL/L (ref 3.5–5.3)
PROT SERPL-MCNC: 6.7 G/DL (ref 6.4–8.2)
RBC # BLD AUTO: 4.68 X10*6/UL (ref 4.5–5.9)
RH FACTOR (ANTIGEN D): NORMAL
SODIUM SERPL-SCNC: 138 MMOL/L (ref 136–145)
WBC # BLD AUTO: 8 X10*3/UL (ref 4.4–11.3)

## 2025-03-15 PROCEDURE — 2500000004 HC RX 250 GENERAL PHARMACY W/ HCPCS (ALT 636 FOR OP/ED): Performed by: NURSE PRACTITIONER

## 2025-03-15 PROCEDURE — 93005 ELECTROCARDIOGRAM TRACING: CPT

## 2025-03-15 PROCEDURE — 2550000001 HC RX 255 CONTRASTS: Performed by: EMERGENCY MEDICINE

## 2025-03-15 PROCEDURE — 85025 COMPLETE CBC W/AUTO DIFF WBC: CPT | Performed by: NURSE PRACTITIONER

## 2025-03-15 PROCEDURE — 83605 ASSAY OF LACTIC ACID: CPT | Performed by: NURSE PRACTITIONER

## 2025-03-15 PROCEDURE — 86850 RBC ANTIBODY SCREEN: CPT | Performed by: NURSE PRACTITIONER

## 2025-03-15 PROCEDURE — 74177 CT ABD & PELVIS W/CONTRAST: CPT

## 2025-03-15 PROCEDURE — 99285 EMERGENCY DEPT VISIT HI MDM: CPT | Mod: 25 | Performed by: EMERGENCY MEDICINE

## 2025-03-15 PROCEDURE — 36415 COLL VENOUS BLD VENIPUNCTURE: CPT | Performed by: NURSE PRACTITIONER

## 2025-03-15 PROCEDURE — 80053 COMPREHEN METABOLIC PANEL: CPT | Performed by: NURSE PRACTITIONER

## 2025-03-15 PROCEDURE — 84484 ASSAY OF TROPONIN QUANT: CPT | Performed by: NURSE PRACTITIONER

## 2025-03-15 PROCEDURE — 74177 CT ABD & PELVIS W/CONTRAST: CPT | Performed by: RADIOLOGY

## 2025-03-15 PROCEDURE — 82270 OCCULT BLOOD FECES: CPT | Performed by: NURSE PRACTITIONER

## 2025-03-15 RX ORDER — PANTOPRAZOLE SODIUM 40 MG/10ML
40 INJECTION, POWDER, LYOPHILIZED, FOR SOLUTION INTRAVENOUS ONCE
Status: COMPLETED | OUTPATIENT
Start: 2025-03-15 | End: 2025-03-15

## 2025-03-15 RX ORDER — HYDROCORTISONE 25 MG/G
1 CREAM TOPICAL 2 TIMES DAILY
Qty: 6 G | Refills: 0 | Status: SHIPPED | OUTPATIENT
Start: 2025-03-15 | End: 2025-04-14

## 2025-03-15 RX ADMIN — IOHEXOL 75 ML: 350 INJECTION, SOLUTION INTRAVENOUS at 10:46

## 2025-03-15 RX ADMIN — PANTOPRAZOLE SODIUM 40 MG: 40 INJECTION, POWDER, FOR SOLUTION INTRAVENOUS at 10:07

## 2025-03-15 ASSESSMENT — LIFESTYLE VARIABLES
TOTAL SCORE: 0
HAVE PEOPLE ANNOYED YOU BY CRITICIZING YOUR DRINKING: NO
EVER FELT BAD OR GUILTY ABOUT YOUR DRINKING: NO
HAVE YOU EVER FELT YOU SHOULD CUT DOWN ON YOUR DRINKING: NO
EVER HAD A DRINK FIRST THING IN THE MORNING TO STEADY YOUR NERVES TO GET RID OF A HANGOVER: NO

## 2025-03-15 ASSESSMENT — PAIN SCALES - GENERAL
PAINLEVEL_OUTOF10: 3
PAINLEVEL_OUTOF10: 0 - NO PAIN

## 2025-03-15 ASSESSMENT — COLUMBIA-SUICIDE SEVERITY RATING SCALE - C-SSRS
6. HAVE YOU EVER DONE ANYTHING, STARTED TO DO ANYTHING, OR PREPARED TO DO ANYTHING TO END YOUR LIFE?: NO
2. HAVE YOU ACTUALLY HAD ANY THOUGHTS OF KILLING YOURSELF?: NO
1. IN THE PAST MONTH, HAVE YOU WISHED YOU WERE DEAD OR WISHED YOU COULD GO TO SLEEP AND NOT WAKE UP?: NO

## 2025-03-15 ASSESSMENT — PAIN - FUNCTIONAL ASSESSMENT: PAIN_FUNCTIONAL_ASSESSMENT: 0-10

## 2025-03-15 ASSESSMENT — PAIN DESCRIPTION - ORIENTATION: ORIENTATION: LEFT;LOWER

## 2025-03-15 ASSESSMENT — PAIN DESCRIPTION - LOCATION: LOCATION: ABDOMEN

## 2025-03-15 NOTE — ED PROVIDER NOTES
"HPI   Chief Complaint   Patient presents with    Black or Bloody Stool     Woke up this AM with bright red blood in stool. No thinners, endorses a \"nagging\" pain in his LLQ. Hx of colostomy reversal       64-year-old male presents today with concern for hematochezia.  He has a history of perforated diverticulitis and he had a sigmoid colectomy in June 2024.  It was reversed by Dr. Nunn on September 2024.  He is endorsing slight left lower quadrant pain.  He denies nausea or vomiting.  He denies fever or chills.  He denies chest pain or dyspnea but yesterday during a krystal day while he was trying to walk he became very weak and he had to lean against his truck.  He denies any recent trauma or fall.  He does have a history of hemorrhoids.      History provided by:  Patient   used: No            Patient History   Past Medical History:   Diagnosis Date    Acute maxillary sinusitis, unspecified 04/03/2015    Acute maxillary sinusitis    Body mass index (BMI) 37.0-37.9, adult 07/17/2019    BMI 37.0-37.9, adult    CAD (coronary artery disease)     Chest pain on breathing 09/10/2015    Chest pain on respiration    Chronic pulmonary embolism without acute cor pulmonale, unspecified pulmonary embolism type (Multi) 07/10/2024    Colostomy status     Contact dermatitis due to poison ivy 06/22/2024    COPD (chronic obstructive pulmonary disease)     COVID-19 virus infection 03/16/2023    GERD (gastroesophageal reflux disease)     History of gastrointestinal hemorrhage 02/12/2018    History of pulmonary embolism 06/20/2022    Hyperlipidemia     Hypertension     Laceration of left elbow 03/16/2023    Left hemiparesis     Lumbago with sciatica, left side 07/19/2017    Acute left-sided low back pain with left-sided sciatica    Muscle pain 03/16/2023    Myocardial infarction (Multi)     Other specified abnormal findings of blood chemistry     Abnormal liver function test    Perforated diverticulum     Personal " history of colonic polyps     Personal history of diseases of the skin and subcutaneous tissue 01/30/2014    History of folliculitis    Personal history of other diseases of the digestive system 03/29/2017    History of lower gastrointestinal bleeding    Personal history of other specified conditions 01/30/2014    History of headache    Personal history of other specified conditions 11/15/2018    History of epigastric pain    Polymyalgia rheumatica (Multi) 11/15/2018    Polymyalgia    TIA (transient ischemic attack)     Weakness 05/07/2018    Left-sided weakness    Wears glasses     Wears partial dentures     upper, lower bridge     Past Surgical History:   Procedure Laterality Date    COLONOSCOPY  03/29/2017    Complete Colonoscopy    COLOSTOMY Bilateral 06/26/2024    CORONARY ANGIOPLASTY WITH STENT PLACEMENT      Cath Stent Placement, x2    ESOPHAGOGASTRODUODENOSCOPY  03/29/2017    Diagnostic Esophagogastroduodenoscopy    LUMBAR DISCECTOMY      MR HEAD ANGIO WO IV CONTRAST  04/30/2018    MR HEAD ANGIO WO IV CONTRAST 4/30/2018 [facility]    MR NECK ANGIO WO IV CONTRAST  04/30/2018    MR NECK ANGIO WO IV CONTRAST 4/30/2018 [facility]    SIGMOIDECTOMY  06/26/2024     Family History   Problem Relation Name Age of Onset    Diabetes Mother      Heart failure Father       Social History     Tobacco Use    Smoking status: Some Days     Current packs/day: 0.25     Average packs/day: 0.3 packs/day for 15.0 years (3.8 ttl pk-yrs)     Types: Cigarettes    Smokeless tobacco: Never   Vaping Use    Vaping status: Never Used   Substance Use Topics    Alcohol use: Yes     Comment: socially    Drug use: Yes     Types: Marijuana       Physical Exam   ED Triage Vitals [03/15/25 0938]   Temperature Heart Rate Respirations BP   36.5 °C (97.7 °F) 72 18 (!) 144/111      Pulse Ox Temp Source Heart Rate Source Patient Position   95 % Skin Monitor Lying      BP Location FiO2 (%)     Right arm --       Physical  Exam  Constitutional:       Appearance: Normal appearance.   HENT:      Head: Normocephalic and atraumatic.      Right Ear: Tympanic membrane normal.      Left Ear: Tympanic membrane normal.      Nose: Nose normal.   Eyes:      Extraocular Movements: Extraocular movements intact.      Pupils: Pupils are equal, round, and reactive to light.   Cardiovascular:      Rate and Rhythm: Normal rate and regular rhythm.      Pulses: Normal pulses.      Heart sounds: Normal heart sounds.   Pulmonary:      Effort: Pulmonary effort is normal.      Breath sounds: Normal breath sounds.   Abdominal:      General: Abdomen is flat.      Palpations: Abdomen is soft.      Tenderness: There is abdominal tenderness.   Genitourinary:     Rectum: Guaiac result negative.      Comments: Large hemorrhoids during exam  Musculoskeletal:         General: Normal range of motion.   Skin:     General: Skin is warm.      Capillary Refill: Capillary refill takes less than 2 seconds.   Neurological:      General: No focal deficit present.      Mental Status: He is alert and oriented to person, place, and time.   Psychiatric:         Mood and Affect: Mood normal.         Behavior: Behavior normal.           ED Course & MDM   Diagnoses as of 03/15/25 1343   Hemorrhoids, unspecified hemorrhoid type                 No data recorded     Celine Coma Scale Score: 15 (03/15/25 0938 : Bjorn Gudino, RN)                           Medical Decision Making  Rectal exam had large hemorrhoids.  His conjunctiva was pale and he was typed and screened.  With his history of a perforated diverticulitis which required a sigmoid colectomy I ordered a CT with contrast.  His abdomen was tender in the left lower quadrant.  He received Protonix.  Vital signs rechecked and stable.  Patient was typed and screened.  His cardiac enzyme was undetectable.  His lactate was normal.  His metabolic panel was normal.  His CBC was negative for acute anemia.  CT abdomen pelvis showed no  acute process.  No evidence of an active GI bleed.  The blood in the toilet is probably due to the hemorrhoids I appreciated during rectal exam.  I requested appointment with GI for outpatient follow-up and he was placed on Anusol.  Other findings on CT are chronic.  With normal vital signs he was safely discharged home with return precautions.    Amount and/or Complexity of Data Reviewed  Labs: ordered.  Radiology: ordered and independent interpretation performed.  ECG/medicine tests: ordered and independent interpretation performed.     Details: EKG is 70 bpm OH interval normal QT corrected normal.  Right bundle branch block.  Unchanged from prior EKG.  Left axis.  No ST elevation or depression.  Interpreted both by attending and myself.        Procedure  Procedures     Raza Lemus, МАРИНА-CNP  03/15/25 [id number]

## 2025-03-15 NOTE — ED TRIAGE NOTES
"Patient here for bloody stool Woke up this AM with bright red blood in stool. No thinners, endorses a \"nagging\" pain in his LLQ. Hx of colostomy reversal. Does endorses hx of hemorrhoids.   "

## 2025-03-15 NOTE — ED PROVIDER NOTES
The patient was seen by the midlevel/resident.  I have personally saw the patient and made/approved the management plan and take responsibility for the patient management.  I reviewed the EKG's (when done) and agree with the interpretation.  I have seen and examined the patient; agree with the workup, evaluation, MDM, and diagnosis.  The care plan has been discussed with the midlevel/resident; I have reviewed the note and agree with the documented findings.     Patient presents with concerns of blood in his stool.  He did have a previous perforation requiring surgery of his intestines by Dr. Nunn about a year ago.  On my exam he appears comfortable.  Plan is to workup with CT scan and labs.  He did have some blood on his rectal per the midlevel who performed it. No indication for hospitalization. Stable for discharge. Gave return indications.   Diagnoses as of 03/15/25 1440   Hemorrhoids, unspecified hemorrhoid type     MD Ricardo Cabrera MD  03/15/25 1440     not applicable with patient

## 2025-03-19 LAB
ATRIAL RATE: 70 BPM
P AXIS: 32 DEGREES
P OFFSET: 175 MS
P ONSET: 131 MS
PR INTERVAL: 144 MS
Q ONSET: 203 MS
QRS COUNT: 12 BEATS
QRS DURATION: 128 MS
QT INTERVAL: 422 MS
QTC CALCULATION(BAZETT): 455 MS
QTC FREDERICIA: 444 MS
R AXIS: -48 DEGREES
T AXIS: 46 DEGREES
T OFFSET: 414 MS
VENTRICULAR RATE: 70 BPM

## 2025-04-03 ENCOUNTER — APPOINTMENT (OUTPATIENT)
Dept: PRIMARY CARE | Facility: CLINIC | Age: 65
End: 2025-04-03
Payer: COMMERCIAL

## 2025-04-03 VITALS
DIASTOLIC BLOOD PRESSURE: 76 MMHG | HEIGHT: 67 IN | BODY MASS INDEX: 28.25 KG/M2 | OXYGEN SATURATION: 96 % | WEIGHT: 180 LBS | HEART RATE: 72 BPM | SYSTOLIC BLOOD PRESSURE: 132 MMHG

## 2025-04-03 DIAGNOSIS — J44.0 CHRONIC OBSTRUCTIVE PULMONARY DISEASE WITH ACUTE LOWER RESPIRATORY INFECTION: ICD-10-CM

## 2025-04-03 DIAGNOSIS — R10.32 LLQ ABDOMINAL PAIN: ICD-10-CM

## 2025-04-03 DIAGNOSIS — I10 PRIMARY HYPERTENSION: Primary | ICD-10-CM

## 2025-04-03 PROCEDURE — G2211 COMPLEX E/M VISIT ADD ON: HCPCS | Performed by: FAMILY MEDICINE

## 2025-04-03 PROCEDURE — 1159F MED LIST DOCD IN RCRD: CPT | Performed by: FAMILY MEDICINE

## 2025-04-03 PROCEDURE — 3008F BODY MASS INDEX DOCD: CPT | Performed by: FAMILY MEDICINE

## 2025-04-03 PROCEDURE — 3078F DIAST BP <80 MM HG: CPT | Performed by: FAMILY MEDICINE

## 2025-04-03 PROCEDURE — 1123F ACP DISCUSS/DSCN MKR DOCD: CPT | Performed by: FAMILY MEDICINE

## 2025-04-03 PROCEDURE — 99214 OFFICE O/P EST MOD 30 MIN: CPT | Performed by: FAMILY MEDICINE

## 2025-04-03 PROCEDURE — 3075F SYST BP GE 130 - 139MM HG: CPT | Performed by: FAMILY MEDICINE

## 2025-04-03 PROCEDURE — 1160F RVW MEDS BY RX/DR IN RCRD: CPT | Performed by: FAMILY MEDICINE

## 2025-04-03 RX ORDER — GABAPENTIN 300 MG/1
300 CAPSULE ORAL 3 TIMES DAILY
Qty: 90 CAPSULE | Refills: 11 | Status: SHIPPED | OUTPATIENT
Start: 2025-04-03 | End: 2026-04-03

## 2025-04-03 NOTE — PROGRESS NOTES
Subjective   Patient ID: Jatinder Keenan is a 64 y.o. male who presents for Follow-up.  HPI  Had an episode where ate something wrong and got bound up- pushed hard and then had blood in stool from a hemorrhoid  Has pain in LLQ when there is a lot of pressure on the area- since had the colostomy surgeries- nagging pain    No CP, SOB, palpitations  Some numbness on abdomen still  No weakness, dizziness, HA, vision changes    Ophtho- due to see  Dentist- Does not see  [facility]-  7/10/24  Colonoscopy- 3/28/17- repeat 5 years  XI-  FOBT-  PSA- 7/10/24  UA/Micro-  Lung CT-  Coronary Calcium CT Score-  AAA-  EKG-  Pneumovax- 4/16  RSV-   Prevnar-  Flu-  Shingrix-  Td-  Hep C-  Advance Directives- will work on these  AWV- 1/10/25  MDD screen- 1/10/25  ETOH screen- 1/10/25    Current Outpatient Medications:     acetaminophen (Tylenol) 325 mg tablet, Take 2 tablets (650 mg) by mouth every 6 hours if needed for mild pain (1 - 3)., Disp: 30 tablet, Rfl: 0    aspirin 81 mg EC tablet, Take 1 tablet (81 mg) by mouth once daily., Disp: , Rfl:     docusate sodium (Colace) 100 mg capsule, Take 2 capsules (200 mg) by mouth once every 24 hours., Disp: 30 capsule, Rfl: 0    ezetimibe (Zetia) 10 mg tablet, Take 1 tablet (10 mg) by mouth once daily., Disp: 90 tablet, Rfl: 3    hydrocortisone (Anusol-HC) 2.5 % rectal cream, Insert 1 Application into the rectum 2 times a day. Apply rectally 2 times daily, Disp: 6 g, Rfl: 0    metoprolol succinate XL (Toprol-XL) 25 mg 24 hr tablet, Take 1 tablet (25 mg) by mouth once daily., Disp: 90 tablet, Rfl: 3    nitroglycerin (Nitrostat) 0.4 mg SL tablet, Place 1 tablet (0.4 mg) under the tongue every 5 minutes if needed., Disp: , Rfl:     polyethylene glycol (Glycolax, Miralax) 17 gram/dose powder, Take 17 g by mouth once daily. Do not fill before July 2, 2024., Disp: 238 g, Rfl: 0    rosuvastatin (Crestor) 20 mg tablet, Take 1 tablet (20 mg) by mouth once daily., Disp: 90 tablet, Rfl: 3     gabapentin (Neurontin) 300 mg capsule, Take 1 capsule (300 mg) by mouth 3 times a day., Disp: 90 capsule, Rfl: 11   Past Surgical History:   Procedure Laterality Date    COLONOSCOPY  03/29/2017    Complete Colonoscopy    COLOSTOMY Bilateral 06/26/2024    CORONARY ANGIOPLASTY WITH STENT PLACEMENT      Cath Stent Placement, x2    ESOPHAGOGASTRODUODENOSCOPY  03/29/2017    Diagnostic Esophagogastroduodenoscopy    LUMBAR DISCECTOMY      MR HEAD ANGIO WO IV CONTRAST  04/30/2018    MR HEAD ANGIO WO IV CONTRAST 4/30/2018 [facility]    MR NECK ANGIO WO IV CONTRAST  04/30/2018    MR NECK ANGIO WO IV CONTRAST 4/30/2018 [facility]    SIGMOIDECTOMY  06/26/2024      Past Medical History:   Diagnosis Date    Acute maxillary sinusitis, unspecified 04/03/2015    Acute maxillary sinusitis    Body mass index (BMI) 37.0-37.9, adult 07/17/2019    BMI 37.0-37.9, adult    CAD (coronary artery disease)     Chest pain on breathing 09/10/2015    Chest pain on respiration    Chronic pulmonary embolism without acute cor pulmonale, unspecified pulmonary embolism type (Multi) 07/10/2024    Colostomy status     Contact dermatitis due to poison ivy 06/22/2024    COPD (chronic obstructive pulmonary disease)     COVID-19 virus infection 03/16/2023    GERD (gastroesophageal reflux disease)     History of gastrointestinal hemorrhage 02/12/2018    History of pulmonary embolism 06/20/2022    Hyperlipidemia     Hypertension     Laceration of left elbow 03/16/2023    Left hemiparesis     Lumbago with sciatica, left side 07/19/2017    Acute left-sided low back pain with left-sided sciatica    Muscle pain 03/16/2023    Myocardial infarction (Multi)     Other specified abnormal findings of blood chemistry     Abnormal liver function test    Perforated diverticulum     Personal history of colonic polyps     Personal history of diseases of the skin and subcutaneous tissue 01/30/2014    History of folliculitis    Personal history of other  "diseases of the digestive system 03/29/2017    History of lower gastrointestinal bleeding    Personal history of other specified conditions 01/30/2014    History of headache    Personal history of other specified conditions 11/15/2018    History of epigastric pain    Polymyalgia rheumatica (Multi) 11/15/2018    Polymyalgia    TIA (transient ischemic attack)     Weakness 05/07/2018    Left-sided weakness    Wears glasses     Wears partial dentures     upper, lower bridge     Social History     Tobacco Use    Smoking status: Some Days     Current packs/day: 0.25     Average packs/day: 0.3 packs/day for 15.0 years (3.8 ttl pk-yrs)     Types: Cigarettes    Smokeless tobacco: Never   Vaping Use    Vaping status: Never Used   Substance Use Topics    Alcohol use: Yes     Comment: socially    Drug use: Yes     Types: Marijuana      Family History   Problem Relation Name Age of Onset    Diabetes Mother      Heart failure Father        Review of Systems    Objective   /76   Pulse 72   Ht 1.702 m (5' 7\")   Wt 81.6 kg (180 lb)   SpO2 96%   BMI 28.19 kg/m²    Physical Exam  Vitals and nursing note reviewed.   Constitutional:       General: He is not in acute distress.     Appearance: Normal appearance. He is not ill-appearing.   HENT:      Head: Normocephalic and atraumatic.      Right Ear: Tympanic membrane, ear canal and external ear normal.      Left Ear: Tympanic membrane, ear canal and external ear normal.      Nose: Nose normal.      Mouth/Throat:      Mouth: Mucous membranes are moist.      Pharynx: Oropharynx is clear.   Eyes:      Extraocular Movements: Extraocular movements intact.      Conjunctiva/sclera: Conjunctivae normal.      Pupils: Pupils are equal, round, and reactive to light.   Cardiovascular:      Rate and Rhythm: Normal rate and regular rhythm.      Heart sounds: Normal heart sounds.   Pulmonary:      Effort: Pulmonary effort is normal.      Breath sounds: Normal breath sounds.   Abdominal:      " General: Abdomen is flat. Bowel sounds are normal.      Palpations: Abdomen is soft. There is no mass.      Comments: Some TTP around where surgical scar is located   Skin:     Capillary Refill: Capillary refill takes less than 2 seconds.   Neurological:      General: No focal deficit present.      Mental Status: He is alert and oriented to person, place, and time.   Psychiatric:         Mood and Affect: Mood normal.         Behavior: Behavior normal.         Assessment/Plan   Problem List Items Addressed This Visit       Hypertension - Primary    Chronic obstructive pulmonary disease (Multi)     Other Visit Diagnoses       LLQ abdominal pain        Relevant Medications    gabapentin (Neurontin) 300 mg capsule        LLQ abdominal pian- gabapentin, heat    COPD/Chronic PE- continue inhalers, avoid poor air quality     HTN, controlled- continue meds, low sodium diet     Patient understands and agrees with treatment plan    Raza Sharma, DO

## 2025-10-06 ENCOUNTER — APPOINTMENT (OUTPATIENT)
Dept: PRIMARY CARE | Facility: CLINIC | Age: 65
End: 2025-10-06
Payer: COMMERCIAL

## (undated) DEVICE — TIP,  ELECTRODE COATED INSULATED, EXTENDED, LF

## (undated) DEVICE — SUTURE, PDSII, 1, TP-1, VIL, MONO, 48LP

## (undated) DEVICE — SUTURE, VICRYL, 2-0, 27 IN, CT-1, VIOLET

## (undated) DEVICE — TIP, SUCTION, YANKAUER, BULB, ADULT

## (undated) DEVICE — SUTURE, CTD, VICRYL, 2-0, UND, BR, CT-2

## (undated) DEVICE — POUCH KIT, NEW IMAGE, DRAINABLE, 2-1/4 IN

## (undated) DEVICE — GOWN, ASTOUND, L

## (undated) DEVICE — PREP TRAY, SKIN, DRY, W/GLOVES

## (undated) DEVICE — SUTURE, PDS II, 0, 60 IN, CTX, VIOLET

## (undated) DEVICE — SOLUTION, IRRIGATION, SODIUM CHLORIDE 0.9%, 1000 ML, POUR BOTTLE

## (undated) DEVICE — SUTURE, SILK, 2-0, TIES, 12-30 IN, BLACK

## (undated) DEVICE — SUTURE, VICRYL, 2-0, 54 IN, TIE, VIOLET

## (undated) DEVICE — COVER, TABLE, 44X90

## (undated) DEVICE — DRAIN, CHANNEL, HUBLESS, 1/4 ROUND FULL FLUTE, 19 FR/W 1/4" TROCAR"

## (undated) DEVICE — TOWEL PACK, STERILE, 4/PACK, BLUE

## (undated) DEVICE — SYRINGE, LUER LOCK, 12ML

## (undated) DEVICE — CUTTER, PROXIMATE LINEAR RELOAD, 75MM, BLUE

## (undated) DEVICE — ELECTRODE, ELECTROSURGICAL, BLADE, INSULATED, ENT/IMA, STERILE

## (undated) DEVICE — DRAPE, LEGGINGS, 48 X 31 IN, STERILE, LF

## (undated) DEVICE — SUTURE, VICRYL, 3-0, 27 IN, CT-2, UNDYED

## (undated) DEVICE — SUTURE, VICRYL, 4-0, 18 IN, PS2, UNDYED

## (undated) DEVICE — Device

## (undated) DEVICE — SUTURE, VICRYL PLUS 3-0, SH, 27IN

## (undated) DEVICE — SUTURE, SILK, 3-0, 18 IN, SH1, DETACHABLE, MULTIPACK, BLACK

## (undated) DEVICE — STAPLER, ECHELON CIRCULAR POWERED, 25MM, DISP

## (undated) DEVICE — SUTURE, PROLENE, 2-0, 30 IN, CT2, BLUE

## (undated) DEVICE — DRESSING, NON-ADHERENT, CURAD, ABSORBENT, 3 X 8 IN, STERILE

## (undated) DEVICE — STAPLER, LINEAR, 3.5 60MM, RELOADABLE, BLUE

## (undated) DEVICE — SUTURE, VICRYL, 3-0, 18 IN, SH, VIOLET

## (undated) DEVICE — TRAY, FOLEY, URINE METER, SURESTEP, 16FR, W/STATLOCK

## (undated) DEVICE — CATHETER, URETERAL, WHISTLE TIP, 5 FR, 115 CM, POLYURETHANE

## (undated) DEVICE — SUTURE, PDS II, 0, 27 IN, CT-2, VIOLET

## (undated) DEVICE — SUTURE, VICRYL, 3-0, 27 IN, SH-1, VIOLET

## (undated) DEVICE — IRRIGATION SET, CYSTOSCOPY, TURP, Y, CONTINUOUS, 81 IN

## (undated) DEVICE — COVER, MAYO STAND, W/PAD, 23 IN, DISPOSABLE, PLASTIC, LF, STERILE

## (undated) DEVICE — RESERVOIR, DRAINAGE, WOUND, JACKSON-PRATT, 100 CC, SILICONE

## (undated) DEVICE — CONTAINER, SPECIMEN, 120ML

## (undated) DEVICE — TOWEL, OR, XRAY DETECT 5 PK, WHITE, 17X26, W/DMT TAG, ST

## (undated) DEVICE — DRAPE PACK, LAPAROSCOPIC CHOLECYSTECTOMY, CUSTOM, GEAUGA

## (undated) DEVICE — SOLUTION, IRRIGATION, USP, SODIUM CHLORIDE 0.9%, 3000 ML, BAG

## (undated) DEVICE — SUTURE, SILK, 3-0, 30 IN, BR SH, BLACK

## (undated) DEVICE — STOPCOCK, SAN ANTONIO, W/MODIFIED FITTING

## (undated) DEVICE — SUTURE, CTD, VICRYL 3-0, UND, BR, SH-1

## (undated) DEVICE — HANDPIECE, POOLE SUCTION, W/O TUBING

## (undated) DEVICE — DRAPE PACK, MAJOR, CUSTOM, GEAUGA

## (undated) DEVICE — DRESSING, ISLAND, ADHESIVE, TELFA, 4 X 8 IN

## (undated) DEVICE — LIGASURE IMPACT, 18CM

## (undated) DEVICE — ADHESIVE, SKIN, DERMABOND ADVANCED, 15CM, PEN-STYLE

## (undated) DEVICE — TUBING, SUCTION, CONNECTING, NON-CONDUCTIVE, SURE GRIP CONNECTORS, 3/16 IN X 10 FT

## (undated) DEVICE — GLOVE, SURGICAL, PROTEXIS PI MICRO, 6.0, PF, LF

## (undated) DEVICE — TRAY, MINOR, SINGLE BASIN, STERILE

## (undated) DEVICE — GLOVE, SURGICAL, PROTEXIS,  7.5, PF, LATEX

## (undated) DEVICE — CAUTERY, PENCIL, PUSH BUTTON, SMOKE EVAC, 70MM

## (undated) DEVICE — STAPLER, SKIN, PLUS, WIDE, 35

## (undated) DEVICE — SPONGE, LAP, XRAY DECT, 4IN X 18IN, W/MASTER DMT, STERILE

## (undated) DEVICE — CUTTER, PROX LINEAR, 75MM, REG TISSUE, W/ SAFETY LOCK OUT

## (undated) DEVICE — SUTURE, SILK, 2-0, 30 IN, SH, BLACK

## (undated) DEVICE — COLLECTION BAG, FLUID, NON-STERILE

## (undated) DEVICE — SUTURE, SILK, 0, 30 IN, BR, BLACK

## (undated) DEVICE — DRAIN, PENROSE, 0.25 X 12 IN, LF

## (undated) DEVICE — BAG, PRESSURE INFUSER, 3000 CC, LF

## (undated) DEVICE — SUTURE, VICRYL, 3-0, 54 IN, TIE, VIOLET